# Patient Record
Sex: FEMALE | Race: BLACK OR AFRICAN AMERICAN | NOT HISPANIC OR LATINO | Employment: OTHER | URBAN - METROPOLITAN AREA
[De-identification: names, ages, dates, MRNs, and addresses within clinical notes are randomized per-mention and may not be internally consistent; named-entity substitution may affect disease eponyms.]

---

## 2017-01-26 ENCOUNTER — OFFICE VISIT (OUTPATIENT)
Dept: FAMILY MEDICINE | Facility: CLINIC | Age: 67
End: 2017-01-26
Payer: MEDICARE

## 2017-01-26 VITALS
HEART RATE: 70 BPM | TEMPERATURE: 97 F | WEIGHT: 165.56 LBS | BODY MASS INDEX: 27.58 KG/M2 | HEIGHT: 65 IN | DIASTOLIC BLOOD PRESSURE: 82 MMHG | RESPIRATION RATE: 18 BRPM | SYSTOLIC BLOOD PRESSURE: 136 MMHG

## 2017-01-26 DIAGNOSIS — M54.32 SCIATICA OF LEFT SIDE: Primary | ICD-10-CM

## 2017-01-26 PROCEDURE — 99999 PR PBB SHADOW E&M-EST. PATIENT-LVL III: CPT | Mod: PBBFAC,,, | Performed by: FAMILY MEDICINE

## 2017-01-26 PROCEDURE — 99213 OFFICE O/P EST LOW 20 MIN: CPT | Mod: PBBFAC,PO | Performed by: FAMILY MEDICINE

## 2017-01-26 PROCEDURE — 99213 OFFICE O/P EST LOW 20 MIN: CPT | Mod: S$PBB,,, | Performed by: FAMILY MEDICINE

## 2017-01-26 RX ORDER — HYDROCODONE BITARTRATE AND ACETAMINOPHEN 5; 325 MG/1; MG/1
1-2 TABLET ORAL EVERY 6 HOURS PRN
Qty: 30 TABLET | Refills: 0 | Status: SHIPPED | OUTPATIENT
Start: 2017-01-26 | End: 2017-02-05

## 2017-01-26 RX ORDER — CYCLOBENZAPRINE HCL 5 MG
5 TABLET ORAL 3 TIMES DAILY PRN
Qty: 30 TABLET | Refills: 1 | Status: SHIPPED | OUTPATIENT
Start: 2017-01-26 | End: 2017-02-05

## 2017-01-26 NOTE — PROGRESS NOTES
CHIEF COMPLAINT: This is a 66-year-old female complaining of lower back and leg pain.    SUBJECTIVE: The patient complains of a one-week history of left lower back pain which radiates around to anterior thigh and extends to ankle.  She denies any history of injury or increased activity.  Pain is described as a dull achiness which is intermittent in nature.  She rates the pain at 8 out of 10 on the pain scale.  Patient has been using heating pad with some easing of the pain.  She also tried taking Gas-X without relief.  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  She has autoimmune hepatitis and takes small dose of prednisone daily.  She denies numbness, tingling or weakness in limb.    ROS:  GENERAL: Patient denies fever, chills, night sweats.  Patient denies weight gain or loss. Patient denies anorexia, fatigue, weakness or swollen glands.  SKIN: Patient denies rash.  LUNGS: Patient denies cough, wheeze or hemoptysis.  CARDIOVASCULAR: Patient denies chest pain, shortness of breath, palpitations, syncope or lower extremity edema.  GI: Patient denies abdominal pain, nausea, vomiting, diarrhea, constipation, blood in stool or melena.  GENITOURINARY: Patient denies pelvic pain, vaginal discharge, itch or odor. Patient denies irregular vaginal bleeding.  Patient denies dysuria, frequency, hematuria, nocturia, urgency or incontinence.  MUSCULOSKELETAL: Patient denies joint swelling, redness or warmth.  NEUROLOGIC: Patient denies headache, vertigo, numbness, tingling, weakness in limb, or abnormality of gait.    OBJECTIVE:   GENERAL: Well-developed well-nourished, black female alert and oriented x3 in no acute distress.  Memory, judgment and cognition without deficit.  Accompanied by her daughter.  SKIN: Clear without rash.  Normal color and tone.  HEENT: Eyes: Clear conjunctivae.  No scleral icterus.    NECK: Supple, normal range of motion.   LUNGS: Normal respiratory effort.  BACK: No CVA or  spinal tenderness.  Slight tenderness in left upper buttock area.  EXTREMITIES: Without cyanosis, clubbing or edema.  Distal pulses 2+ and equal.  Normal range of motion in lower extremities.  No joint effusion, erythema or warmth.  NEUROLOGIC:    Motor strength equal bilaterally.  Sensation normal to touch.  Deep tendon reflexes 2+ and equal.  Gait without abnormality.  No tremor.  Negative SLR.  Negative Sowmya.    ASSESSMENT:  Left sided sciatica    PLAN:   1.  Apply moist heat and/or ice 4 times a day for 15-20 minutes.  2.  Cyclobenzaprine 5 mg 3 times a day as needed for muscle spasm.  3.  Norco 5-3 and 25 mg 1-2 tablets every 6 hours as needed for pain.  4.  Consider physical therapy if no improvement.

## 2017-01-26 NOTE — MR AVS SNAPSHOT
[facility]  [address]  [place] [address]  Phone: [phone]                  Julianna Montemayor   2017 10:45 AM   Office Visit    Description:  Female : 1950   Provider:  Juhi Wilburn MD   Department:  [facility]           Reason for Visit     Hip Pain           Diagnoses this Visit        Comments    Sciatica of left side    -  Primary            To Do List           Goals (5 Years of Data)     None       These Medications        Disp Refills Start End    cyclobenzaprine (FLEXERIL) 5 MG tablet 30 tablet 1 2017    Take 1 tablet (5 mg total) by mouth 3 (three) times daily as needed for Muscle spasms. - Oral    Pharmacy: [facility]/pharmacy #[id number]  [place], [place] - [address] AT AT [facility] Ph #: [phone]       hydrocodone-acetaminophen 5-325mg (NORCO) 5-325 mg per tablet 30 tablet 0 2017    Take 1-2 tablets by mouth every 6 (six) hours as needed for Pain. - Oral    Pharmacy: [facility]/pharmacy #[id number] - [place] - [address] AT AT [facility] Ph #: [phone]         [facility] On Call     [facility] On Call Nurse Care Line -  Assistance  Registered nurses in the [facility] provide clinical advisement, health education, appointment booking, and other advisory services.  Call for this free service at [phone].             Medications           Message regarding Medications     Verify the changes and/or additions to your medication regime listed below are the same as discussed with your clinician today.  If any of these changes or additions are incorrect, please notify your healthcare provider.        START taking these NEW medications        Refills    cyclobenzaprine (FLEXERIL) 5 MG tablet 1    Sig: Take 1 tablet (5 mg total) by mouth 3 (three) times daily as needed for Muscle spasms.    Class: Normal    Route: Oral    hydrocodone-acetaminophen 5-325mg  "(NORCO) 5-325 mg per tablet 0    Sig: Take 1-2 tablets by mouth every 6 (six) hours as needed for Pain.    Class: Print    Route: Oral           Verify that the below list of medications is an accurate representation of the medications you are currently taking.  If none reported, the list may be blank. If incorrect, please contact your healthcare provider. Carry this list with you in case of emergency.           Current Medications     azathioprine (IMURAN) 50 mg Tab TAKE 1 TABLET (50 MG TOTAL) BY MOUTH ONCE DAILY.    calcium-vitamin D (OSCAL) 250 (625)-125 mg-unit per tablet Take 1 tablet by mouth once daily.    cholecalciferol, vitamin D3, (VITAMIN D3) 5,000 unit Tab Take 1,000 Units by mouth once daily.     fluticasone (FLONASE) 50 mcg/actuation nasal spray 2 sprays by Each Nare route once daily.    ketoconazole (NIZORAL) 2 % cream Apply to bottom of foot twice daily for two weeks    lisinopril-hydrochlorothiazide (PRINZIDE,ZESTORETIC) 10-12.5 mg per tablet Take 1 tablet by mouth once daily.    predniSONE (DELTASONE) 5 MG tablet TAKE 1/2 TABLET BY MOUTH DAILY    cyclobenzaprine (FLEXERIL) 5 MG tablet Take 1 tablet (5 mg total) by mouth 3 (three) times daily as needed for Muscle spasms.    hydrocodone-acetaminophen 5-325mg (NORCO) 5-325 mg per tablet Take 1-2 tablets by mouth every 6 (six) hours as needed for Pain.    triamcinolone acetonide 0.1% (KENALOG) 0.1 % cream Apply topically 3 (three) times daily.           Clinical Reference Information           Vital Signs - Last Recorded  Most recent update: 1/26/2017 10:47 AM by Veena Dunaway LPN    BP Pulse Temp Resp Ht Wt    136/82 70 97.3 °F (36.3 °C) (Tympanic) 18 5' 5" (1.651 m) 75.1 kg (165 lb 9.1 oz)    BMI                27.55 kg/m2          Blood Pressure          Most Recent Value    BP  136/82      Allergies as of 1/26/2017     No Known Drug Allergies      Immunizations Administered on Date of Encounter - 1/26/2017     None      "

## 2017-02-01 RX ORDER — LISINOPRIL AND HYDROCHLOROTHIAZIDE 10; 12.5 MG/1; MG/1
TABLET ORAL
Qty: 30 TABLET | Refills: 0 | Status: SHIPPED | OUTPATIENT
Start: 2017-02-01 | End: 2017-03-11 | Stop reason: SDUPTHER

## 2017-02-10 ENCOUNTER — HOSPITAL ENCOUNTER (OUTPATIENT)
Dept: RADIOLOGY | Facility: HOSPITAL | Age: 67
Discharge: HOME | End: 2017-02-10
Attending: FAMILY MEDICINE
Payer: MEDICARE

## 2017-02-10 ENCOUNTER — OFFICE VISIT (OUTPATIENT)
Dept: FAMILY MEDICINE | Facility: CLINIC | Age: 67
End: 2017-02-10
Payer: MEDICARE

## 2017-02-10 VITALS
SYSTOLIC BLOOD PRESSURE: 120 MMHG | HEART RATE: 61 BPM | BODY MASS INDEX: 26.75 KG/M2 | DIASTOLIC BLOOD PRESSURE: 90 MMHG | WEIGHT: 166.44 LBS | RESPIRATION RATE: 16 BRPM | TEMPERATURE: 97 F | OXYGEN SATURATION: 98 % | HEIGHT: 66 IN

## 2017-02-10 DIAGNOSIS — M54.32 SCIATICA OF LEFT SIDE: ICD-10-CM

## 2017-02-10 DIAGNOSIS — M54.32 SCIATICA OF LEFT SIDE: Primary | ICD-10-CM

## 2017-02-10 PROCEDURE — 73502 X-RAY EXAM HIP UNI 2-3 VIEWS: CPT | Mod: 26,LT,, | Performed by: RADIOLOGY

## 2017-02-10 PROCEDURE — 72100 X-RAY EXAM L-S SPINE 2/3 VWS: CPT | Mod: TC,PO

## 2017-02-10 PROCEDURE — 73502 X-RAY EXAM HIP UNI 2-3 VIEWS: CPT | Mod: TC,PO,LT

## 2017-02-10 PROCEDURE — 72100 X-RAY EXAM L-S SPINE 2/3 VWS: CPT | Mod: 26,,, | Performed by: RADIOLOGY

## 2017-02-10 PROCEDURE — 99214 OFFICE O/P EST MOD 30 MIN: CPT | Mod: S$PBB,,, | Performed by: FAMILY MEDICINE

## 2017-02-10 PROCEDURE — 99999 PR PBB SHADOW E&M-EST. PATIENT-LVL III: CPT | Mod: PBBFAC,,, | Performed by: FAMILY MEDICINE

## 2017-02-10 RX ORDER — TRAMADOL HYDROCHLORIDE 50 MG/1
50 TABLET ORAL EVERY 12 HOURS PRN
Qty: 30 TABLET | Refills: 0 | Status: SHIPPED | OUTPATIENT
Start: 2017-02-10 | End: 2017-02-20

## 2017-02-10 NOTE — PROGRESS NOTES
Subjective:       Patient ID: Julianna Montemayor is a 66 y.o. female.    Chief Complaint: Hip Pain      HPI   Ms. Montemayor presents to clinic today for complaints of hip pain. The pain has been going on for 3 weeks.   The pain is worst with walking. She describes it as aching pain. The pain radiates down the left leg.   The pain gets better with rest. She was seen at urgent care and was given Norco for this.   She has been taking it and it helps, but it makes her feel drowsy.   She also saw her PCP for this in January and was given muscle relaxer. She has not tried PT yet.   She would like xray to make sure nothing else is wrong.     Review of Systems   Constitutional: Negative for fever.   Respiratory: Negative for cough and shortness of breath.    Cardiovascular: Negative for chest pain.   Skin: Negative for rash.       Medication List with Changes/Refills   Current Medications    AZATHIOPRINE (IMURAN) 50 MG TAB    TAKE 1 TABLET (50 MG TOTAL) BY MOUTH ONCE DAILY.    CALCIUM-VITAMIN D (OSCAL) 250 (625)-125 MG-UNIT PER TABLET    Take 1 tablet by mouth once daily.    CHOLECALCIFEROL, VITAMIN D3, (VITAMIN D3) 5,000 UNIT TAB    Take 1,000 Units by mouth once daily.     FLUTICASONE (FLONASE) 50 MCG/ACTUATION NASAL SPRAY    2 sprays by Each Nare route once daily.    KETOCONAZOLE (NIZORAL) 2 % CREAM    Apply to bottom of foot twice daily for two weeks    LISINOPRIL-HYDROCHLOROTHIAZIDE (PRINZIDE,ZESTORETIC) 10-12.5 MG PER TABLET    TAKE 1 TABLET BY MOUTH EVERY DAY FOR FLUID/BLOOD PRESSURE    PREDNISONE (DELTASONE) 5 MG TABLET    TAKE 1/2 TABLET BY MOUTH DAILY    TRIAMCINOLONE ACETONIDE 0.1% (KENALOG) 0.1 % CREAM    Apply topically 3 (three) times daily.       Patient Active Problem List   Diagnosis    Autoimmune hepatitis    Anemia    Essential hypertension    Hyperlipidemia    Osteopenia    Allergic rhinitis    Vitamin D deficiency disease    Osteoarthritis of right knee    Anxiety associated with depression     Chest pain syndrome    Palpitation    Hx of colonic polyps         Objective:     Physical Exam   Constitutional: She is oriented to person, place, and time. She appears well-developed and well-nourished. No distress.   HENT:   Head: Normocephalic and atraumatic.   Right Ear: External ear normal.   Left Ear: External ear normal.   Eyes: EOM are normal. Right eye exhibits no discharge. Left eye exhibits no discharge.   Cardiovascular: Normal rate and regular rhythm.    Pulmonary/Chest: Effort normal and breath sounds normal. No respiratory distress. She has no wheezes.   Musculoskeletal: She exhibits no edema.   No cva tenderness  No spinal or paraspinal tenderness  Normal gait   Forward flexion is wnl      Neurological: She is alert and oriented to person, place, and time.   Skin: Skin is warm and dry. She is not diaphoretic. No erythema.   Psychiatric: She has a normal mood and affect.   Vitals reviewed.    Vitals:    02/10/17 1034   BP: (!) 120/90   Pulse: 61   Resp: 16   Temp: 96.8 °F (36 °C)       Assessment/  PLAN     Sciatica of left side  -     tramadol (ULTRAM) 50 mg tablet; Take 1 tablet (50 mg total) by mouth every 12 (twelve) hours as needed for Pain.  Dispense: 30 tablet; Refill: 0  -     X-Ray Hip 2 View Left; Future; Expected date: 2/10/17  -     X-Ray Lumbar Spine Ap And Lateral; Future; Expected date: 2/10/17  - continue rubs such as bio freeze or bengay or asper cream, heating pad to area  - offered PT , will think about it         Ivonne Shaw MD  [facility]

## 2017-02-10 NOTE — MR AVS SNAPSHOT
[facility]  [address] [address]  Phone: [phone]                  Julianna Montemayor   2/10/2017 10:20 AM   Office Visit    Description:  Female : 1950   Provider:  Ivonne Shaw MD   Department:  [facility]           Reason for Visit     Hip Pain           Diagnoses this Visit        Comments    Sciatica of left side    -  Primary            To Do List           Future Appointments        Provider Department Dept Phone    2/10/2017 11:30 AM [facility] [phone]      Goals (5 Years of Data)     None      Follow-Up and Disposition     Return if symptoms worsen or fail to improve.       These Medications        Disp Refills Start End    tramadol (ULTRAM) 50 mg tablet 30 tablet 0 2/10/2017 2017    Take 1 tablet (50 mg total) by mouth every 12 (twelve) hours as needed for Pain. - Oral    Pharmacy: [facility]/pharmacy #[id number] - [place], [place] - [address] AT AT [facility] [address] Ph #: [phone]         [facility] On Call     [facility] On [facility] Nurse Care Line -  Assistance  Registered nurses in the [facility] provide clinical advisement, health education, appointment booking, and other advisory services.  Call for this free service at [phone].             Medications           Message regarding Medications     Verify the changes and/or additions to your medication regime listed below are the same as discussed with your clinician today.  If any of these changes or additions are incorrect, please notify your healthcare provider.        START taking these NEW medications        Refills    tramadol (ULTRAM) 50 mg tablet 0    Sig: Take 1 tablet (50 mg total) by mouth every 12 (twelve) hours as needed for Pain.    Class: Print    Route: Oral           Verify that the below list of medications is an accurate representation of the medications you are currently taking.  If none  "reported, the list may be blank. If incorrect, please contact your healthcare provider. Carry this list with you in case of emergency.           Current Medications     azathioprine (IMURAN) 50 mg Tab TAKE 1 TABLET (50 MG TOTAL) BY MOUTH ONCE DAILY.    calcium-vitamin D (OSCAL) 250 (625)-125 mg-unit per tablet Take 1 tablet by mouth once daily.    cholecalciferol, vitamin D3, (VITAMIN D3) 5,000 unit Tab Take 1,000 Units by mouth once daily.     fluticasone (FLONASE) 50 mcg/actuation nasal spray 2 sprays by Each Nare route once daily.    ketoconazole (NIZORAL) 2 % cream Apply to bottom of foot twice daily for two weeks    lisinopril-hydrochlorothiazide (PRINZIDE,ZESTORETIC) 10-12.5 mg per tablet TAKE 1 TABLET BY MOUTH EVERY DAY FOR FLUID/BLOOD PRESSURE    predniSONE (DELTASONE) 5 MG tablet TAKE 1/2 TABLET BY MOUTH DAILY    tramadol (ULTRAM) 50 mg tablet Take 1 tablet (50 mg total) by mouth every 12 (twelve) hours as needed for Pain.    triamcinolone acetonide 0.1% (KENALOG) 0.1 % cream Apply topically 3 (three) times daily.           Clinical Reference Information           Your Vitals Were     BP Pulse Temp Resp Height Weight    120/90 61 96.8 °F (36 °C) 16 5' 6" (1.676 m) 75.5 kg (166 lb 7.2 oz)    SpO2 BMI             98% 26.87 kg/m2         Blood Pressure          Most Recent Value    BP  (!)  120/90      Allergies as of 2/10/2017     No Known Drug Allergies      Immunizations Administered on Date of Encounter - 2/10/2017     None      Orders Placed During Today's Visit     Future Labs/Procedures Expected by Expires    X-Ray Hip 2 View Left  2/10/2017 2/10/2018    X-Ray Lumbar Spine Ap And Lateral  2/10/2017 2/10/2018      Language Assistance Services     ATTENTION: Language assistance services are available, free of charge. Please call [phone].      ATENCIÓN: Si habla niranjan, tiene a fernández disposición servicios gratuitos de asistencia lingüística. Llame al [phone].     CHÚ Ý: N?u b?n nói Ti?ng Vi?t, " có các d?ch v? h? tr? ngôn ng? mi?n phí dành cho b?n. G?i s? [phone].         [facility] complies with applicable Federal civil rights laws and does not discriminate on the basis of race, color, national origin, age, disability, or sex.

## 2017-03-11 RX ORDER — LISINOPRIL AND HYDROCHLOROTHIAZIDE 10; 12.5 MG/1; MG/1
TABLET ORAL
Qty: 30 TABLET | Refills: 0 | Status: SHIPPED | OUTPATIENT
Start: 2017-03-11 | End: 2017-04-17 | Stop reason: SDUPTHER

## 2017-03-15 RX ORDER — PREDNISONE 5 MG/1
TABLET ORAL
Qty: 30 TABLET | Refills: 3 | Status: SHIPPED | OUTPATIENT
Start: 2017-03-15 | End: 2017-12-09 | Stop reason: SDUPTHER

## 2017-04-10 DIAGNOSIS — K75.4 AUTOIMMUNE HEPATITIS TREATED WITH STEROIDS: Primary | ICD-10-CM

## 2017-04-11 ENCOUNTER — TELEPHONE (OUTPATIENT)
Dept: GASTROENTEROLOGY | Facility: CLINIC | Age: 67
End: 2017-04-11

## 2017-04-11 RX ORDER — LISINOPRIL AND HYDROCHLOROTHIAZIDE 10; 12.5 MG/1; MG/1
TABLET ORAL
Qty: 30 TABLET | Refills: 0 | OUTPATIENT
Start: 2017-04-11

## 2017-04-11 NOTE — TELEPHONE ENCOUNTER
----- Message from Bran Reeves III, MD sent at 4/10/2017  1:20 PM CDT -----  Contact: pt  Orders placed.   ----- Message -----     From: Kenan Gooden MA     Sent: 4/10/2017   9:19 AM       To: Bran Reeves III, MD    I s patient suppose to have lab work.Their is no lab work in her chart but patient believe she was suppose to have lab work before her appt.  ----- Message -----     From: Silva Portillo     Sent: 4/10/2017   8:45 AM       To: [facility] Staff    Pt scheduled her f/u appt for 4/20 and thinks she may need to do labs.  Needs orders.  Please call pt at [phone].

## 2017-04-17 ENCOUNTER — LAB VISIT (OUTPATIENT)
Dept: LAB | Facility: HOSPITAL | Age: 67
End: 2017-04-17
Attending: INTERNAL MEDICINE
Payer: MEDICARE

## 2017-04-17 DIAGNOSIS — K75.4 AUTOIMMUNE HEPATITIS TREATED WITH STEROIDS: ICD-10-CM

## 2017-04-17 LAB
ALBUMIN SERPL BCP-MCNC: 3.7 G/DL
ALP SERPL-CCNC: 46 U/L
ALT SERPL W/O P-5'-P-CCNC: 12 U/L
ANION GAP SERPL CALC-SCNC: 9 MMOL/L
AST SERPL-CCNC: 20 U/L
BASOPHILS # BLD AUTO: 0.02 K/UL
BASOPHILS NFR BLD: 0.4 %
BILIRUB DIRECT SERPL-MCNC: 0.2 MG/DL
BILIRUB SERPL-MCNC: 0.5 MG/DL
BUN SERPL-MCNC: 20 MG/DL
CALCIUM SERPL-MCNC: 9.3 MG/DL
CHLORIDE SERPL-SCNC: 107 MMOL/L
CO2 SERPL-SCNC: 24 MMOL/L
CREAT SERPL-MCNC: 1.1 MG/DL
CRP SERPL-MCNC: 0.5 MG/L
DIFFERENTIAL METHOD: ABNORMAL
EOSINOPHIL # BLD AUTO: 0.1 K/UL
EOSINOPHIL NFR BLD: 2.6 %
ERYTHROCYTE [DISTWIDTH] IN BLOOD BY AUTOMATED COUNT: 14.2 %
ERYTHROCYTE [SEDIMENTATION RATE] IN BLOOD BY WESTERGREN METHOD: 38 MM/HR
EST. GFR  (AFRICAN AMERICAN): 59.6 ML/MIN/1.73 M^2
EST. GFR  (NON AFRICAN AMERICAN): 51.7 ML/MIN/1.73 M^2
GLUCOSE SERPL-MCNC: 75 MG/DL
HCT VFR BLD AUTO: 33.5 %
HGB BLD-MCNC: 10.7 G/DL
LYMPHOCYTES # BLD AUTO: 1.7 K/UL
LYMPHOCYTES NFR BLD: 36.1 %
MCH RBC QN AUTO: 29 PG
MCHC RBC AUTO-ENTMCNC: 31.9 %
MCV RBC AUTO: 91 FL
MONOCYTES # BLD AUTO: 0.5 K/UL
MONOCYTES NFR BLD: 10.7 %
NEUTROPHILS # BLD AUTO: 2.3 K/UL
NEUTROPHILS NFR BLD: 50 %
PLATELET # BLD AUTO: 232 K/UL
PMV BLD AUTO: 9.7 FL
POTASSIUM SERPL-SCNC: 4.2 MMOL/L
PROT SERPL-MCNC: 7.9 G/DL
RBC # BLD AUTO: 3.69 M/UL
SODIUM SERPL-SCNC: 140 MMOL/L
WBC # BLD AUTO: 4.6 K/UL

## 2017-04-17 PROCEDURE — 85651 RBC SED RATE NONAUTOMATED: CPT

## 2017-04-17 PROCEDURE — 86140 C-REACTIVE PROTEIN: CPT

## 2017-04-17 PROCEDURE — 80048 BASIC METABOLIC PNL TOTAL CA: CPT

## 2017-04-17 PROCEDURE — 36415 COLL VENOUS BLD VENIPUNCTURE: CPT | Mod: PO

## 2017-04-17 PROCEDURE — 85025 COMPLETE CBC W/AUTO DIFF WBC: CPT

## 2017-04-17 PROCEDURE — 80076 HEPATIC FUNCTION PANEL: CPT

## 2017-04-17 RX ORDER — LISINOPRIL AND HYDROCHLOROTHIAZIDE 10; 12.5 MG/1; MG/1
1 TABLET ORAL DAILY
Qty: 30 TABLET | Refills: 1 | Status: SHIPPED | OUTPATIENT
Start: 2017-04-17 | End: 2017-06-15 | Stop reason: SDUPTHER

## 2017-04-17 NOTE — TELEPHONE ENCOUNTER
----- Message from Radha Celeste sent at 4/17/2017 10:49 AM CDT -----  Contact: Patient  Patient wants to know why her blood pressure medication was denied for a refill, please call her back at [phone]. Thank you

## 2017-04-19 ENCOUNTER — PATIENT MESSAGE (OUTPATIENT)
Dept: GASTROENTEROLOGY | Facility: CLINIC | Age: 67
End: 2017-04-19

## 2017-04-20 ENCOUNTER — OFFICE VISIT (OUTPATIENT)
Dept: GASTROENTEROLOGY | Facility: CLINIC | Age: 67
End: 2017-04-20
Payer: MEDICARE

## 2017-04-20 VITALS
HEART RATE: 62 BPM | SYSTOLIC BLOOD PRESSURE: 122 MMHG | DIASTOLIC BLOOD PRESSURE: 88 MMHG | HEIGHT: 66 IN | BODY MASS INDEX: 26.82 KG/M2 | WEIGHT: 166.88 LBS

## 2017-04-20 DIAGNOSIS — Z86.010 HISTORY OF COLON POLYPS: ICD-10-CM

## 2017-04-20 DIAGNOSIS — M85.80 OSTEOPENIA, UNSPECIFIED LOCATION: ICD-10-CM

## 2017-04-20 DIAGNOSIS — K75.4 AUTOIMMUNE HEPATITIS: Primary | ICD-10-CM

## 2017-04-20 DIAGNOSIS — I10 ESSENTIAL HYPERTENSION: ICD-10-CM

## 2017-04-20 DIAGNOSIS — M16.0 PRIMARY OSTEOARTHRITIS OF BOTH HIPS: ICD-10-CM

## 2017-04-20 PROCEDURE — 99213 OFFICE O/P EST LOW 20 MIN: CPT | Mod: S$PBB,,, | Performed by: INTERNAL MEDICINE

## 2017-04-20 PROCEDURE — 99212 OFFICE O/P EST SF 10 MIN: CPT | Mod: PBBFAC | Performed by: INTERNAL MEDICINE

## 2017-04-20 PROCEDURE — 99999 PR PBB SHADOW E&M-EST. PATIENT-LVL II: CPT | Mod: PBBFAC,,, | Performed by: INTERNAL MEDICINE

## 2017-04-20 RX ORDER — HYDROCODONE BITARTRATE AND ACETAMINOPHEN 10; 325 MG/1; MG/1
TABLET ORAL
Refills: 0 | COMMUNITY
Start: 2017-02-07 | End: 2017-06-01

## 2017-04-20 NOTE — PROGRESS NOTES
Subjective:       Patient ID: Julianna Montemayor is a 66 y.o. female.    Chief Complaint: Follow-up    HPI Comments: The patient is known to our service from multiple previous encounters.  She has been followed by our service for autoimmune hepatitis for several years.  For the last few years she has been well controlled with low-dose steroids and Imuran.  Her labs taken prior to this visit show her liver function studies to remain well within the normal range.  We have attempted on several occasions to wean her totally off of steroids, but this has not been successful; however, she is controlled with only 2.5 mg of prednisone daily, and 50 mg of Imuran daily.  We have again discussed the high importance of continuing therapy with vitamin D and calcium.  She has had some difficulties with calcium, and because of this I have suggested she have a conversation with her PCP regarding consideration of bisphosphonates.  The patient's ESR continues mildly elevated and her CRP remains well within normal range.    The patient also has a history of colon polyps and her last colonoscopy was done in August 2016.  There was removal of a single polyp and diverticulosis was also documented.  She will need a repeat study done in August 2021.     Review of Systems   Constitutional: Negative for activity change, appetite change, chills, diaphoresis, fatigue, fever and unexpected weight change.   HENT: Negative for congestion, ear discharge, ear pain, hearing loss, nosebleeds, postnasal drip and tinnitus.    Eyes: Negative for photophobia and visual disturbance.   Respiratory: Negative for apnea, cough, choking, chest tightness, shortness of breath and wheezing.    Cardiovascular: Negative for chest pain, palpitations and leg swelling.   Gastrointestinal: Negative for abdominal distention, abdominal pain, anal bleeding, blood in stool, constipation, diarrhea, nausea, rectal pain and vomiting.   Genitourinary: Negative for difficulty  urinating, dyspareunia, dysuria, flank pain, frequency, hematuria, menstrual problem, pelvic pain, urgency, vaginal bleeding and vaginal discharge.   Musculoskeletal: Positive for back pain. Negative for arthralgias, gait problem, joint swelling, myalgias and neck stiffness.   Skin: Negative for pallor and rash.   Neurological: Negative for dizziness, tremors, seizures, syncope, speech difficulty, weakness, numbness and headaches.   Hematological: Negative for adenopathy.   Psychiatric/Behavioral: Negative for agitation, confusion, hallucinations, sleep disturbance and suicidal ideas.       Objective:      Physical Exam   Constitutional: She is oriented to person, place, and time. She appears well-developed and well-nourished.   HENT:   Head: Normocephalic and atraumatic.   Bilateral turbinate congestion   Eyes: Conjunctivae and EOM are normal. Pupils are equal, round, and reactive to light. Right eye exhibits no discharge. Left eye exhibits no discharge. No scleral icterus.   Neck: Normal range of motion. Neck supple. No JVD present. No thyromegaly present.   Cardiovascular: Normal rate, regular rhythm, normal heart sounds and intact distal pulses.  Exam reveals no gallop and no friction rub.    No murmur heard.  Pulmonary/Chest: Effort normal and breath sounds normal. No respiratory distress. She has no wheezes. She has no rales. She exhibits no tenderness.   Abdominal: Soft. Bowel sounds are normal. She exhibits no distension and no mass. There is no tenderness. There is no rebound and no guarding.   Musculoskeletal: Normal range of motion. She exhibits no edema.   Lymphadenopathy:     She has no cervical adenopathy.   Neurological: She is alert and oriented to person, place, and time. She has normal reflexes. She exhibits normal muscle tone. Coordination normal.   Skin: Skin is warm and dry. No rash noted. No erythema. No pallor.   Psychiatric: She has a normal mood and affect. Her behavior is normal. Judgment  and thought content normal.   Vitals reviewed.      Assessment:     Autoimmune hepatitis             Stable    Osteopenia    History of colon polyps    Hypertension    DJD  No diagnosis found.    Plan:       As outlined above

## 2017-06-01 ENCOUNTER — OFFICE VISIT (OUTPATIENT)
Dept: FAMILY MEDICINE | Facility: CLINIC | Age: 67
End: 2017-06-01
Payer: MEDICARE

## 2017-06-01 VITALS
WEIGHT: 168.19 LBS | SYSTOLIC BLOOD PRESSURE: 120 MMHG | BODY MASS INDEX: 28.02 KG/M2 | DIASTOLIC BLOOD PRESSURE: 78 MMHG | RESPIRATION RATE: 18 BRPM | HEART RATE: 72 BPM | HEIGHT: 65 IN | OXYGEN SATURATION: 98 % | TEMPERATURE: 98 F

## 2017-06-01 DIAGNOSIS — F41.8 ANXIETY ASSOCIATED WITH DEPRESSION: ICD-10-CM

## 2017-06-01 DIAGNOSIS — K75.4 AUTOIMMUNE HEPATITIS: Chronic | ICD-10-CM

## 2017-06-01 DIAGNOSIS — M85.80 OSTEOPENIA, UNSPECIFIED LOCATION: ICD-10-CM

## 2017-06-01 DIAGNOSIS — I10 ESSENTIAL HYPERTENSION: Primary | ICD-10-CM

## 2017-06-01 DIAGNOSIS — E78.5 HYPERLIPIDEMIA, UNSPECIFIED HYPERLIPIDEMIA TYPE: ICD-10-CM

## 2017-06-01 PROCEDURE — 99999 PR PBB SHADOW E&M-EST. PATIENT-LVL III: CPT | Mod: PBBFAC,,, | Performed by: FAMILY MEDICINE

## 2017-06-01 PROCEDURE — 99214 OFFICE O/P EST MOD 30 MIN: CPT | Mod: S$PBB,,, | Performed by: FAMILY MEDICINE

## 2017-06-01 PROCEDURE — 99213 OFFICE O/P EST LOW 20 MIN: CPT | Mod: PBBFAC,PO | Performed by: FAMILY MEDICINE

## 2017-06-01 NOTE — PROGRESS NOTES
CHIEF COMPLAINT: This is a 66-year-old female here for preventive health exam.    SUBJECTIVE: Patient is doing well without complaints except for postnasal drip and nasal congestion.  She has dry skin, left forefoot.  She complains of recent pain in left low back/buttock which has decided.  She continues to have sadness related to loss of family members.    The patient's blood pressure is controlled on antihypertensive medication.  She is taking oral steroids for autoimmune hepatitis.  Recent bone DEXA scan showed osteopenia with declining bone mineral density.  It was suggested that she start bisphosphonates, but she refuses.       Eye exam July 2015. Mammogram December 2015 (refuses mammogram). Bone DEXA scan, October 2014, due again in October 2017. Colonoscopy August 2016, due again in August 2021. Tdap April 2012. Pneumovax April 2012. Flu vaccine, November 2016.  Prevnar vaccine December 2015.     ROS:  GENERAL: Patient denies fever, chills, night sweats. Patient denies weight loss. Patient denies anorexia, fatigue, weakness or swollen glands.  SKIN: Patient denies rash or hair loss.  HEENT: Patient denies sore throat, ear pain, hearing loss, nasal congestion, or runny nose. Patient denies visual disturbance, eye irritation or discharge.  LUNGS: Patient denies cough, wheeze or hemoptysis.  CARDIOVASCULAR: Patient denies chest pain, shortness of breath, palpitations, syncope or lower extremity edema.  GI: Patient denies abdominal pain, nausea, vomiting, diarrhea, constipation, blood in stool or melena.  GENITOURINARY: Patient denies pelvic pain, vaginal discharge, itch or odor. Patient denies irregular vaginal bleeding. Patient denies dysuria, frequency, hematuria, nocturia, urgency or incontinence.  BREASTS: Patient denies breast pain, mass or nipple discharge.  MUSCULOSKELETAL: Patient denies joint swelling, redness or warmth. Positive for joint pain.  NEUROLOGIC: Patient denies headache, vertigo,  paresthesias, weakness in limb, dysarthria, dysphagia or abnormality of gait.  PSYCHIATRIC: Patient denies anxiety, depression, or memory loss.     OBJECTIVE:   GENERAL: Well-developed well-nourished slightly overweight black female alert and oriented x3, in no acute distress. Memory, judgment and cognition without deficit.  Weight gain of 5 pounds in the last year.  SKIN: Clear without rash. Normal color and tone. Lipoma left upper extremity. Ganglion cyst, left dorsal wrist. Small, movable nodule right dorsal foot.  Dry flaky skin, left foot.  HEENT: Eyes: Clear conjunctivae. Pupils equal reactive to light and accommodation. Ears: Clear TMs. Clear canals. Nose: Moderate nasal congestion, left greater than right. Pharynx: Without injection or exudates.  NECK: Supple, normal range of motion. No masses, nodes or enlarged thyroid. No JVD. Carotids 2+ and equal. No bruits.  LUNGS: Clear to auscultation. Normal respiratory effort.  CARDIOVASCULAR: Regular rhythm, normal S1, S2 without murmur, gallop or rub.  BACK: No CVA or spinal tenderness.  BREASTS: No masses, tenderness or nipple discharge.  ABDOMEN: Normal appearance. Active bowel sounds. Soft, nontender without mass or organomegaly. No rebound or guarding.  EXTREMITIES: Without cyanosis, clubbing or edema. Distal pulses 2+ and equal. Normal range of motion in all extremities. No joint effusion, erythema or warmth.  NEUROLOGIC: Cranial nerves II through XII slightly overweight without deficit. Motor strength equal bilaterally. Sensation normal to touch. Deep tendon reflexes 2+ and equal. Gait without abnormality. No tremor. Negative cerebellar signs.  PELVIC: External: Without lesions or inflammation. Vaginal: Clear. Atrophic changes. Slight anterior wall prolapse. Cuff intact. Bimanual: Non-tender, without masses. Rectovaginal: Confirms, heme-negative stool x2.    ASSESSMENT:  1. Essential hypertension    2. Anxiety associated with depression    3. Autoimmune  hepatitis    4. Hyperlipidemia, unspecified hyperlipidemia type    5. Osteopenia, unspecified location      PLAN:   1.  Weight reduction.  Exercise regularly.  2.  Age-appropriate counseling.  3.  Fasting lab.  4.  Patient refuses mammogram.  5.  Try Vicks VapoRub to dry skin on left foot.

## 2017-06-08 ENCOUNTER — LAB VISIT (OUTPATIENT)
Dept: LAB | Facility: HOSPITAL | Age: 67
End: 2017-06-08
Attending: FAMILY MEDICINE
Payer: MEDICARE

## 2017-06-08 DIAGNOSIS — I10 ESSENTIAL HYPERTENSION: ICD-10-CM

## 2017-06-08 LAB
BASOPHILS # BLD AUTO: 0.02 K/UL
BASOPHILS NFR BLD: 0.4 %
CHOLEST/HDLC SERPL: 3 {RATIO}
DIFFERENTIAL METHOD: ABNORMAL
EOSINOPHIL # BLD AUTO: 0.2 K/UL
EOSINOPHIL NFR BLD: 3.6 %
ERYTHROCYTE [DISTWIDTH] IN BLOOD BY AUTOMATED COUNT: 13.8 %
HCT VFR BLD AUTO: 37.2 %
HDL/CHOLESTEROL RATIO: 33 %
HDLC SERPL-MCNC: 261 MG/DL
HDLC SERPL-MCNC: 86 MG/DL
HGB BLD-MCNC: 11.5 G/DL
LDLC SERPL CALC-MCNC: 158.8 MG/DL
LYMPHOCYTES # BLD AUTO: 1.9 K/UL
LYMPHOCYTES NFR BLD: 40 %
MCH RBC QN AUTO: 28.7 PG
MCHC RBC AUTO-ENTMCNC: 30.9 %
MCV RBC AUTO: 93 FL
MONOCYTES # BLD AUTO: 0.5 K/UL
MONOCYTES NFR BLD: 10.1 %
NEUTROPHILS # BLD AUTO: 2.2 K/UL
NEUTROPHILS NFR BLD: 45.7 %
NONHDLC SERPL-MCNC: 175 MG/DL
PLATELET # BLD AUTO: 239 K/UL
PMV BLD AUTO: 10.1 FL
RBC # BLD AUTO: 4.01 M/UL
TRIGL SERPL-MCNC: 81 MG/DL
TSH SERPL DL<=0.005 MIU/L-ACNC: 0.79 UIU/ML
WBC # BLD AUTO: 4.73 K/UL

## 2017-06-08 PROCEDURE — 84443 ASSAY THYROID STIM HORMONE: CPT

## 2017-06-08 PROCEDURE — 36415 COLL VENOUS BLD VENIPUNCTURE: CPT | Mod: PO

## 2017-06-08 PROCEDURE — 80061 LIPID PANEL: CPT

## 2017-06-08 PROCEDURE — 85025 COMPLETE CBC W/AUTO DIFF WBC: CPT

## 2017-06-15 RX ORDER — LISINOPRIL AND HYDROCHLOROTHIAZIDE 10; 12.5 MG/1; MG/1
1 TABLET ORAL DAILY
Qty: 30 TABLET | Refills: 11 | Status: SHIPPED | OUTPATIENT
Start: 2017-06-15 | End: 2018-07-11 | Stop reason: SDUPTHER

## 2017-07-11 RX ORDER — AZATHIOPRINE 50 MG/1
TABLET ORAL
Qty: 30 TABLET | Refills: 5 | Status: SHIPPED | OUTPATIENT
Start: 2017-07-11 | End: 2017-08-29 | Stop reason: SDUPTHER

## 2017-08-29 RX ORDER — AZATHIOPRINE 50 MG/1
50 TABLET ORAL DAILY
Qty: 90 TABLET | Refills: 5 | Status: SHIPPED | OUTPATIENT
Start: 2017-08-29 | End: 2019-07-22 | Stop reason: SDUPTHER

## 2017-11-20 ENCOUNTER — OFFICE VISIT (OUTPATIENT)
Dept: FAMILY MEDICINE | Facility: CLINIC | Age: 67
End: 2017-11-20
Payer: MEDICARE

## 2017-11-20 VITALS
BODY MASS INDEX: 27.4 KG/M2 | OXYGEN SATURATION: 99 % | SYSTOLIC BLOOD PRESSURE: 120 MMHG | TEMPERATURE: 98 F | WEIGHT: 164.44 LBS | HEART RATE: 82 BPM | RESPIRATION RATE: 18 BRPM | DIASTOLIC BLOOD PRESSURE: 88 MMHG | HEIGHT: 65 IN

## 2017-11-20 DIAGNOSIS — J01.90 ACUTE RHINOSINUSITIS: Primary | ICD-10-CM

## 2017-11-20 PROCEDURE — 99213 OFFICE O/P EST LOW 20 MIN: CPT | Mod: 25,S$PBB,, | Performed by: FAMILY MEDICINE

## 2017-11-20 PROCEDURE — 99999 PR PBB SHADOW E&M-EST. PATIENT-LVL III: CPT | Mod: PBBFAC,,, | Performed by: FAMILY MEDICINE

## 2017-11-20 PROCEDURE — 96372 THER/PROPH/DIAG INJ SC/IM: CPT | Mod: PBBFAC,PO

## 2017-11-20 PROCEDURE — 99213 OFFICE O/P EST LOW 20 MIN: CPT | Mod: PBBFAC,PO | Performed by: FAMILY MEDICINE

## 2017-11-20 RX ORDER — BETAMETHASONE SODIUM PHOSPHATE AND BETAMETHASONE ACETATE 3; 3 MG/ML; MG/ML
12 INJECTION, SUSPENSION INTRA-ARTICULAR; INTRALESIONAL; INTRAMUSCULAR; SOFT TISSUE
Status: COMPLETED | OUTPATIENT
Start: 2017-11-20 | End: 2017-11-20

## 2017-11-20 RX ADMIN — BETAMETHASONE ACETATE AND BETAMETHASONE SODIUM PHOSPHATE 12 MG: 3; 3 INJECTION, SUSPENSION INTRA-ARTICULAR; INTRALESIONAL; INTRAMUSCULAR; SOFT TISSUE at 10:11

## 2017-11-20 NOTE — PROGRESS NOTES
CHIEF COMPLAINT: This is a 67-year-old female complaining of respiratory illness.    SUBJECTIVE: Patient complains of a 5 day history of itchy ears, head congestion, runny nose, postnasal drip, cough and chest congestion.  She produces occasional yellow mucus.  She denies shortness of breath, wheezing, fever, chills, or sore throat.  She's been taking Robitussin-DM and using Flonase inhaler.  No ill contacts.  Patient did not receive influenza vaccine.    ROS:  GENERAL: Patient denies fever, chills, night sweats. Patient denies weight loss. Patient denies anorexia, fatigue, weakness or swollen glands.  SKIN: Patient denies hair loss.  HEENT: Patient denies sore throat, ear pain, hearing loss, visual disturbance, eye irritation or discharge.  LUNGS: Patient denies wheeze or hemoptysis.  CARDIOVASCULAR: Patient denies chest pain, shortness of breath, palpitations, syncope or lower extremity edema.  GI: Patient denies abdominal pain, nausea, vomiting, diarrhea, constipation, blood in stool or melena.     OBJECTIVE:   GENERAL: Well-developed well-nourished slightly overweight black female alert and oriented x3, in no acute distress. Memory, judgment and cognition without deficit.  Anxious affect.  SKIN: Clear without rash.   HEENT: Eyes: Clear conjunctivae.  No scleral icterus. Ears: Clear TMs. Clear canals. Nose: Moderate bilateral nasal congestion, left greater than right. Pharynx: Without injection or exudates.  NECK: Supple, normal range of motion. No lymphadenopathy.  LUNGS: Clear to auscultation. Normal respiratory effort.  CARDIOVASCULAR: Regular rhythm, normal S1, S2 without murmur, gallop or rub.    ASSESSMENT:  Acute rhinosinusitis    PLAN:   1.  Celestone 12 mg IM.  2.  Claritin 10 mg daily.  3.  Flonase inhaler 2 sprays nostril once daily.  4.  Symptomatic treatment.  Continue to use Robitussin-DM as needed.  5.  Follow-up if no improvement or worsening symptoms.

## 2017-12-09 RX ORDER — PREDNISONE 5 MG/1
TABLET ORAL
Qty: 30 TABLET | Refills: 3 | Status: SHIPPED | OUTPATIENT
Start: 2017-12-09 | End: 2018-10-24 | Stop reason: SDUPTHER

## 2017-12-11 ENCOUNTER — IMMUNIZATION (OUTPATIENT)
Dept: FAMILY MEDICINE | Facility: CLINIC | Age: 67
End: 2017-12-11
Payer: MEDICARE

## 2017-12-11 PROCEDURE — G0008 ADMIN INFLUENZA VIRUS VAC: HCPCS | Mod: PBBFAC,PO

## 2018-02-15 ENCOUNTER — OFFICE VISIT (OUTPATIENT)
Dept: FAMILY MEDICINE | Facility: CLINIC | Age: 68
End: 2018-02-15
Payer: MEDICARE

## 2018-02-15 ENCOUNTER — LAB VISIT (OUTPATIENT)
Dept: LAB | Facility: HOSPITAL | Age: 68
End: 2018-02-15
Attending: FAMILY MEDICINE
Payer: MEDICARE

## 2018-02-15 VITALS
BODY MASS INDEX: 26.96 KG/M2 | TEMPERATURE: 98 F | OXYGEN SATURATION: 98 % | HEIGHT: 65 IN | WEIGHT: 161.81 LBS | SYSTOLIC BLOOD PRESSURE: 100 MMHG | RESPIRATION RATE: 18 BRPM | DIASTOLIC BLOOD PRESSURE: 76 MMHG | HEART RATE: 81 BPM

## 2018-02-15 DIAGNOSIS — D64.9 ANEMIA, UNSPECIFIED TYPE: ICD-10-CM

## 2018-02-15 DIAGNOSIS — R20.2 PARESTHESIAS: Primary | ICD-10-CM

## 2018-02-15 DIAGNOSIS — K75.4 AUTOIMMUNE HEPATITIS: Chronic | ICD-10-CM

## 2018-02-15 LAB
BASOPHILS # BLD AUTO: 0.05 K/UL
BASOPHILS NFR BLD: 0.9 %
DIFFERENTIAL METHOD: ABNORMAL
EOSINOPHIL # BLD AUTO: 0.1 K/UL
EOSINOPHIL NFR BLD: 2.5 %
ERYTHROCYTE [DISTWIDTH] IN BLOOD BY AUTOMATED COUNT: 13.7 %
HCT VFR BLD AUTO: 35.9 %
HGB BLD-MCNC: 11 G/DL
IMM GRANULOCYTES # BLD AUTO: 0.03 K/UL
IMM GRANULOCYTES NFR BLD AUTO: 0.5 %
LYMPHOCYTES # BLD AUTO: 2.1 K/UL
LYMPHOCYTES NFR BLD: 36.4 %
MCH RBC QN AUTO: 28.6 PG
MCHC RBC AUTO-ENTMCNC: 30.6 G/DL
MCV RBC AUTO: 93 FL
MONOCYTES # BLD AUTO: 0.6 K/UL
MONOCYTES NFR BLD: 11.2 %
NEUTROPHILS # BLD AUTO: 2.8 K/UL
NEUTROPHILS NFR BLD: 48.5 %
NRBC BLD-RTO: 0 /100 WBC
PLATELET # BLD AUTO: 225 K/UL
PMV BLD AUTO: 10.2 FL
RBC # BLD AUTO: 3.85 M/UL
WBC # BLD AUTO: 5.69 K/UL

## 2018-02-15 PROCEDURE — 36415 COLL VENOUS BLD VENIPUNCTURE: CPT | Mod: PO

## 2018-02-15 PROCEDURE — 99213 OFFICE O/P EST LOW 20 MIN: CPT | Mod: PBBFAC,PO | Performed by: FAMILY MEDICINE

## 2018-02-15 PROCEDURE — 1126F AMNT PAIN NOTED NONE PRSNT: CPT | Mod: ,,, | Performed by: FAMILY MEDICINE

## 2018-02-15 PROCEDURE — 99999 PR PBB SHADOW E&M-EST. PATIENT-LVL III: CPT | Mod: PBBFAC,,, | Performed by: FAMILY MEDICINE

## 2018-02-15 PROCEDURE — 85025 COMPLETE CBC W/AUTO DIFF WBC: CPT

## 2018-02-15 PROCEDURE — 1159F MED LIST DOCD IN RCRD: CPT | Mod: ,,, | Performed by: FAMILY MEDICINE

## 2018-02-15 PROCEDURE — 99213 OFFICE O/P EST LOW 20 MIN: CPT | Mod: S$PBB,,, | Performed by: FAMILY MEDICINE

## 2018-02-15 NOTE — PROGRESS NOTES
CHIEF COMPLAINT: This is a 67-year-old female complaining of numbness and tingling in hands.    SUBJECTIVE: Patient complains of the onset of tingling and numbness in fingers of both hands 2 weeks ago.  She denies any history of injury or increased activity.  Patient plays a lot of games on her phone at night.  She feels like symptoms are resolving.  She denies joint swelling, redness or warmth.  Patient is concerned that her blood count may have dropped.  She denies blood in stool, hematemesis, hematuria, or hemoptysis.  The patient has a history of autoimmune hepatitis which is stable and followed by gastroenterology.    ROS:  GENERAL: Patient denies fever, chills, night sweats. Patient denies weight loss or gain. Patient denies anorexia, fatigue, weakness or swollen glands.  SKIN: Patient denies rash or hair loss.  LUNGS: Patient denies cough, wheeze or hemoptysis.  CARDIOVASCULAR: Patient denies chest pain, shortness of breath, palpitations, syncope or lower extremity edema.  GI: Patient denies abdominal pain, nausea, vomiting, diarrhea, constipation, blood in stool or melena.  MUSCULOSKELETAL: Patient denies joint swelling, redness or warmth. Positive for joint pain.  NEUROLOGIC: Patient denies headache, vertigo,, weakness in limb, dysarthria, dysphagia or abnormality of gait.  PSYCHIATRIC: Patient denies anxiety, depression, or memory loss.     OBJECTIVE:   GENERAL: Well-developed well-nourished slightly overweight black female alert and oriented x3, in no acute distress. Memory, judgment and cognition without deficit.   SKIN: Clear without rash. Normal color and tone.   HEENT: Eyes: Clear conjunctivae.  No scleral icterus.  NECK: Supple, normal range of motion. No lymphadenopathy.  LUNGS: Clear to auscultation. Normal respiratory effort.  CARDIOVASCULAR: Regular rhythm, normal S1, S2 without murmur, gallop or rub.  EXTREMITIES: Without cyanosis, clubbing or edema. Distal pulses 2+ and equal. Normal range of  motion in upper extremities. No joint effusion, erythema or warmth.  Negative Tinel's.  Negative Phalen's.  NEUROLOGIC: Cranial nerves II through XII slightly overweight without deficit. Motor strength equal bilaterally. Sensation normal to touch. Deep tendon reflexes 2+ and equal. Gait without abnormality. No tremor. Negative cerebellar signs.    ASSESSMENT:  1. Paresthesias    2. Anemia, unspecified type    3. Autoimmune hepatitis      PLAN:   1.  Bilateral wrist splints if necessary.  2.  Check CBC.  3.  Follow-up if no improvement or worsening symptoms.

## 2018-03-01 ENCOUNTER — OFFICE VISIT (OUTPATIENT)
Dept: GASTROENTEROLOGY | Facility: CLINIC | Age: 68
End: 2018-03-01
Payer: MEDICARE

## 2018-03-01 ENCOUNTER — LAB VISIT (OUTPATIENT)
Dept: LAB | Facility: HOSPITAL | Age: 68
End: 2018-03-01
Attending: INTERNAL MEDICINE
Payer: MEDICARE

## 2018-03-01 VITALS
SYSTOLIC BLOOD PRESSURE: 122 MMHG | HEIGHT: 65 IN | HEART RATE: 60 BPM | WEIGHT: 160.94 LBS | BODY MASS INDEX: 26.81 KG/M2 | DIASTOLIC BLOOD PRESSURE: 78 MMHG

## 2018-03-01 DIAGNOSIS — Z86.010 HX OF COLONIC POLYPS: ICD-10-CM

## 2018-03-01 DIAGNOSIS — M85.80 OSTEOPENIA, UNSPECIFIED LOCATION: ICD-10-CM

## 2018-03-01 DIAGNOSIS — K57.30 DIVERTICULOSIS OF LARGE INTESTINE WITHOUT HEMORRHAGE: ICD-10-CM

## 2018-03-01 DIAGNOSIS — K75.4 AUTOIMMUNE HEPATITIS: Primary | ICD-10-CM

## 2018-03-01 DIAGNOSIS — K75.4 AUTOIMMUNE HEPATITIS: ICD-10-CM

## 2018-03-01 LAB
ALBUMIN SERPL BCP-MCNC: 3.5 G/DL
ALP SERPL-CCNC: 48 U/L
ALT SERPL W/O P-5'-P-CCNC: 12 U/L
AST SERPL-CCNC: 23 U/L
BILIRUB DIRECT SERPL-MCNC: 0.3 MG/DL
BILIRUB SERPL-MCNC: 0.8 MG/DL
PROT SERPL-MCNC: 7.9 G/DL

## 2018-03-01 PROCEDURE — 99212 OFFICE O/P EST SF 10 MIN: CPT | Mod: PBBFAC | Performed by: INTERNAL MEDICINE

## 2018-03-01 PROCEDURE — 80076 HEPATIC FUNCTION PANEL: CPT

## 2018-03-01 PROCEDURE — 99999 PR PBB SHADOW E&M-EST. PATIENT-LVL II: CPT | Mod: PBBFAC,,, | Performed by: INTERNAL MEDICINE

## 2018-03-01 PROCEDURE — 36415 COLL VENOUS BLD VENIPUNCTURE: CPT

## 2018-03-01 PROCEDURE — 99213 OFFICE O/P EST LOW 20 MIN: CPT | Mod: S$PBB,,, | Performed by: INTERNAL MEDICINE

## 2018-03-01 NOTE — PROGRESS NOTES
Subjective:       Patient ID: Julianna Montemayor is a 67 y.o. female.    Chief Complaint: Follow-up    The patient is followed for Autoimmune hepatitis. She is here for follow-up visit, but has not had blood work done. Her last evaluation was done in April of last year and her LFTs were well WNL. We will have her repeat labs today. She has continued anemia likely of chronic disease. Her Hgb has been stable at ~ 11 g over the past year. Her las colonoscopy in 2016 revealed a single colon polyp that was benign. She was concerned she needed another study, but this will not be needed until 2022.       Review of Systems   Constitutional: Negative for activity change, appetite change, chills, diaphoresis, fatigue, fever and unexpected weight change.   HENT: Negative for congestion, ear discharge, ear pain, hearing loss, nosebleeds, postnasal drip and tinnitus.    Eyes: Positive for visual disturbance. Negative for photophobia.   Respiratory: Negative for apnea, cough, choking, chest tightness, shortness of breath and wheezing.    Cardiovascular: Negative for chest pain, palpitations and leg swelling.   Gastrointestinal: Negative for abdominal distention, abdominal pain, anal bleeding, blood in stool, constipation, diarrhea, nausea, rectal pain and vomiting.   Genitourinary: Negative for difficulty urinating, dyspareunia, dysuria, flank pain, frequency, hematuria, menstrual problem, pelvic pain, urgency, vaginal bleeding and vaginal discharge.   Musculoskeletal: Negative for arthralgias, back pain, gait problem, joint swelling, myalgias and neck stiffness.        Joint stiffness   Skin: Negative for pallor and rash.   Neurological: Negative for dizziness, tremors, seizures, syncope, speech difficulty, weakness, numbness and headaches.   Hematological: Negative for adenopathy.   Psychiatric/Behavioral: Negative for agitation, confusion, hallucinations, sleep disturbance and suicidal ideas.       Objective:      Physical Exam    Constitutional: She is oriented to person, place, and time. She appears well-developed and well-nourished.   HENT:   Head: Normocephalic and atraumatic.   Bilateral turbinate congestion   Eyes: Conjunctivae and EOM are normal. Pupils are equal, round, and reactive to light. Right eye exhibits no discharge. Left eye exhibits no discharge. No scleral icterus.   Neck: Normal range of motion. Neck supple. No JVD present. No thyromegaly present.   Cardiovascular: Normal rate, regular rhythm, normal heart sounds and intact distal pulses.  Exam reveals no gallop and no friction rub.    No murmur heard.  Pulmonary/Chest: Effort normal and breath sounds normal. No respiratory distress. She has no wheezes. She has no rales. She exhibits no tenderness.   Abdominal: Soft. Bowel sounds are normal. She exhibits no distension and no mass. There is no tenderness. There is no rebound and no guarding.   Musculoskeletal: Normal range of motion. She exhibits no edema.   Lymphadenopathy:     She has no cervical adenopathy.   Neurological: She is alert and oriented to person, place, and time. She has normal reflexes. She exhibits normal muscle tone. Coordination normal.   Skin: Skin is warm and dry. No rash noted. No erythema. No pallor.   Psychiatric: She has a normal mood and affect. Her behavior is normal. Judgment and thought content normal.   Vitals reviewed.      Assessment:    Autoimmune hepatitis   Hx of Colon Polyps    Diverticulosis   Osteopenia         On calcium supplement         Has refused bisphos.  No diagnosis found.    Plan:     Check LFTs

## 2018-06-15 DIAGNOSIS — Z12.39 BREAST CANCER SCREENING: ICD-10-CM

## 2018-06-17 RX ORDER — LISINOPRIL AND HYDROCHLOROTHIAZIDE 10; 12.5 MG/1; MG/1
1 TABLET ORAL DAILY
Qty: 30 TABLET | Refills: 11 | OUTPATIENT
Start: 2018-06-17

## 2018-06-20 NOTE — TELEPHONE ENCOUNTER
----- Message from Kenan Gooden MA sent at 6/20/2018  2:20 PM CDT -----      ----- Message -----  From: Bran Reeves III, MD  Sent: 6/17/2018   8:23 PM  To: Kenan Gooden MA    Have a request to refill patient's BP med. Must be a mistake. Should go to [facility]. I am refusing. Let her know. GH

## 2018-07-11 RX ORDER — LISINOPRIL AND HYDROCHLOROTHIAZIDE 10; 12.5 MG/1; MG/1
1 TABLET ORAL DAILY
Qty: 30 TABLET | Refills: 0 | Status: SHIPPED | OUTPATIENT
Start: 2018-07-11 | End: 2018-08-08 | Stop reason: SDUPTHER

## 2018-07-31 ENCOUNTER — OFFICE VISIT (OUTPATIENT)
Dept: FAMILY MEDICINE | Facility: CLINIC | Age: 68
End: 2018-07-31
Payer: MEDICARE

## 2018-07-31 ENCOUNTER — LAB VISIT (OUTPATIENT)
Dept: LAB | Facility: HOSPITAL | Age: 68
End: 2018-07-31
Attending: FAMILY MEDICINE
Payer: MEDICARE

## 2018-07-31 VITALS
SYSTOLIC BLOOD PRESSURE: 100 MMHG | TEMPERATURE: 98 F | DIASTOLIC BLOOD PRESSURE: 68 MMHG | OXYGEN SATURATION: 97 % | WEIGHT: 153 LBS | HEART RATE: 71 BPM | HEIGHT: 65 IN | BODY MASS INDEX: 25.49 KG/M2 | RESPIRATION RATE: 18 BRPM

## 2018-07-31 DIAGNOSIS — F41.8 ANXIETY ASSOCIATED WITH DEPRESSION: ICD-10-CM

## 2018-07-31 DIAGNOSIS — E78.5 HYPERLIPIDEMIA, UNSPECIFIED HYPERLIPIDEMIA TYPE: ICD-10-CM

## 2018-07-31 DIAGNOSIS — Z23 NEED FOR PNEUMOCOCCAL VACCINATION: ICD-10-CM

## 2018-07-31 DIAGNOSIS — Z01.419 WELL FEMALE EXAM WITH ROUTINE GYNECOLOGICAL EXAM: ICD-10-CM

## 2018-07-31 DIAGNOSIS — I10 ESSENTIAL HYPERTENSION: ICD-10-CM

## 2018-07-31 DIAGNOSIS — I10 ESSENTIAL HYPERTENSION: Primary | ICD-10-CM

## 2018-07-31 DIAGNOSIS — K75.4 AUTOIMMUNE HEPATITIS: Chronic | ICD-10-CM

## 2018-07-31 DIAGNOSIS — Z78.0 POSTMENOPAUSAL: ICD-10-CM

## 2018-07-31 LAB
ALBUMIN SERPL BCP-MCNC: 3.6 G/DL
ALP SERPL-CCNC: 42 U/L
ALT SERPL W/O P-5'-P-CCNC: 12 U/L
ANION GAP SERPL CALC-SCNC: 7 MMOL/L
AST SERPL-CCNC: 18 U/L
BASOPHILS # BLD AUTO: 0.03 K/UL
BASOPHILS NFR BLD: 0.7 %
BILIRUB SERPL-MCNC: 0.7 MG/DL
BUN SERPL-MCNC: 23 MG/DL
CALCIUM SERPL-MCNC: 9.5 MG/DL
CHLORIDE SERPL-SCNC: 104 MMOL/L
CHOLEST SERPL-MCNC: 240 MG/DL
CHOLEST/HDLC SERPL: 3.2 {RATIO}
CO2 SERPL-SCNC: 27 MMOL/L
CREAT SERPL-MCNC: 1 MG/DL
DIFFERENTIAL METHOD: ABNORMAL
EOSINOPHIL # BLD AUTO: 0.1 K/UL
EOSINOPHIL NFR BLD: 2.3 %
ERYTHROCYTE [DISTWIDTH] IN BLOOD BY AUTOMATED COUNT: 13.7 %
EST. GFR  (AFRICAN AMERICAN): >60 ML/MIN/1.73 M^2
EST. GFR  (NON AFRICAN AMERICAN): 57.6 ML/MIN/1.73 M^2
GLUCOSE SERPL-MCNC: 76 MG/DL
HCT VFR BLD AUTO: 35.4 %
HDLC SERPL-MCNC: 75 MG/DL
HDLC SERPL: 31.3 %
HGB BLD-MCNC: 10.6 G/DL
IMM GRANULOCYTES # BLD AUTO: 0.01 K/UL
IMM GRANULOCYTES NFR BLD AUTO: 0.2 %
LDLC SERPL CALC-MCNC: 151.8 MG/DL
LYMPHOCYTES # BLD AUTO: 1.5 K/UL
LYMPHOCYTES NFR BLD: 34.4 %
MCH RBC QN AUTO: 28.6 PG
MCHC RBC AUTO-ENTMCNC: 29.9 G/DL
MCV RBC AUTO: 96 FL
MONOCYTES # BLD AUTO: 0.5 K/UL
MONOCYTES NFR BLD: 11.6 %
NEUTROPHILS # BLD AUTO: 2.2 K/UL
NEUTROPHILS NFR BLD: 50.8 %
NONHDLC SERPL-MCNC: 165 MG/DL
NRBC BLD-RTO: 0 /100 WBC
PLATELET # BLD AUTO: 208 K/UL
PMV BLD AUTO: 10.4 FL
POTASSIUM SERPL-SCNC: 3.8 MMOL/L
PROT SERPL-MCNC: 7.4 G/DL
RBC # BLD AUTO: 3.7 M/UL
SODIUM SERPL-SCNC: 138 MMOL/L
TRIGL SERPL-MCNC: 66 MG/DL
TSH SERPL DL<=0.005 MIU/L-ACNC: 0.72 UIU/ML
WBC # BLD AUTO: 4.3 K/UL

## 2018-07-31 PROCEDURE — 80053 COMPREHEN METABOLIC PANEL: CPT

## 2018-07-31 PROCEDURE — 99214 OFFICE O/P EST MOD 30 MIN: CPT | Mod: 25,S$PBB,, | Performed by: FAMILY MEDICINE

## 2018-07-31 PROCEDURE — G0101 CA SCREEN;PELVIC/BREAST EXAM: HCPCS | Mod: PBBFAC,PO | Performed by: FAMILY MEDICINE

## 2018-07-31 PROCEDURE — 84443 ASSAY THYROID STIM HORMONE: CPT

## 2018-07-31 PROCEDURE — G0101 CA SCREEN;PELVIC/BREAST EXAM: HCPCS | Mod: S$PBB,,, | Performed by: FAMILY MEDICINE

## 2018-07-31 PROCEDURE — 99214 OFFICE O/P EST MOD 30 MIN: CPT | Mod: PBBFAC,PO,25 | Performed by: FAMILY MEDICINE

## 2018-07-31 PROCEDURE — 85025 COMPLETE CBC W/AUTO DIFF WBC: CPT

## 2018-07-31 PROCEDURE — 80061 LIPID PANEL: CPT

## 2018-07-31 PROCEDURE — 99999 PR PBB SHADOW E&M-EST. PATIENT-LVL IV: CPT | Mod: PBBFAC,,, | Performed by: FAMILY MEDICINE

## 2018-07-31 PROCEDURE — 36415 COLL VENOUS BLD VENIPUNCTURE: CPT | Mod: PO

## 2018-07-31 PROCEDURE — 90732 PPSV23 VACC 2 YRS+ SUBQ/IM: CPT | Mod: PBBFAC,PO

## 2018-07-31 NOTE — PROGRESS NOTES
CHIEF COMPLAINT:  This is a 67-year-old female here for preventive health exam.    SUBJECTIVE:  Patient is doing well without complaints except for situational stress.  The patient's blood pressure is controlled on lisinopril HCT 10-12.5 mg daily.  She is taking oral steroids for autoimmune hepatitis.  Patient has osteopenia with declining bone mineral density.  It was suggested that she start bisphosphonates, but she refuses.       Eye exam November 2016. Mammogram December 2015 (refuses mammogram). Bone DEXA scan, October 2014, due again in October 2017. Colonoscopy August 2016, due again in August 2021. Tdap April 2012. Pneumovax April 2012. Flu vaccine, November 2016.  Prevnar vaccine December 2015.     ROS:  GENERAL: Patient denies fever, chills, night sweats. Patient denies weight gain . Patient denies anorexia, fatigue, weakness or swollen glands.  SKIN: Patient denies rash or hair loss.  HEENT: Patient denies sore throat, ear pain, hearing loss, nasal congestion, or runny nose. Patient denies visual disturbance, eye irritation or discharge.  LUNGS: Patient denies cough, wheeze or hemoptysis.  CARDIOVASCULAR: Patient denies chest pain, shortness of breath, palpitations, syncope or lower extremity edema.  GI: Patient denies abdominal pain, nausea, vomiting, diarrhea, constipation, blood in stool or melena.  GENITOURINARY: Patient denies pelvic pain, vaginal discharge, itch or odor. Patient denies irregular vaginal bleeding. Patient denies dysuria, frequency, hematuria, nocturia, urgency or incontinence.  BREASTS: Patient denies breast pain, mass or nipple discharge.  MUSCULOSKELETAL: Patient denies joint swelling, redness or warmth. Positive for joint pain.  NEUROLOGIC: Patient denies headache, vertigo, paresthesias, weakness in limb, dysarthria, dysphagia or abnormality of gait.  PSYCHIATRIC: Patient denies anxiety, depression, or memory loss.     OBJECTIVE:   GENERAL: Well-developed well-nourished slightly  overweight black female alert and oriented x3, in no acute distress. Memory, judgment and cognition without deficit.  Weight loss of 13 pounds in the last year.  SKIN: Clear without rash. Normal color and tone. Lipoma left upper extremity. Ganglion cyst, left dorsal wrist. Small, movable nodule right dorsal foot.  Dry flaky skin, left foot.  HEENT: Eyes: Clear conjunctivae. Pupils equal reactive to light and accommodation. Ears: Clear TMs. Clear canals. Nose: Moderate nasal congestion, left greater than right. Pharynx: Without injection or exudates.  NECK: Supple, normal range of motion. No masses, nodes or enlarged thyroid. No JVD. Carotids 2+ and equal. No bruits.  LUNGS: Clear to auscultation. Normal respiratory effort.  CARDIOVASCULAR: Regular rhythm, normal S1, S2 without murmur, gallop or rub.  BACK: No CVA or spinal tenderness.  BREASTS: No masses, tenderness or nipple discharge.  ABDOMEN: Normal appearance. Active bowel sounds. Soft, nontender without mass or organomegaly. No rebound or guarding.  EXTREMITIES: Without cyanosis, clubbing or edema. Distal pulses 2+ and equal. Normal range of motion in all extremities. No joint effusion, erythema or warmth.  NEUROLOGIC: Cranial nerves II through XII without deficit. Motor strength equal bilaterally. Sensation normal to touch. Deep tendon reflexes 2+ and equal. Gait without abnormality. No tremor. Negative cerebellar signs.  PELVIC: External: Without lesions or inflammation. Vaginal: Clear. Atrophic changes. Slight anterior wall prolapse. Cuff intact. Bimanual: Non-tender, without masses. Rectovaginal: Confirms, heme-negative stool x2.     ASSESSMENT:  1. Preventative health care    2. Autoimmune hepatitis    3. Anxiety associated with depression    4. Essential hypertension    5. Hyperlipidemia, unspecified hyperlipidemia type    6. Need for pneumococcal vaccination    7. Postmenopausal      PLAN:   1.  Weight reduction.  Exercise regularly.  2.   Age-appropriate counseling.  3.  Fasting lab.  4.  Patient declines mammogram.  5.  Bone DEXA scan.  6.  Pneumovax.  7.  Follow-up annually.

## 2018-08-03 ENCOUNTER — APPOINTMENT (OUTPATIENT)
Dept: RADIOLOGY | Facility: CLINIC | Age: 68
End: 2018-08-03
Attending: FAMILY MEDICINE
Payer: MEDICARE

## 2018-08-03 DIAGNOSIS — Z78.0 POSTMENOPAUSAL: ICD-10-CM

## 2018-08-03 PROCEDURE — 77080 DXA BONE DENSITY AXIAL: CPT | Mod: TC,PO

## 2018-08-03 PROCEDURE — 77080 DXA BONE DENSITY AXIAL: CPT | Mod: 26,,, | Performed by: RADIOLOGY

## 2018-08-06 ENCOUNTER — OFFICE VISIT (OUTPATIENT)
Dept: OPHTHALMOLOGY | Facility: CLINIC | Age: 68
End: 2018-08-06
Payer: MEDICARE

## 2018-08-06 DIAGNOSIS — H52.4 HYPEROPIA WITH PRESBYOPIA, BILATERAL: ICD-10-CM

## 2018-08-06 DIAGNOSIS — H52.03 HYPEROPIA WITH PRESBYOPIA, BILATERAL: ICD-10-CM

## 2018-08-06 DIAGNOSIS — H25.13 NUCLEAR SCLEROSIS, BILATERAL: Primary | ICD-10-CM

## 2018-08-06 PROCEDURE — 92014 COMPRE OPH EXAM EST PT 1/>: CPT | Mod: S$PBB,,, | Performed by: OPTOMETRIST

## 2018-08-06 PROCEDURE — 99211 OFF/OP EST MAY X REQ PHY/QHP: CPT | Mod: PBBFAC,PO | Performed by: OPTOMETRIST

## 2018-08-06 PROCEDURE — 99999 PR PBB SHADOW E&M-EST. PATIENT-LVL I: CPT | Mod: PBBFAC,,, | Performed by: OPTOMETRIST

## 2018-08-06 PROCEDURE — 92015 DETERMINE REFRACTIVE STATE: CPT | Mod: ,,, | Performed by: OPTOMETRIST

## 2018-08-06 NOTE — PROGRESS NOTES
HPI     PTs last exam was 11/3/16 with DNL. PT c/o overall blurred va and wears   gls full time.   HPI    Any vision changes since last exam: no  Eye pain: no  Other ocular symptoms: occasional dryness    Do you wear currently wear glasses or contacts? gls    Interested in contacts today? no    Do you plan on getting new glasses today? If needed      Last edited by Mildred Jang MA on 8/6/2018  8:05 AM. (History)            Assessment /Plan     For exam results, see Encounter Report.    Nuclear sclerosis, bilateral  Cat stable OU with good va  Surgery is not indicated at this time. Monitor 12 months.    Hyperopia with presbyopia, bilateral  Eyeglass Final Rx     Eyeglass Final Rx       Sphere Cylinder Axis Add    Right +1.75   +2.75    Left +1.75 +0.75 065 +2.75    Expiration Date:  8/7/2019                  Recommended systane ultra PRN for dry eye    RTC 1 yr for dilated eye exam or PRN if any problems.   Discussed above and answered questions.

## 2018-08-08 RX ORDER — LISINOPRIL AND HYDROCHLOROTHIAZIDE 10; 12.5 MG/1; MG/1
1 TABLET ORAL DAILY
Qty: 30 TABLET | Refills: 11 | Status: SHIPPED | OUTPATIENT
Start: 2018-08-08 | End: 2018-12-18 | Stop reason: SDUPTHER

## 2018-08-16 ENCOUNTER — OFFICE VISIT (OUTPATIENT)
Dept: FAMILY MEDICINE | Facility: CLINIC | Age: 68
End: 2018-08-16
Payer: MEDICARE

## 2018-08-16 ENCOUNTER — HOSPITAL ENCOUNTER (OUTPATIENT)
Dept: RADIOLOGY | Facility: HOSPITAL | Age: 68
Discharge: HOME | End: 2018-08-16
Attending: FAMILY MEDICINE
Payer: MEDICARE

## 2018-08-16 VITALS
HEIGHT: 65 IN | BODY MASS INDEX: 25.64 KG/M2 | SYSTOLIC BLOOD PRESSURE: 126 MMHG | WEIGHT: 153.88 LBS | DIASTOLIC BLOOD PRESSURE: 70 MMHG | HEART RATE: 72 BPM | TEMPERATURE: 98 F | OXYGEN SATURATION: 97 % | RESPIRATION RATE: 18 BRPM

## 2018-08-16 DIAGNOSIS — I10 ESSENTIAL HYPERTENSION: Chronic | ICD-10-CM

## 2018-08-16 DIAGNOSIS — M67.471 GANGLION CYST OF RIGHT FOOT: Primary | ICD-10-CM

## 2018-08-16 DIAGNOSIS — D64.9 ANEMIA, UNSPECIFIED TYPE: ICD-10-CM

## 2018-08-16 DIAGNOSIS — E78.5 HYPERLIPIDEMIA, UNSPECIFIED HYPERLIPIDEMIA TYPE: ICD-10-CM

## 2018-08-16 DIAGNOSIS — K75.4 AUTOIMMUNE HEPATITIS: Chronic | ICD-10-CM

## 2018-08-16 PROCEDURE — 71046 X-RAY EXAM CHEST 2 VIEWS: CPT | Mod: TC,FY,PO

## 2018-08-16 PROCEDURE — 99999 PR PBB SHADOW E&M-EST. PATIENT-LVL III: CPT | Mod: PBBFAC,,, | Performed by: FAMILY MEDICINE

## 2018-08-16 PROCEDURE — 93005 ELECTROCARDIOGRAM TRACING: CPT | Mod: PBBFAC,PO | Performed by: FAMILY MEDICINE

## 2018-08-16 PROCEDURE — 93010 ELECTROCARDIOGRAM REPORT: CPT | Mod: ,,, | Performed by: INTERNAL MEDICINE

## 2018-08-16 PROCEDURE — 99214 OFFICE O/P EST MOD 30 MIN: CPT | Mod: S$PBB,,, | Performed by: FAMILY MEDICINE

## 2018-08-16 PROCEDURE — 71046 X-RAY EXAM CHEST 2 VIEWS: CPT | Mod: 26,,, | Performed by: RADIOLOGY

## 2018-08-16 PROCEDURE — 99213 OFFICE O/P EST LOW 20 MIN: CPT | Mod: PBBFAC,25,PO | Performed by: FAMILY MEDICINE

## 2018-08-16 NOTE — PROGRESS NOTES
CHIEF COMPLAINT:  This is a 67-year-old female here for preoperative clearance for right foot surgery per Dr. KAMRYN Caban the due to chronic medical conditions.    SUBJECTIVE:  Patient has painful ganglion cyst on right foot and is to undergo excision on September 5, 2018.  She has had no previous problems with general anesthesia.  She denies family history of perioperative complications.  The patient has hypertension which is controlled on lisinopril HCT 10-12.5 mg daily.  She is taking oral steroids for autoimmune hepatitis.  Patient has osteopenia with declining bone mineral density.  It was suggested that she start bisphosphonates, but she refuses.        ROS:  GENERAL: Patient denies fever, chills, night sweats. Patient denies weight gain . Patient denies anorexia, fatigue, weakness or swollen glands.  SKIN: Patient denies rash or hair loss.  HEENT: Patient denies sore throat, ear pain, hearing loss, nasal congestion, or runny nose. Patient denies visual disturbance, eye irritation or discharge.  LUNGS: Patient denies cough, wheeze or hemoptysis.  CARDIOVASCULAR: Patient denies chest pain, shortness of breath, palpitations, syncope or lower extremity edema.  GI: Patient denies abdominal pain, nausea, vomiting, diarrhea, constipation, blood in stool or melena.  GENITOURINARY: Patient denies pelvic pain, vaginal discharge, itch or odor. Patient denies irregular vaginal bleeding. Patient denies dysuria, frequency, hematuria, nocturia, urgency or incontinence.  BREASTS: Patient denies breast pain, mass or nipple discharge.  MUSCULOSKELETAL: Patient denies joint swelling, redness or warmth. Positive for joint pain.  NEUROLOGIC: Patient denies headache, vertigo, paresthesias, weakness in limb, dysarthria, dysphagia or abnormality of gait.  PSYCHIATRIC: Patient denies anxiety, depression, or memory loss.     OBJECTIVE:   GENERAL: Well-developed well-nourished slightly overweight black female alert and oriented x3,  in no acute distress. Memory, judgment and cognition without deficit.  Accompanied by her daughter .  SKIN: Clear without rash. Normal color and tone. Lipoma left upper extremity. Ganglion cyst, left dorsal wrist. Small, movable nodule right dorsal foot.  Dry flaky skin, left foot.  HEENT: Eyes: Clear conjunctivae. Pupils equal reactive to light and accommodation. Ears: Clear TMs. Clear canals. Nose: Moderate nasal congestion, left greater than right. Pharynx: Without injection or exudates.  NECK: Supple, normal range of motion. No masses, nodes or enlarged thyroid. No JVD. Carotids 2+ and equal. No bruits.  LUNGS: Clear to auscultation. Normal respiratory effort.  CARDIOVASCULAR: Regular rhythm, normal S1, S2 without murmur, gallop or rub.  ABDOMEN: Normal appearance. Active bowel sounds. Soft, nontender without mass or organomegaly. No rebound or guarding.  EXTREMITIES: Without cyanosis, clubbing or edema. Distal pulses 2+ and equal. Normal range of motion in all extremities. No joint effusion, erythema or warmth.  NEUROLOGIC: Cranial nerves II through XII slightly overweight without deficit. Motor strength equal bilaterally. Sensation normal to touch. Deep tendon reflexes 2+ and equal. Gait without abnormality. No tremor. Negative cerebellar signs.    Recent lab reviewed and acceptable except for chronic anemia.  EKG:  Sinus bradycardia.  Otherwise negative.  Chest x-ray:  No acute cardiopulmonary process.    ASSESSMENT:  1. Ganglion cyst of right foot    2. Essential hypertension    3. Autoimmune hepatitis    4. Hyperlipidemia, unspecified hyperlipidemia type    5. Anemia, unspecified type      PLAN:   1.  Avoid NSAIDs and aspirin 1 week prior to surgery.  2.  Patient is cleared for surgery.    Thank you for allowing me to participate in the care of this patient.

## 2018-08-20 ENCOUNTER — TELEPHONE (OUTPATIENT)
Dept: FAMILY MEDICINE | Facility: CLINIC | Age: 68
End: 2018-08-20

## 2018-08-20 NOTE — TELEPHONE ENCOUNTER
----- Message from Ashley Jane sent at 8/20/2018  2:35 PM CDT -----  Contact: pt  Pt stated she's calling because she want to  per op papers on Thursday, she can be reached at [phone] Thanks

## 2018-09-04 ENCOUNTER — TELEPHONE (OUTPATIENT)
Dept: FAMILY MEDICINE | Facility: CLINIC | Age: 68
End: 2018-09-04

## 2018-09-04 NOTE — TELEPHONE ENCOUNTER
----- Message from Kait Enrique sent at 9/4/2018  2:15 PM CDT -----  Contact: [facility] -[phone]   Please Resend EKG and Labs. Only the first page came through.  Please refax to [phone] or email to: [email or web address]      Please call back at [phone].  Thx-AH

## 2018-09-04 NOTE — TELEPHONE ENCOUNTER
----- Message from Sharon Kent sent at 9/4/2018  3:03 PM CDT -----  Contact: [facility]  Please resend clearance to fax [phone].Please call back at [phone].    Thanks,  Sharon Kent

## 2018-09-19 ENCOUNTER — PES CALL (OUTPATIENT)
Dept: ADMINISTRATIVE | Facility: CLINIC | Age: 68
End: 2018-09-19

## 2018-10-24 RX ORDER — PREDNISONE 5 MG/1
TABLET ORAL
Qty: 30 TABLET | Refills: 3 | Status: SHIPPED | OUTPATIENT
Start: 2018-10-24 | End: 2019-07-22 | Stop reason: SDUPTHER

## 2018-12-18 RX ORDER — LISINOPRIL AND HYDROCHLOROTHIAZIDE 10; 12.5 MG/1; MG/1
1 TABLET ORAL DAILY
Qty: 90 TABLET | Refills: 1 | Status: SHIPPED | OUTPATIENT
Start: 2018-12-18 | End: 2020-02-11

## 2018-12-18 NOTE — TELEPHONE ENCOUNTER
Received a refill request for pt's lisinopril/HCTZ- 90 day rx. Please review and advise. LV 08/16/2018. Last rx filled 08/08/2018.

## 2019-02-15 ENCOUNTER — IMMUNIZATION (OUTPATIENT)
Dept: FAMILY MEDICINE | Facility: CLINIC | Age: 69
End: 2019-02-15
Payer: MEDICARE

## 2019-02-15 PROCEDURE — 90662 IIV NO PRSV INCREASED AG IM: CPT | Mod: PBBFAC,PO

## 2019-02-28 ENCOUNTER — TELEPHONE (OUTPATIENT)
Dept: GASTROENTEROLOGY | Facility: CLINIC | Age: 69
End: 2019-02-28

## 2019-02-28 DIAGNOSIS — K75.4 AUTOIMMUNE HEPATITIS: Primary | ICD-10-CM

## 2019-02-28 NOTE — TELEPHONE ENCOUNTER
Called patient and patient has a scheduled appt 3/5/19 and wanted to know if she need lab work done before her scheduled appt.

## 2019-02-28 NOTE — TELEPHONE ENCOUNTER
----- Message from Vipul Littlejohn sent at 2/28/2019 11:16 AM CST -----  Contact: pt   Pt would like to do lab work on [facility] prior to appt.         ..[phone] (home)

## 2019-03-01 ENCOUNTER — LAB VISIT (OUTPATIENT)
Dept: LAB | Facility: HOSPITAL | Age: 69
End: 2019-03-01
Attending: INTERNAL MEDICINE
Payer: MEDICARE

## 2019-03-01 DIAGNOSIS — K75.4 AUTOIMMUNE HEPATITIS: ICD-10-CM

## 2019-03-01 LAB
ALBUMIN SERPL BCP-MCNC: 3.5 G/DL
ALP SERPL-CCNC: 56 U/L
ALT SERPL W/O P-5'-P-CCNC: 10 U/L
ANION GAP SERPL CALC-SCNC: 7 MMOL/L
AST SERPL-CCNC: 18 U/L
BASOPHILS # BLD AUTO: 0.03 K/UL
BASOPHILS NFR BLD: 0.7 %
BILIRUB SERPL-MCNC: 0.5 MG/DL
BUN SERPL-MCNC: 20 MG/DL
CALCIUM SERPL-MCNC: 9.3 MG/DL
CHLORIDE SERPL-SCNC: 107 MMOL/L
CO2 SERPL-SCNC: 28 MMOL/L
CREAT SERPL-MCNC: 1 MG/DL
DIFFERENTIAL METHOD: ABNORMAL
EOSINOPHIL # BLD AUTO: 0.1 K/UL
EOSINOPHIL NFR BLD: 2.4 %
ERYTHROCYTE [DISTWIDTH] IN BLOOD BY AUTOMATED COUNT: 13.4 %
ERYTHROCYTE [SEDIMENTATION RATE] IN BLOOD BY WESTERGREN METHOD: 29 MM/HR
EST. GFR  (AFRICAN AMERICAN): >60 ML/MIN/1.73 M^2
EST. GFR  (NON AFRICAN AMERICAN): 57.2 ML/MIN/1.73 M^2
GLUCOSE SERPL-MCNC: 78 MG/DL
HCT VFR BLD AUTO: 36.6 %
HGB BLD-MCNC: 11.2 G/DL
IMM GRANULOCYTES # BLD AUTO: 0.03 K/UL
IMM GRANULOCYTES NFR BLD AUTO: 0.7 %
LYMPHOCYTES # BLD AUTO: 1.4 K/UL
LYMPHOCYTES NFR BLD: 29.5 %
MCH RBC QN AUTO: 29.5 PG
MCHC RBC AUTO-ENTMCNC: 30.6 G/DL
MCV RBC AUTO: 96 FL
MONOCYTES # BLD AUTO: 0.5 K/UL
MONOCYTES NFR BLD: 9.8 %
NEUTROPHILS # BLD AUTO: 2.6 K/UL
NEUTROPHILS NFR BLD: 56.9 %
NRBC BLD-RTO: 0 /100 WBC
PLATELET # BLD AUTO: 222 K/UL
PMV BLD AUTO: 10.3 FL
POTASSIUM SERPL-SCNC: 4.3 MMOL/L
PROT SERPL-MCNC: 7.5 G/DL
RBC # BLD AUTO: 3.8 M/UL
SODIUM SERPL-SCNC: 142 MMOL/L
WBC # BLD AUTO: 4.58 K/UL

## 2019-03-01 PROCEDURE — 36415 COLL VENOUS BLD VENIPUNCTURE: CPT | Mod: PO

## 2019-03-01 PROCEDURE — 85652 RBC SED RATE AUTOMATED: CPT

## 2019-03-01 PROCEDURE — 85025 COMPLETE CBC W/AUTO DIFF WBC: CPT

## 2019-03-01 PROCEDURE — 80053 COMPREHEN METABOLIC PANEL: CPT

## 2019-03-05 ENCOUNTER — OFFICE VISIT (OUTPATIENT)
Dept: GASTROENTEROLOGY | Facility: CLINIC | Age: 69
End: 2019-03-05
Payer: MEDICARE

## 2019-03-05 VITALS
DIASTOLIC BLOOD PRESSURE: 84 MMHG | SYSTOLIC BLOOD PRESSURE: 116 MMHG | BODY MASS INDEX: 25.75 KG/M2 | WEIGHT: 154.56 LBS | HEIGHT: 65 IN | HEART RATE: 64 BPM

## 2019-03-05 DIAGNOSIS — R21 RASH, SKIN: ICD-10-CM

## 2019-03-05 DIAGNOSIS — Z80.0 FH: COLON CANCER: ICD-10-CM

## 2019-03-05 DIAGNOSIS — K75.4 AUTOIMMUNE HEPATITIS: Primary | ICD-10-CM

## 2019-03-05 PROCEDURE — 99999 PR PBB SHADOW E&M-EST. PATIENT-LVL III: ICD-10-PCS | Mod: PBBFAC,,, | Performed by: INTERNAL MEDICINE

## 2019-03-05 PROCEDURE — 99213 OFFICE O/P EST LOW 20 MIN: CPT | Mod: PBBFAC | Performed by: INTERNAL MEDICINE

## 2019-03-05 PROCEDURE — 99999 PR PBB SHADOW E&M-EST. PATIENT-LVL III: CPT | Mod: PBBFAC,,, | Performed by: INTERNAL MEDICINE

## 2019-03-05 PROCEDURE — 99213 PR OFFICE/OUTPT VISIT, EST, LEVL III, 20-29 MIN: ICD-10-PCS | Mod: S$PBB,,, | Performed by: INTERNAL MEDICINE

## 2019-03-05 PROCEDURE — 99213 OFFICE O/P EST LOW 20 MIN: CPT | Mod: S$PBB,,, | Performed by: INTERNAL MEDICINE

## 2019-03-05 NOTE — PROGRESS NOTES
Subjective:       Patient ID: Jluianna Montemayor is a 68 y.o. female.    Chief Complaint: Follow-up    Patient is here to follow-up on auto-immune hepatitis. She is on Immuran and prednisone, with the steroids down to 2.5 mg daily for the last several years. Attempts to stop the steroids lead to recurrence of her transaminitis. She now has totally normal LFTs and she is without related complaints. She has evidence of possible fungal infection of left foot. Suggest she see podiatrist she has seen before. This may be more of a problem because of need for steroid therapy even though low dose.    She also has a FH of Colon cancer in 1st degree relative. She had last study in 2016 and no polyps found; only diverticulosis. 2021 is tiime of next study.      Review of Systems   Constitutional: Negative for activity change, appetite change, chills, diaphoresis, fatigue, fever and unexpected weight change.   HENT: Negative for congestion, ear discharge, ear pain, hearing loss, nosebleeds, postnasal drip and tinnitus.    Eyes: Negative for photophobia and visual disturbance.   Respiratory: Negative for apnea, cough, choking, chest tightness, shortness of breath and wheezing.    Cardiovascular: Negative for chest pain, palpitations and leg swelling.   Gastrointestinal: Negative for abdominal distention, abdominal pain, anal bleeding, blood in stool, constipation, diarrhea, nausea, rectal pain and vomiting.   Genitourinary: Negative for difficulty urinating, dyspareunia, dysuria, flank pain, frequency, hematuria, menstrual problem, pelvic pain, urgency, vaginal bleeding and vaginal discharge.   Musculoskeletal: Negative for arthralgias, back pain, gait problem, joint swelling, myalgias and neck stiffness.   Skin: Positive for rash. Negative for pallor.        foot   Neurological: Negative for dizziness, tremors, seizures, syncope, speech difficulty, weakness, numbness and headaches.   Hematological: Negative for adenopathy.    Psychiatric/Behavioral: Negative for agitation, confusion, hallucinations, sleep disturbance and suicidal ideas.       Objective:      Physical Exam   Constitutional: She is oriented to person, place, and time. She appears well-developed and well-nourished.   HENT:   Head: Normocephalic and atraumatic.   Bilateral turbinate congestion   Eyes: Conjunctivae and EOM are normal. Pupils are equal, round, and reactive to light. Right eye exhibits no discharge. Left eye exhibits no discharge. No scleral icterus.   Neck: Normal range of motion. Neck supple. No JVD present. No thyromegaly present.   Cardiovascular: Normal rate, regular rhythm, normal heart sounds and intact distal pulses. Exam reveals no gallop and no friction rub.   No murmur heard.  Pulmonary/Chest: Effort normal and breath sounds normal. No respiratory distress. She has no wheezes. She has no rales. She exhibits no tenderness.   Abdominal: Soft. Bowel sounds are normal. She exhibits no distension and no mass. There is no tenderness. There is no rebound and no guarding.   Musculoskeletal: Normal range of motion. She exhibits no edema.   Lymphadenopathy:     She has no cervical adenopathy.   Neurological: She is alert and oriented to person, place, and time. She has normal reflexes. She exhibits normal muscle tone. Coordination normal.   Skin: Skin is warm and dry. Rash noted. There is erythema. No pallor.   Left foot squamous eruption with mild erythema   Psychiatric: She has a normal mood and affect. Her behavior is normal. Judgment and thought content normal.       Assessment:   Auto-immune Hepatitis  Fungal rash  FH colon cancer   No diagnosis found.    Plan:   Stable  See Podiatrist  Colonoscopy in 1021

## 2019-03-19 ENCOUNTER — PES CALL (OUTPATIENT)
Dept: ADMINISTRATIVE | Facility: CLINIC | Age: 69
End: 2019-03-19

## 2019-04-15 ENCOUNTER — TELEPHONE (OUTPATIENT)
Dept: FAMILY MEDICINE | Facility: CLINIC | Age: 69
End: 2019-04-15

## 2019-04-15 DIAGNOSIS — E78.5 HYPERLIPIDEMIA, UNSPECIFIED HYPERLIPIDEMIA TYPE: Primary | ICD-10-CM

## 2019-04-15 NOTE — TELEPHONE ENCOUNTER
Called and advised patient that her orders have been put it so that she can go and have them done at anytime before her appointment in August. Pt verified understanding verbally, and thanked me for calling. [facility]

## 2019-04-15 NOTE — TELEPHONE ENCOUNTER
----- Message from Alesia Guzman sent at 4/15/2019  7:34 AM CDT -----  please input physical blood order & schedule week prior, call patient with date..[phone]

## 2019-05-16 ENCOUNTER — OFFICE VISIT (OUTPATIENT)
Dept: FAMILY MEDICINE | Facility: CLINIC | Age: 69
End: 2019-05-16
Payer: MEDICARE

## 2019-05-16 ENCOUNTER — LAB VISIT (OUTPATIENT)
Dept: LAB | Facility: HOSPITAL | Age: 69
End: 2019-05-16
Payer: MEDICARE

## 2019-05-16 VITALS
OXYGEN SATURATION: 98 % | HEIGHT: 66 IN | BODY MASS INDEX: 23.42 KG/M2 | HEART RATE: 62 BPM | DIASTOLIC BLOOD PRESSURE: 74 MMHG | WEIGHT: 145.75 LBS | TEMPERATURE: 98 F | SYSTOLIC BLOOD PRESSURE: 118 MMHG

## 2019-05-16 DIAGNOSIS — R63.4 WEIGHT LOSS, ABNORMAL: Primary | ICD-10-CM

## 2019-05-16 DIAGNOSIS — R63.4 WEIGHT LOSS, ABNORMAL: ICD-10-CM

## 2019-05-16 DIAGNOSIS — J01.90 ACUTE RHINOSINUSITIS: ICD-10-CM

## 2019-05-16 DIAGNOSIS — K75.4 AUTOIMMUNE HEPATITIS: Chronic | ICD-10-CM

## 2019-05-16 LAB
ANION GAP SERPL CALC-SCNC: 8 MMOL/L (ref 8–16)
BUN SERPL-MCNC: 22 MG/DL (ref 8–23)
CALCIUM SERPL-MCNC: 9.7 MG/DL (ref 8.7–10.5)
CHLORIDE SERPL-SCNC: 104 MMOL/L (ref 95–110)
CO2 SERPL-SCNC: 26 MMOL/L (ref 23–29)
CREAT SERPL-MCNC: 1 MG/DL (ref 0.5–1.4)
ERYTHROCYTE [SEDIMENTATION RATE] IN BLOOD BY WESTERGREN METHOD: 26 MM/HR (ref 0–36)
EST. GFR  (AFRICAN AMERICAN): >60 ML/MIN/1.73 M^2
EST. GFR  (NON AFRICAN AMERICAN): 57.2 ML/MIN/1.73 M^2
GLUCOSE SERPL-MCNC: 63 MG/DL (ref 70–110)
POTASSIUM SERPL-SCNC: 3.8 MMOL/L (ref 3.5–5.1)
SODIUM SERPL-SCNC: 138 MMOL/L (ref 136–145)
TSH SERPL DL<=0.005 MIU/L-ACNC: 0.47 UIU/ML (ref 0.4–4)

## 2019-05-16 PROCEDURE — 85652 RBC SED RATE AUTOMATED: CPT

## 2019-05-16 PROCEDURE — 99214 PR OFFICE/OUTPT VISIT, EST, LEVL IV, 30-39 MIN: ICD-10-PCS | Mod: S$PBB,,, | Performed by: FAMILY MEDICINE

## 2019-05-16 PROCEDURE — 99213 OFFICE O/P EST LOW 20 MIN: CPT | Mod: PBBFAC,PO | Performed by: FAMILY MEDICINE

## 2019-05-16 PROCEDURE — 99999 PR PBB SHADOW E&M-EST. PATIENT-LVL III: ICD-10-PCS | Mod: PBBFAC,,, | Performed by: FAMILY MEDICINE

## 2019-05-16 PROCEDURE — 99214 OFFICE O/P EST MOD 30 MIN: CPT | Mod: S$PBB,,, | Performed by: FAMILY MEDICINE

## 2019-05-16 PROCEDURE — 36415 COLL VENOUS BLD VENIPUNCTURE: CPT | Mod: PO

## 2019-05-16 PROCEDURE — 80048 BASIC METABOLIC PNL TOTAL CA: CPT

## 2019-05-16 PROCEDURE — 84443 ASSAY THYROID STIM HORMONE: CPT

## 2019-05-16 PROCEDURE — 99999 PR PBB SHADOW E&M-EST. PATIENT-LVL III: CPT | Mod: PBBFAC,,, | Performed by: FAMILY MEDICINE

## 2019-05-16 NOTE — PROGRESS NOTES
CHIEF COMPLAINT:  This is a 68-year-old female complaining of weight loss and sinus symptoms.    SUBJECTIVE:  Patient complains of a 2-3 day history of sinus symptoms with facial pressure, stuffy nose and profuse rhinorrhea especially from left nostril.  She denies fever or chills.  Patient has been taking Robitussin DM even though cough is slight.  Patient has used nasal steroids in the past but discontinued due to epistaxis.  Patient complains of steady weight loss over the last 2 years.  She has lost a total 20 lb in 2 years, 8 lb within the last 2 1/2 months.  Patient denies decreased appetite.  She may be eating more healthy.  Patient denies nausea, vomiting, change in bowel habits or blood in stool.  Patient has autoimmune hepatitis and is being treated with Imuran and oral steroids.    ROS:  GENERAL: Patient denies fever, chills, night sweats. Patient denies weight gain. Patient denies anorexia, fatigue, weakness or swollen glands.  Positive for weight loss.  SKIN: Patient denies rash or hair loss.  HEENT: Patient denies sore throat, ear pain, hearing loss, visual disturbance, eye irritation or discharge.  LUNGS: Patient denies cough, wheeze or hemoptysis.  CARDIOVASCULAR: Patient denies chest pain, shortness of breath, palpitations, syncope or lower extremity edema.  GI: Patient denies abdominal pain, nausea, vomiting, diarrhea, constipation, blood in stool or melena.  GENITOURINARY: Patient denies pelvic pain, vaginal discharge, itch or odor. Patient denies irregular vaginal bleeding. Patient denies dysuria, frequency, hematuria, nocturia, urgency or incontinence.  MUSCULOSKELETAL: Patient denies joint swelling, redness or warmth. Positive for joint pain.  NEUROLOGIC: Patient denies headache, vertigo, paresthesias, weakness in limb, dysarthria, dysphagia or abnormality of gait.  PSYCHIATRIC: Patient denies anxiety, depression, or memory loss.     OBJECTIVE:   GENERAL: Well-developed well-nourished slightly  overweight black female alert and oriented x3, in no acute distress. Memory, judgment and cognition without deficit.  Weight loss of 13 pounds in the last year.  SKIN: Clear without rash. Normal color and tone.   HEENT: Eyes: Clear conjunctivae. Pupils equal reactive to light and accommodation. Ears: Clear TMs. Clear canals. Nose: Moderate nasal congestion, left greater than right. Pharynx: Without injection or exudates.  NECK: Supple, normal range of motion. No masses, nodes or enlarged thyroid. No JVD. Carotids 2+ and equal. No bruits.  LUNGS: Clear to auscultation. Normal respiratory effort.  CARDIOVASCULAR: Regular rhythm, normal S1, S2 without murmur, gallop or rub.  BACK: No CVA or spinal tenderness.  ABDOMEN: Normal appearance. Active bowel sounds. Soft, nontender without mass or organomegaly. No rebound or guarding.  EXTREMITIES: Without cyanosis, clubbing or edema. Distal pulses 2+ and equal. Normal range of motion in all extremities. No joint effusion, erythema or warmth.  NEUROLOGIC: Motor strength equal bilaterally. Sensation normal to touch. Deep tendon reflexes 2+ and equal. Gait without abnormality. No tremor.     ASSESSMENT:  1. Weight loss, abnormal    2. Acute rhinosinusitis    3. Autoimmune hepatitis      PLAN:   1.  OTC nonsedating antihistamine.  2.  Check BMP, TSH and ESR.  3.  Follow up if no improvement or worsening symptoms.  4.  Avoid nasal steroids.    This note is generated with speech recognition software and is subject to transcription error and sound alike phrases that may be missed by proofreading.

## 2019-06-21 ENCOUNTER — TELEPHONE (OUTPATIENT)
Dept: FAMILY MEDICINE | Facility: CLINIC | Age: 69
End: 2019-06-21

## 2019-06-27 ENCOUNTER — OFFICE VISIT (OUTPATIENT)
Dept: FAMILY MEDICINE | Facility: CLINIC | Age: 69
End: 2019-06-27
Payer: MEDICARE

## 2019-06-27 VITALS
OXYGEN SATURATION: 98 % | BODY MASS INDEX: 22.99 KG/M2 | WEIGHT: 143.06 LBS | HEART RATE: 62 BPM | SYSTOLIC BLOOD PRESSURE: 110 MMHG | HEIGHT: 66 IN | TEMPERATURE: 99 F | DIASTOLIC BLOOD PRESSURE: 68 MMHG

## 2019-06-27 DIAGNOSIS — I10 ESSENTIAL HYPERTENSION: Chronic | ICD-10-CM

## 2019-06-27 DIAGNOSIS — Z00.00 ENCOUNTER FOR PREVENTIVE HEALTH EXAMINATION: Primary | ICD-10-CM

## 2019-06-27 DIAGNOSIS — M85.80 OSTEOPENIA, UNSPECIFIED LOCATION: ICD-10-CM

## 2019-06-27 DIAGNOSIS — K75.4 AUTOIMMUNE HEPATITIS: Chronic | ICD-10-CM

## 2019-06-27 DIAGNOSIS — R63.4 WEIGHT LOSS: ICD-10-CM

## 2019-06-27 DIAGNOSIS — E78.5 HYPERLIPIDEMIA, UNSPECIFIED HYPERLIPIDEMIA TYPE: ICD-10-CM

## 2019-06-27 DIAGNOSIS — F41.8 ANXIETY ASSOCIATED WITH DEPRESSION: ICD-10-CM

## 2019-06-27 DIAGNOSIS — E55.9 VITAMIN D DEFICIENCY DISEASE: ICD-10-CM

## 2019-06-27 PROCEDURE — 99999 PR PBB SHADOW E&M-EST. PATIENT-LVL III: ICD-10-PCS | Mod: PBBFAC,,, | Performed by: NURSE PRACTITIONER

## 2019-06-27 PROCEDURE — G0439 PR MEDICARE ANNUAL WELLNESS SUBSEQUENT VISIT: ICD-10-PCS | Mod: S$GLB,,, | Performed by: NURSE PRACTITIONER

## 2019-06-27 PROCEDURE — 99213 OFFICE O/P EST LOW 20 MIN: CPT | Mod: PBBFAC,PO | Performed by: NURSE PRACTITIONER

## 2019-06-27 PROCEDURE — G0439 PPPS, SUBSEQ VISIT: HCPCS | Mod: S$GLB,,, | Performed by: NURSE PRACTITIONER

## 2019-06-27 PROCEDURE — 99999 PR PBB SHADOW E&M-EST. PATIENT-LVL III: CPT | Mod: PBBFAC,,, | Performed by: NURSE PRACTITIONER

## 2019-06-27 NOTE — PATIENT INSTRUCTIONS
Counseling and Referral of Other Preventative  (Italic type indicates deductible and co-insurance are waived)    Patient Name: Julianna Montemayor  Today's Date: 6/27/2019    Health Maintenance       Date Due Completion Date    Shingles Vaccine (1 of 2) 09/03/1998 ---    Mammogram 12/21/2019 (Originally 12/22/2017) 12/22/2015    Override on 5/8/2012: Done    Override on 3/8/2011: Done    Override on 5/28/2008: Done    Override on 5/28/2007: Done    Influenza Vaccine 08/01/2019 2/15/2019    Colonoscopy 08/02/2021 8/2/2016    TETANUS VACCINE 04/27/2022 4/27/2012 (Done)    Override on 4/27/2012: Done    Lipid Panel 07/31/2023 7/31/2018    DEXA SCAN 08/03/2023 8/3/2018    Override on 5/8/2012: Done    Override on 11/6/2009: Done        No orders of the defined types were placed in this encounter.    The following information is provided to all patients.  This information is to help you find resources for any of the problems found today that may be affecting your health:                Living healthy guide: www.[facility].[place].gov      Understanding Diabetes: www.diabetes.org      Eating healthy: www.cdc.gov/healthyweight      CDC home safety checklist: www.cdc.gov/steadi/patient.html      Agency on Aging: www.goea.[place].[facility]      Alcoholics anonymous (AA): www.aa.org      Physical Activity: www.romy.nih.gov/za1nvbc      Tobacco use: www.quitwithusla.org

## 2019-06-27 NOTE — Clinical Note
Your patient was seen today for a HRA visit . I have included a copy of my visit note, please review the note and feel free to contact me with any questions. Thank you for allowing me to participate in the care of your patients. Tianna Hilario NP'

## 2019-06-27 NOTE — PROGRESS NOTES
"Julianna Montemayor presented for a  Medicare AWV and comprehensive Health Risk Assessment today. The following components were reviewed and updated:    · Medical history  · Family History  · Social history  · Allergies and Current Medications  · Health Risk Assessment  · Health Maintenance  · Care Team     ** See Completed Assessments for Annual Wellness Visit within the encounter summary.**       The following assessments were completed:  · Living Situation  · CAGE  · Depression Screening  · Timed Get Up and Go  · Whisper Test  · Cognitive Function Screening  · Nutrition Screening  · ADL Screening  · PAQ Screening    Vitals:    06/27/19 0812   BP: 110/68   BP Location: Left arm   Patient Position: Sitting   Pulse: 62   Temp: 98.6 °F (37 °C)   TempSrc: Tympanic   SpO2: 98%   Weight: 64.9 kg (143 lb 1.3 oz)   Height: 5' 5.5" (1.664 m)     Body mass index is 23.45 kg/m².  Physical Exam   Constitutional: She appears well-developed.   HENT:   Head: Normocephalic and atraumatic.   Eyes: Pupils are equal, round, and reactive to light.   Neck: Carotid bruit is not present.   Cardiovascular: Normal rate, regular rhythm, normal heart sounds, intact distal pulses and normal pulses. Exam reveals no gallop.   No murmur heard.  Pulmonary/Chest: Effort normal and breath sounds normal.   Abdominal: Soft. Normal appearance and bowel sounds are normal. She exhibits no distension. There is no tenderness.   Musculoskeletal: Normal range of motion. She exhibits no edema or tenderness.   Neurological: She is alert. She exhibits normal muscle tone. Gait normal.   Skin: Skin is warm, dry and intact.   Psychiatric: She has a normal mood and affect. Her speech is normal and behavior is normal. Judgment and thought content normal. Cognition and memory are normal.   Nursing note and vitals reviewed.        Diagnoses and health risks identified today and associated recommendations/orders:    1. Encounter for preventive health examination  Completed "     2. Essential hypertension  Chronic and Stable on Zesoteric. Continue current treatment plan as previously prescribed with your PCP    3. Hyperlipidemia, unspecified hyperlipidemia type  Component      Latest Ref Rng & Units 7/31/2018   Cholesterol      120 - 199 mg/dL 240 (H)   Triglycerides      30 - 150 mg/dL 66   HDL      40 - 75 mg/dL 75   LDL Cholesterol External      63.0 - 159.0 mg/dL 151.8   Hdl/Cholesterol Ratio      20.0 - 50.0 % 31.3   Total Cholesterol/HDL Ratio      2.0 - 5.0 3.2   Non-HDL Cholesterol      mg/dL 165   Chronic and Stable. Continue current treatment plan as previously prescribed with your PCP    4. Osteopenia, unspecified location  DEXA 8/ 2018 - due repeat in 5 years   Chronic and Stable on vitamin D and calicum  Continue current treatment plan as previously prescribed with your PCP    5. Vitamin D deficiency disease  Chronic and Stable on vitamin D and calicum  Continue current treatment plan as previously prescribed with your PCP    6. Anxiety associated with depression  This problem is currently not controlled- states she feels depressed and anxious all the time - currently not on a treatment plan - request to wait to discuss with PCP  8/ 2/ 2019     7. Autoimmune hepatitis  Chronic and Stable on Prednisone and Imuran . Continue current treatment plan as previously prescribed with your gastroenterologit    8. Weight loss  Chronic and Ongoing.Pt has loss over  10 lbs in the 3 months -states she eats less in AM and is very active in the daytime but feels she still has a good appetitte- PCP is aware- schedule for an annual exam  8/2/ 2019     I offered to discuss end of life issues, including information on how to make advance directives that the patient could use to name someone who would make medical decisions on their behalf if they became too ill to make themselves  _X_Patient is interested, I provided paper work and offered to discuss.    Provided Julianna with a 5-10 year written  screening schedule and personal prevention plan. Recommendations were developed using the USPSTF age appropriate recommendations. Education, counseling, and referrals were provided as needed. After Visit Summary printed and given to patient which includes a list of additional screenings\tests needed.    Follow up in about 1 year (around 6/27/2020).    Tianna Hilario NP

## 2019-07-22 RX ORDER — PREDNISONE 5 MG/1
2.5 TABLET ORAL DAILY
Qty: 30 TABLET | Refills: 3 | Status: SHIPPED | OUTPATIENT
Start: 2019-07-22 | End: 2020-02-20 | Stop reason: SDUPTHER

## 2019-07-22 RX ORDER — AZATHIOPRINE 50 MG/1
50 TABLET ORAL DAILY
Qty: 90 TABLET | Refills: 5 | Status: SHIPPED | OUTPATIENT
Start: 2019-07-22 | End: 2019-12-09 | Stop reason: SDUPTHER

## 2019-07-22 NOTE — TELEPHONE ENCOUNTER
----- Message from Tonia Edmonds sent at 7/22/2019  8:50 AM CDT -----  Contact: pt  Type:  RX Refill Request    Who Called: pt  Refill or New Rx:refill  RX Name and Strength:prediscone 5 mg  How is the patient currently taking it? (ex. 1XDay):half a tablet once a day   Is this a 30 day or 90 day RX:30  Preferred Pharmacy with phone number:.  [facility]/pharmacy #[id number] - [place] - [address]  [address] [address]  Phone: [phone] Fax: [phone]  Local or Mail Order:local  Ordering Provider:Dr Reeves  Would the patient rather a call back or a response via [facility]? Call back  Best Call Back Number:[phone]  Additional Information: She would like to see if she needs to get a refill on azathioprine 50 mg, 1XDay.    Thank you

## 2019-08-07 ENCOUNTER — OFFICE VISIT (OUTPATIENT)
Dept: FAMILY MEDICINE | Facility: CLINIC | Age: 69
End: 2019-08-07
Payer: MEDICARE

## 2019-08-07 ENCOUNTER — TELEPHONE (OUTPATIENT)
Dept: FAMILY MEDICINE | Facility: CLINIC | Age: 69
End: 2019-08-07

## 2019-08-07 VITALS
SYSTOLIC BLOOD PRESSURE: 114 MMHG | TEMPERATURE: 99 F | BODY MASS INDEX: 23.27 KG/M2 | HEART RATE: 69 BPM | DIASTOLIC BLOOD PRESSURE: 78 MMHG | WEIGHT: 144.81 LBS | OXYGEN SATURATION: 99 % | HEIGHT: 66 IN

## 2019-08-07 DIAGNOSIS — K75.4 AUTOIMMUNE HEPATITIS: Chronic | ICD-10-CM

## 2019-08-07 DIAGNOSIS — E78.5 HYPERLIPIDEMIA, UNSPECIFIED HYPERLIPIDEMIA TYPE: ICD-10-CM

## 2019-08-07 DIAGNOSIS — I10 ESSENTIAL HYPERTENSION: Primary | Chronic | ICD-10-CM

## 2019-08-07 PROBLEM — R63.4 WEIGHT LOSS: Status: RESOLVED | Noted: 2019-06-27 | Resolved: 2019-08-07

## 2019-08-07 PROCEDURE — 99999 PR PBB SHADOW E&M-EST. PATIENT-LVL III: ICD-10-PCS | Mod: PBBFAC,,, | Performed by: FAMILY MEDICINE

## 2019-08-07 PROCEDURE — 99999 PR PBB SHADOW E&M-EST. PATIENT-LVL III: CPT | Mod: PBBFAC,,, | Performed by: FAMILY MEDICINE

## 2019-08-07 PROCEDURE — 99214 PR OFFICE/OUTPT VISIT, EST, LEVL IV, 30-39 MIN: ICD-10-PCS | Mod: S$PBB,,, | Performed by: FAMILY MEDICINE

## 2019-08-07 PROCEDURE — 99214 OFFICE O/P EST MOD 30 MIN: CPT | Mod: S$PBB,,, | Performed by: FAMILY MEDICINE

## 2019-08-07 PROCEDURE — 99213 OFFICE O/P EST LOW 20 MIN: CPT | Mod: PBBFAC,PO | Performed by: FAMILY MEDICINE

## 2019-08-07 RX ORDER — BENZONATATE 200 MG/1
200 CAPSULE ORAL 3 TIMES DAILY PRN
Qty: 20 CAPSULE | Refills: 0 | Status: SHIPPED | OUTPATIENT
Start: 2019-08-07 | End: 2019-08-17

## 2019-08-07 NOTE — TELEPHONE ENCOUNTER
----- Message from Mariia Hilario LPN sent at 8/7/2019  1:47 PM CDT -----  Patient said she forgot to ask if a medication can be prescribed for the hacking cough that she's had. No chest pain noted. Sometimes flemm (thick and white) comes up. /VLW

## 2019-08-07 NOTE — PROGRESS NOTES
CHIEF COMPLAINT:  This is a 68-year-old female here for preventive health exam.    SUBJECTIVE: Patient is doing well without complaints.  The patient's blood pressure is controlled on lisinopril HCT 10-12.5 mg daily.   She is taking Imuran and oral steroids for autoimmune hepatitis.  The patient has osteopenia but refuses bisphosphonates.  The patient complains of a hacking cough productive of thick white mucus and requests medication.     Eye exam August 2018 . Mammogram December 2015 (refuses mammograms). Bone DEXA scan, August 2018, due again in August 2021.  Colonoscopy August 2016, due again in August 2021. Tdap April 2012. Pneumovax April 2012. Flu vaccine, February 2019.  Prevnar vaccine December 2015.     ROS:  GENERAL: Patient denies fever, chills, night sweats. Patient denies weight loss. Patient denies anorexia, fatigue, weakness or swollen glands.  SKIN: Patient denies rash or hair loss.  HEENT: Patient denies sore throat, ear pain, hearing loss, nasal congestion, or runny nose. Patient denies visual disturbance, eye irritation or discharge.  LUNGS: Patient denies cough, wheeze or hemoptysis.  CARDIOVASCULAR: Patient denies chest pain, shortness of breath, palpitations, syncope or lower extremity edema.  GI: Patient denies abdominal pain, nausea, vomiting, diarrhea, constipation, blood in stool or melena.  GENITOURINARY: Patient denies pelvic pain, vaginal discharge, itch or odor. Patient denies irregular vaginal bleeding. Patient denies dysuria, frequency, hematuria, nocturia, urgency or incontinence.  BREASTS: Patient denies breast pain, mass or nipple discharge.  MUSCULOSKELETAL: Patient denies joint swelling, redness or warmth. Positive for joint pain.  NEUROLOGIC: Patient denies headache, vertigo, paresthesias, weakness in limb, dysarthria, dysphagia or abnormality of gait.  PSYCHIATRIC: Patient denies anxiety, depression, or memory loss.     OBJECTIVE:   GENERAL: Well-developed well-nourished black  female alert and oriented x3, in no acute distress. Memory, judgment and cognition without deficit.  SKIN: Clear without rash. Normal color and tone. Lipoma left upper extremity. Ganglion cyst, left dorsal wrist. Small, movable nodule right dorsal foot.  Dry flaky skin, left foot.  HEENT: Eyes: Clear conjunctivae. Pupils equal reactive to light and accommodation. Ears: Clear TMs. Clear canals. Nose: Moderate nasal congestion, left greater than right. Pharynx: Without injection or exudates.  NECK: Supple, normal range of motion. No masses, nodes or enlarged thyroid. No JVD. Carotids 2+ and equal. No bruits.  LUNGS: Clear to auscultation. Normal respiratory effort.  CARDIOVASCULAR: Regular rhythm, normal S1, S2 without murmur, gallop or rub.  BACK: No CVA or spinal tenderness.  BREASTS: No masses, tenderness or nipple discharge.  ABDOMEN: Normal appearance. Active bowel sounds. Soft, nontender without mass or organomegaly. No rebound or guarding.  EXTREMITIES: Without cyanosis, clubbing or edema. Distal pulses 2+ and equal. Normal range of motion in all extremities. No joint effusion, erythema or warmth.  NEUROLOGIC: Cranial nerves II through XII slightly overweight without deficit. Motor strength equal bilaterally. Sensation normal to touch. Deep tendon reflexes 2+ and equal. Gait without abnormality. No tremor. Negative cerebellar signs.  PELVIC: External: Without lesions or inflammation. Vaginal: Clear. Atrophic changes. Slight anterior wall prolapse. Cuff intact. Bimanual: Non-tender, without masses. Rectovaginal: Confirms, heme-negative stool x2.     ASSESSMENT:  1. Essential hypertension    2. Hyperlipidemia, unspecified hyperlipidemia type    3. Autoimmune hepatitis      PLAN:   1.  Exercise regularly.  2.  Age-appropriate counseling.  3.  Recent lab reviewed and acceptable.  4.  Patient refuses mammogram.  5.  Return to clinic for fasting lipid panel.  6.  Handicapped license form filled out.  7.  Follow-up in  6-12 months.  8.  Tessalon Perles 200 mg 3 times daily as needed for cough.    This note is generated with speech recognition software and is subject to transcription error and sound alike phrases that may be missed by proofreading.

## 2019-08-08 ENCOUNTER — LAB VISIT (OUTPATIENT)
Dept: LAB | Facility: HOSPITAL | Age: 69
End: 2019-08-08
Attending: FAMILY MEDICINE
Payer: MEDICARE

## 2019-08-08 DIAGNOSIS — E78.5 HYPERLIPIDEMIA, UNSPECIFIED HYPERLIPIDEMIA TYPE: ICD-10-CM

## 2019-08-08 LAB
CHOLEST SERPL-MCNC: 226 MG/DL (ref 120–199)
CHOLEST/HDLC SERPL: 3.1 {RATIO} (ref 2–5)
HDLC SERPL-MCNC: 73 MG/DL (ref 40–75)
HDLC SERPL: 32.3 % (ref 20–50)
LDLC SERPL CALC-MCNC: 140.4 MG/DL (ref 63–159)
NONHDLC SERPL-MCNC: 153 MG/DL
TRIGL SERPL-MCNC: 63 MG/DL (ref 30–150)

## 2019-08-08 PROCEDURE — 36415 COLL VENOUS BLD VENIPUNCTURE: CPT | Mod: PO

## 2019-08-08 PROCEDURE — 80061 LIPID PANEL: CPT

## 2019-08-22 ENCOUNTER — OFFICE VISIT (OUTPATIENT)
Dept: FAMILY MEDICINE | Facility: CLINIC | Age: 69
End: 2019-08-22
Payer: MEDICARE

## 2019-08-22 VITALS
SYSTOLIC BLOOD PRESSURE: 100 MMHG | HEIGHT: 66 IN | TEMPERATURE: 98 F | DIASTOLIC BLOOD PRESSURE: 66 MMHG | BODY MASS INDEX: 22.85 KG/M2 | RESPIRATION RATE: 18 BRPM | OXYGEN SATURATION: 97 % | HEART RATE: 89 BPM | WEIGHT: 142.19 LBS

## 2019-08-22 DIAGNOSIS — T14.8XXA BRUISING: Primary | ICD-10-CM

## 2019-08-22 DIAGNOSIS — D18.01 HEMANGIOMA OF SKIN: ICD-10-CM

## 2019-08-22 PROCEDURE — 99999 PR PBB SHADOW E&M-EST. PATIENT-LVL III: CPT | Mod: PBBFAC,,, | Performed by: FAMILY MEDICINE

## 2019-08-22 PROCEDURE — 99213 OFFICE O/P EST LOW 20 MIN: CPT | Mod: PBBFAC,PO | Performed by: FAMILY MEDICINE

## 2019-08-22 PROCEDURE — 99213 OFFICE O/P EST LOW 20 MIN: CPT | Mod: S$PBB,,, | Performed by: FAMILY MEDICINE

## 2019-08-22 PROCEDURE — 99999 PR PBB SHADOW E&M-EST. PATIENT-LVL III: ICD-10-PCS | Mod: PBBFAC,,, | Performed by: FAMILY MEDICINE

## 2019-08-22 PROCEDURE — 99213 PR OFFICE/OUTPT VISIT, EST, LEVL III, 20-29 MIN: ICD-10-PCS | Mod: S$PBB,,, | Performed by: FAMILY MEDICINE

## 2019-08-22 NOTE — PROGRESS NOTES
CHIEF COMPLAINT:  This is a 68-year-old female complained about abnormalities of skin.    SUBJECTIVE:  Patient has small red bump on right dorsal forearm that she thinks might have been an insect bite.  She has tiny little areas of redness just proximal to right dorsal wrist.  She also has discolored splotches on left dorsal forearm.  She wonders if these skin lesions could be related to Imuran or prednisone.  Patient does not take aspirin or NSAIDs.      ROS:  GENERAL: Patient denies fever, chills, night sweats. Patient denies weight loss. Patient denies anorexia, fatigue, weakness or swollen glands.  SKIN:  Positive for skin problem.  Patient denies hair loss.  HEENT: Patient denies sore throat, ear pain, hearing loss, nasal congestion, or runny nose. Patient denies visual disturbance, eye irritation or discharge.  LUNGS: Patient denies cough, wheeze or hemoptysis.  CARDIOVASCULAR: Patient denies chest pain, shortness of breath, palpitations, syncope or lower extremity edema.  GI: Patient denies abdominal pain, nausea, vomiting, diarrhea, constipation, blood in stool or melena.     OBJECTIVE:   GENERAL: Well-developed well-nourished black female alert and oriented x3, in no acute distress. Memory, judgment and cognition without deficit.  SKIN:  Tiny cherry hemangiomas right proximal forearm.  Insect bite right dorsal forearm.  Ecchymoses left dorsal forearm.  HEENT: Eyes: Clear conjunctivae.  No scleral icterus.    NECK: Supple, normal range of motion. No masses, nodes or enlarged thyroid. No JVD. Carotids 2+ and equal. No bruits.  LUNGS: Clear to auscultation. Normal respiratory effort.  CARDIOVASCULAR: Regular rhythm, normal S1, S2 without murmur, gallop or rub.    ASSESSMENT:  1. Bruising    2. Hemangioma of skin      PLAN:   1.  Reassurance.  2.  Follow up if no improvement or worsening symptoms.    This note is generated with speech recognition software and is subject to transcription error and sound alike  phrases that may be missed by proofreading.

## 2019-10-21 ENCOUNTER — OFFICE VISIT (OUTPATIENT)
Dept: FAMILY MEDICINE | Facility: CLINIC | Age: 69
End: 2019-10-21
Payer: MEDICARE

## 2019-10-21 VITALS
BODY MASS INDEX: 22.25 KG/M2 | WEIGHT: 138.44 LBS | HEIGHT: 66 IN | DIASTOLIC BLOOD PRESSURE: 70 MMHG | OXYGEN SATURATION: 98 % | SYSTOLIC BLOOD PRESSURE: 122 MMHG | TEMPERATURE: 98 F | HEART RATE: 82 BPM

## 2019-10-21 DIAGNOSIS — R19.09 BULGE IN GROIN AREA: Primary | ICD-10-CM

## 2019-10-21 DIAGNOSIS — Z23 NEED FOR VACCINATION: ICD-10-CM

## 2019-10-21 DIAGNOSIS — J06.9 ACUTE URI: ICD-10-CM

## 2019-10-21 PROCEDURE — 99214 PR OFFICE/OUTPT VISIT, EST, LEVL IV, 30-39 MIN: ICD-10-PCS | Mod: 25,S$PBB,, | Performed by: FAMILY MEDICINE

## 2019-10-21 PROCEDURE — 99999 PR PBB SHADOW E&M-EST. PATIENT-LVL III: CPT | Mod: PBBFAC,,, | Performed by: FAMILY MEDICINE

## 2019-10-21 PROCEDURE — 90662 IIV NO PRSV INCREASED AG IM: CPT | Mod: PBBFAC,PO

## 2019-10-21 PROCEDURE — 99999 PR PBB SHADOW E&M-EST. PATIENT-LVL III: ICD-10-PCS | Mod: PBBFAC,,, | Performed by: FAMILY MEDICINE

## 2019-10-21 PROCEDURE — 99213 OFFICE O/P EST LOW 20 MIN: CPT | Mod: PBBFAC,PO | Performed by: FAMILY MEDICINE

## 2019-10-21 PROCEDURE — 99214 OFFICE O/P EST MOD 30 MIN: CPT | Mod: 25,S$PBB,, | Performed by: FAMILY MEDICINE

## 2019-10-21 NOTE — PROGRESS NOTES
VIS given. Patient tolerated injection well in right deltoid. I asked patient to wait in the clinic for 15 minutes before leaving to verify no adverse reactions. Patient verbally verified understanding./vlw

## 2019-10-21 NOTE — PROGRESS NOTES
CHIEF COMPLAINT:  This is a 69-year-old female complaining of bulging and pain in left lower quadrant.    SUBJECTIVE:  The patient complains of a 3 week history nodule and left lower quadrant/groin which is occasionally painful.  She reports that sometimes nodule is larger or smaller in size.  She denies any history of injury.  Patient continues to have left lower back pain.      Patient complains of 3-5 day history of upper respiratory symptoms.  She complains of postnasal drip and cough productive of thick mucus.  Patient complains of hoarseness.  She has been taking Robitussin with some relief.  She denies sore throat, ear pain, runny nose, chest congestion or shortness of breath.  Patient denies fever or chills.  Patient is concerned about continued weight loss.  She denies anorexia but consumes very little calories throughout the day.  Previous workup was negative.    ROS:  GENERAL: Patient denies fever, chills, night sweats. Patient denies weight gain . Patient denies anorexia, fatigue, weakness or swollen glands.  SKIN: Patient denies rash.  HEENT: Patient denies sore throat, ear pain, hearing loss, or runny nose. Patient denies visual disturbance, eye irritation or discharge. Positive for nasal congestion, hoarseness and postnasal drip.  LUNGS: Patient denies wheeze or hemoptysis.  Positive for cough.  CARDIOVASCULAR: Patient denies chest pain, shortness of breath, palpitations, syncope or lower extremity edema.  GI: Patient denies abdominal pain, nausea, vomiting, diarrhea, constipation, blood in stool or melena.  GENITOURINARY:  Patient denies dysuria, frequency, hematuria, nocturia, urgency or incontinence.  MUSCULOSKELETAL: Patient denies joint swelling, redness or warmth. Positive for joint pain.  NEUROLOGIC: Patient denies headache, vertigo, paresthesias, weakness in limb, or abnormality of gait.     OBJECTIVE:   GENERAL: Well-developed well-nourished black female alert and oriented x3, in no acute  distress. Memory, judgment and cognition without deficit.  Weight loss of 26 lb in the last 2 years.  BMI= 22.35.  SKIN: Clear without rash. Normal color and tone.   HEENT: Eyes: Clear conjunctivae. Pupils equal reactive to light and accommodation. Ears: Clear TMs. Clear canals. Nose: Moderate nasal congestion, left greater than right. Pharynx: Without injection or exudates.  NECK: Supple, normal range of motion. No masses, nodes or enlarged thyroid. No JVD. Carotids 2+ and equal. No bruits.  LUNGS: Clear to auscultation. Normal respiratory effort.  CARDIOVASCULAR: Regular rhythm, normal S1, S2 without murmur, gallop or rub.  BACK: No CVA or spinal tenderness.  ABDOMEN: Normal appearance. Active bowel sounds. Soft, nontender without mass or organomegaly. No rebound or guarding.  Possible left inguinal bulging.    EXTREMITIES: Without cyanosis, clubbing or edema. Distal pulses 2+ and equal. Normal range of motion in all extremities. No joint effusion, erythema or warmth.  NEUROLOGIC: Gait without abnormality. No tremor.     ASSESSMENT:  1. Bulge in groin area    2. Acute URI    3. Need for vaccination      PLAN:   1.  Ultrasound of left groin.  2.  Symptomatic treatment for URI.  3.  Robitussin DM as needed for cough.  4.  Influenza vaccine.    This note is generated with speech recognition software and is subject to transcription error and sound alike phrases that may be missed by proofreading.   HTN (hypertension)

## 2019-10-25 ENCOUNTER — TELEPHONE (OUTPATIENT)
Dept: RADIOLOGY | Facility: HOSPITAL | Age: 69
End: 2019-10-25

## 2019-10-28 ENCOUNTER — HOSPITAL ENCOUNTER (OUTPATIENT)
Dept: RADIOLOGY | Facility: HOSPITAL | Age: 69
Discharge: HOME | End: 2019-10-28
Attending: FAMILY MEDICINE
Payer: MEDICARE

## 2019-10-28 DIAGNOSIS — R19.09 BULGE IN GROIN AREA: ICD-10-CM

## 2019-10-28 PROCEDURE — 76705 ECHO EXAM OF ABDOMEN: CPT | Mod: 26,,, | Performed by: RADIOLOGY

## 2019-10-28 PROCEDURE — 76705 US ABDOMEN LIMITED: ICD-10-PCS | Mod: 26,,, | Performed by: RADIOLOGY

## 2019-10-28 PROCEDURE — 76705 ECHO EXAM OF ABDOMEN: CPT | Mod: TC

## 2019-10-29 ENCOUNTER — TELEPHONE (OUTPATIENT)
Dept: FAMILY MEDICINE | Facility: CLINIC | Age: 69
End: 2019-10-29

## 2019-10-29 DIAGNOSIS — K40.90 LEFT INGUINAL HERNIA: Primary | ICD-10-CM

## 2019-10-29 NOTE — TELEPHONE ENCOUNTER
----- Message from Cristiana Hart MA sent at 10/29/2019  5:31 PM CDT -----  Patient advised she would like to see a surgeon in reference to her hernia.

## 2019-10-31 ENCOUNTER — TELEPHONE (OUTPATIENT)
Dept: FAMILY MEDICINE | Facility: CLINIC | Age: 69
End: 2019-10-31

## 2019-10-31 DIAGNOSIS — K40.90 INGUINAL HERNIA OF LEFT SIDE WITHOUT OBSTRUCTION OR GANGRENE: Primary | ICD-10-CM

## 2019-10-31 NOTE — TELEPHONE ENCOUNTER
Schedule patient with surgeon for evaluation of hernia.  Make sure appointment is with surgeon and not PA.

## 2019-11-06 ENCOUNTER — OFFICE VISIT (OUTPATIENT)
Dept: SURGERY | Facility: CLINIC | Age: 69
End: 2019-11-06
Payer: MEDICARE

## 2019-11-06 VITALS
HEART RATE: 59 BPM | BODY MASS INDEX: 22.89 KG/M2 | DIASTOLIC BLOOD PRESSURE: 73 MMHG | HEIGHT: 66 IN | TEMPERATURE: 98 F | WEIGHT: 142.44 LBS | SYSTOLIC BLOOD PRESSURE: 127 MMHG

## 2019-11-06 DIAGNOSIS — K40.90 LEFT INGUINAL HERNIA: Primary | ICD-10-CM

## 2019-11-06 PROCEDURE — 99203 PR OFFICE/OUTPT VISIT, NEW, LEVL III, 30-44 MIN: ICD-10-PCS | Mod: S$PBB,,, | Performed by: SURGERY

## 2019-11-06 PROCEDURE — 99999 PR PBB SHADOW E&M-EST. PATIENT-LVL IV: ICD-10-PCS | Mod: PBBFAC,,, | Performed by: SURGERY

## 2019-11-06 PROCEDURE — 99203 OFFICE O/P NEW LOW 30 MIN: CPT | Mod: S$PBB,,, | Performed by: SURGERY

## 2019-11-06 PROCEDURE — 99999 PR PBB SHADOW E&M-EST. PATIENT-LVL IV: CPT | Mod: PBBFAC,,, | Performed by: SURGERY

## 2019-11-06 PROCEDURE — 99214 OFFICE O/P EST MOD 30 MIN: CPT | Mod: PBBFAC | Performed by: SURGERY

## 2019-11-06 RX ORDER — LIDOCAINE HYDROCHLORIDE 10 MG/ML
1 INJECTION, SOLUTION EPIDURAL; INFILTRATION; INTRACAUDAL; PERINEURAL ONCE
Status: CANCELLED | OUTPATIENT
Start: 2019-11-06 | End: 2019-11-06

## 2019-11-06 RX ORDER — FERROUS SULFATE 325(65) MG
325 TABLET, DELAYED RELEASE (ENTERIC COATED) ORAL DAILY
COMMUNITY
End: 2023-01-25

## 2019-11-06 RX ORDER — SODIUM CHLORIDE 9 MG/ML
INJECTION, SOLUTION INTRAVENOUS CONTINUOUS
Status: CANCELLED | OUTPATIENT
Start: 2019-11-06

## 2019-11-06 NOTE — PROGRESS NOTES
History & Physical    SUBJECTIVE:     History of Present Illness:  Patient is a 69 y.o. female referred for left inguinal hernia. Patient reports bulge in her left groin that causes her pain. She denies any change in her bowels.  She recently underwent an ultrasound showing small left inguinal hernia defect containing fat and bowel.    Chief Complaint   Patient presents with    Consult       Review of patient's allergies indicates:   Allergen Reactions    No known drug allergies        Current Outpatient Medications   Medication Sig Dispense Refill    azaTHIOprine (IMURAN) 50 mg Tab Take 1 tablet (50 mg total) by mouth once daily. 90 tablet 5    calcium-vitamin D (OSCAL) 250 (625)-125 mg-unit per tablet Take 1 tablet by mouth once daily.      cholecalciferol, vitamin D3, (VITAMIN D3) 5,000 unit Tab Take 1,000 Units by mouth once daily.       ferrous sulfate 325 (65 FE) MG EC tablet Take 325 mg by mouth 3 (three) times daily with meals.      lisinopril-hydrochlorothiazide (PRINZIDE,ZESTORETIC) 10-12.5 mg per tablet Take 1 tablet by mouth once daily. 90 tablet 1    predniSONE (DELTASONE) 5 MG tablet Take 0.5 tablets (2.5 mg total) by mouth once daily. 30 tablet 3     No current facility-administered medications for this visit.        Past Medical History:   Diagnosis Date    Allergic rhinitis     Anemia     Anxiety associated with depression     Autoimmune hepatitis     Chest pain syndrome 7/24/2015    DJD (degenerative joint disease), lumbar     Essential hypertension     Hyperlipidemia     Hypertension     Osteoarthritis of both hips     Osteoarthritis of right knee     Osteopenia     Palpitations 7/24/2015    Sacroiliac joint disease     Tubular adenoma of colon     Vitamin D deficiency disease      Past Surgical History:   Procedure Laterality Date    APPENDECTOMY      COLONOSCOPY N/A 8/2/2016    Procedure: COLONOSCOPY;  Surgeon: Bran Reeves III, MD;  Location: [facility];  Service:  "Endoscopy;  Laterality: N/A;    TOTAL ABDOMINAL HYSTERECTOMY      TOTAL KNEE ARTHROPLASTY Right October 2013     Family History   Problem Relation Age of Onset    Colon cancer Mother     Hypertension Mother     Dementia Mother     Stroke Mother     Cataracts Mother     Hypertension Sister     Cancer Sister     Kidney failure Sister         ESRD    Diabetes Sister     Hypertension Sister     No Known Problems Father     Heart failure Brother     Diabetes Daughter     Lupus Other      Social History     Tobacco Use    Smoking status: Never Smoker    Smokeless tobacco: Never Used   Substance Use Topics    Alcohol use: No    Drug use: No        Review of Systems:  Review of Systems   Constitutional: Negative for chills, fatigue, fever and unexpected weight change.   Respiratory: Negative for cough, shortness of breath, wheezing and stridor.    Cardiovascular: Negative for chest pain, palpitations and leg swelling.   Gastrointestinal: Negative for abdominal distention, abdominal pain, constipation, diarrhea, nausea and vomiting.   Genitourinary: Negative for difficulty urinating, dysuria, frequency, hematuria and urgency.   Skin: Negative for color change, pallor, rash and wound.   Hematological: Does not bruise/bleed easily.       OBJECTIVE:     Vital Signs (Most Recent)  Temp: 98.2 °F (36.8 °C) (11/06/19 0933)  Pulse: (!) 59 (11/06/19 0933)  BP: 127/73 (11/06/19 0933)  5' 6" (1.676 m)  64.6 kg (142 lb 6.7 oz)     Physical Exam:  Physical Exam   Constitutional: She is oriented to person, place, and time. She appears well-developed and well-nourished.   HENT:   Head: Normocephalic and atraumatic.   Eyes: EOM are normal.   Neck: Neck supple.   Cardiovascular: Normal rate and regular rhythm.   Pulmonary/Chest: Effort normal and breath sounds normal.   Abdominal: Soft. Bowel sounds are normal. She exhibits no distension. There is no tenderness. A hernia (Left inguinal) is present.   Neurological: She is " alert and oriented to person, place, and time.   Skin: Skin is warm and dry.   Vitals reviewed.      Diagnostic Results:  Ultrasound:  FINDINGS:  There is a small fascial defect with protruding fat and peristalsing bowel in the left groin consistent with small inguinal hernia.  Hernia neck measures 7 mm.  No mass or lymphadenopathy identified.      Impression       Left inguinal hernia as detailed         ASSESSMENT/PLAN:     69-year-old female with left inguinal hernia    PLAN:Plan     Open left inguinal hernia repair with mesh 01/10/2019 (patient wants to wait till after holidays will call if symptoms worsen for repair sooner)  Preop:  CBC, CMP, EKG  Risk of hernia repair includes infection, bleeding, mesh infection, recurrence, need for further procedure and pain or numbness in the groin that may be long-lasting.

## 2019-11-06 NOTE — LETTER
November 6, 2019      Juhi Wilburn MD  [address] [place] [address]           [facility]  [address] [facility]  [place] [address]  Phone: [phone]  Fax: [phone]          Patient: Julianna Montemayor   MR Number: [id number]   YOB: 1950   Date of Visit: 11/6/2019       Dear Dr. Juhi Wilburn:    Thank you for referring Julianna Montemayor to me for evaluation. Attached you will find relevant portions of my assessment and plan of care.    If you have questions, please do not hesitate to call me. I look forward to following Julianna Montemayor along with you.    Sincerely,    Tevin Mays MD    Enclosure  CC:  No Recipients    If you would like to receive this communication electronically, please contact [email or web address] or [phone] to request more information on [facility] access.    For providers and/or their staff who would like to refer a patient to [facility], please contact us through our one-stop-shop provider referral line, [facility], at [phone].    If you feel you have received this communication in error or would no longer like to receive these types of communications, please e-mail [email or web address]

## 2019-11-11 ENCOUNTER — TELEPHONE (OUTPATIENT)
Dept: SURGERY | Facility: CLINIC | Age: 69
End: 2019-11-11

## 2019-11-11 NOTE — TELEPHONE ENCOUNTER
----- Message from Tevin Mays MD sent at 11/11/2019 10:41 AM CST -----  Contact: Pt   I can do any of the Tuesdays or Fridays and December except for the 24th.  ----- Message -----  From: Jodee Henry MA  Sent: 11/11/2019   9:22 AM CST  To: Tevin Mays MD    Please advise   ----- Message -----  From: Shanon Acosta  Sent: 11/11/2019   8:14 AM CST  To: [facility] Staff    Pt is calling the staff regarding an earlier Appt for Surgery in Dec instead ofd Jan if possible and the dates that are available in Dec.     Pt call back [phone]    Thanks

## 2019-11-14 ENCOUNTER — TELEPHONE (OUTPATIENT)
Dept: SURGERY | Facility: CLINIC | Age: 69
End: 2019-11-14

## 2019-11-14 NOTE — TELEPHONE ENCOUNTER
----- Message from Beth Ely sent at 11/14/2019  7:47 AM CST -----  Contact: pt  Would like to consult with nurse regarding getting her surgery date moved up to a earlier time, please give a call back at [phone].            Thanks,  Beth WILCOX

## 2019-11-21 ENCOUNTER — HOSPITAL ENCOUNTER (OUTPATIENT)
Dept: PREADMISSION TESTING | Facility: HOSPITAL | Age: 69
Discharge: HOME | End: 2019-11-21
Attending: SURGERY
Payer: MEDICARE

## 2019-11-21 VITALS
HEART RATE: 54 BPM | SYSTOLIC BLOOD PRESSURE: 106 MMHG | TEMPERATURE: 98 F | OXYGEN SATURATION: 99 % | DIASTOLIC BLOOD PRESSURE: 61 MMHG | RESPIRATION RATE: 16 BRPM

## 2019-11-21 DIAGNOSIS — Z01.818 PREOP TESTING: ICD-10-CM

## 2019-11-21 DIAGNOSIS — I10 ESSENTIAL HYPERTENSION: Chronic | ICD-10-CM

## 2019-11-21 DIAGNOSIS — K75.4 AUTOIMMUNE HEPATITIS: Primary | Chronic | ICD-10-CM

## 2019-11-21 PROBLEM — K40.90 UNILATERAL INGUINAL HERNIA WITHOUT OBSTRUCTION OR GANGRENE: Status: ACTIVE | Noted: 2019-11-21

## 2019-11-21 LAB
ALBUMIN SERPL BCP-MCNC: 3.9 G/DL (ref 3.5–5.2)
ALP SERPL-CCNC: 41 U/L (ref 55–135)
ALT SERPL W/O P-5'-P-CCNC: 6 U/L (ref 10–44)
ANION GAP SERPL CALC-SCNC: 7 MMOL/L (ref 8–16)
AST SERPL-CCNC: 17 U/L (ref 10–40)
BASOPHILS # BLD AUTO: 0.03 K/UL (ref 0–0.2)
BASOPHILS NFR BLD: 0.7 % (ref 0–1.9)
BILIRUB SERPL-MCNC: 0.7 MG/DL (ref 0.1–1)
BUN SERPL-MCNC: 15 MG/DL (ref 8–23)
CALCIUM SERPL-MCNC: 9.6 MG/DL (ref 8.7–10.5)
CHLORIDE SERPL-SCNC: 105 MMOL/L (ref 95–110)
CO2 SERPL-SCNC: 29 MMOL/L (ref 23–29)
CREAT SERPL-MCNC: 0.9 MG/DL (ref 0.5–1.4)
DIFFERENTIAL METHOD: ABNORMAL
EOSINOPHIL # BLD AUTO: 0.1 K/UL (ref 0–0.5)
EOSINOPHIL NFR BLD: 1.6 % (ref 0–8)
ERYTHROCYTE [DISTWIDTH] IN BLOOD BY AUTOMATED COUNT: 13.2 % (ref 11.5–14.5)
EST. GFR  (AFRICAN AMERICAN): >60 ML/MIN/1.73 M^2
EST. GFR  (NON AFRICAN AMERICAN): >60 ML/MIN/1.73 M^2
GLUCOSE SERPL-MCNC: 76 MG/DL (ref 70–110)
HCT VFR BLD AUTO: 35.6 % (ref 37–48.5)
HGB BLD-MCNC: 11.1 G/DL (ref 12–16)
IMM GRANULOCYTES # BLD AUTO: 0.01 K/UL (ref 0–0.04)
IMM GRANULOCYTES NFR BLD AUTO: 0.2 % (ref 0–0.5)
INR PPP: 1 (ref 0.8–1.2)
LYMPHOCYTES # BLD AUTO: 1.5 K/UL (ref 1–4.8)
LYMPHOCYTES NFR BLD: 34.7 % (ref 18–48)
MCH RBC QN AUTO: 29.8 PG (ref 27–31)
MCHC RBC AUTO-ENTMCNC: 31.2 G/DL (ref 32–36)
MCV RBC AUTO: 96 FL (ref 82–98)
MONOCYTES # BLD AUTO: 0.5 K/UL (ref 0.3–1)
MONOCYTES NFR BLD: 12 % (ref 4–15)
NEUTROPHILS # BLD AUTO: 2.3 K/UL (ref 1.8–7.7)
NEUTROPHILS NFR BLD: 51 % (ref 38–73)
NRBC BLD-RTO: 0 /100 WBC
PLATELET # BLD AUTO: 184 K/UL (ref 150–350)
PMV BLD AUTO: 9.9 FL (ref 9.2–12.9)
POTASSIUM SERPL-SCNC: 3.8 MMOL/L (ref 3.5–5.1)
PROT SERPL-MCNC: 7.7 G/DL (ref 6–8.4)
PROTHROMBIN TIME: 10.9 SEC (ref 9–12.5)
RBC # BLD AUTO: 3.72 M/UL (ref 4–5.4)
SODIUM SERPL-SCNC: 141 MMOL/L (ref 136–145)
WBC # BLD AUTO: 4.41 K/UL (ref 3.9–12.7)

## 2019-11-21 PROCEDURE — 93005 ELECTROCARDIOGRAM TRACING: CPT

## 2019-11-21 PROCEDURE — 80053 COMPREHEN METABOLIC PANEL: CPT

## 2019-11-21 PROCEDURE — 93010 ELECTROCARDIOGRAM REPORT: CPT | Mod: ,,, | Performed by: INTERNAL MEDICINE

## 2019-11-21 PROCEDURE — 85025 COMPLETE CBC W/AUTO DIFF WBC: CPT

## 2019-11-21 PROCEDURE — 93010 EKG 12-LEAD: ICD-10-PCS | Mod: ,,, | Performed by: INTERNAL MEDICINE

## 2019-11-21 PROCEDURE — 85610 PROTHROMBIN TIME: CPT

## 2019-11-21 NOTE — ASSESSMENT & PLAN NOTE
The patient will have a left inguinal hernia repair by Dr. Mays on 12/10/19    Known risk factors for perioperative complications:     Immunosuppression     Difficulty with intubation is not anticipated.    Cardiac Risk Estimation: low intraoperative cardiac risk    1.) Preoperative workup as follows: ECG, hemoglobin, hematocrit, electrolytes, creatinine, glucose, coagulation studies.  2.) Change in medication regimen before surgery:Hold Lisinopril/HCTZ the day before and day of surgery   3.) Prophylaxis for cardiac events with perioperative beta-blockers: not indicated.  4.) Invasive hemodynamic monitoring perioperatively: not indicated.  5.) Deep vein thrombosis prophylaxis postoperatively: regimen to be chosen by surgical team.  6.) Surveillance for postoperative MI with ECG immediately postoperatively and on postoperati ve days 1 and 2 AND troponin levels 24 hours postoperatively and on day 4 or hospital discharge (whichever comes first): not indicated.  7.) Current medications which may produce withdrawal symptoms if withheld perioperatively: prednisone  8.) Other measures: Will discuss Imuran and preoperative management with Dr. Reeves

## 2019-11-21 NOTE — H&P
Preoperative History and Physical                                                             Hospital Medicine      Chief Complaint: Preoperative evaluation     History of Present Illness:      Julianna Montemayor is a 69 y.o. female with Autoimmune hepatitis, chronic immunosuppression, Anemia, HTN, inguinal hernia who presents to the office today for a preoperative consultation at the request of Dr. Mays who plans on performing Left inguinal hernia repair on December 10.     Functional Status:      The patient is able to climb a flight of stairs. The patient is able to ambulate 3 blocks without difficulty. The patient's functional status is not affected by the surgical problem. The patient's functional status is not affected by shortness of breath, chest pain, dyspnea on exertion and fatigue.    MET score greater than 4    Past Medical History:      Past Medical History:   Diagnosis Date    Allergic rhinitis     Anemia     Anxiety associated with depression     Autoimmune hepatitis     DJD (degenerative joint disease), lumbar     Essential hypertension     Hyperlipidemia     Osteoarthritis of both hips     Osteoarthritis of right knee     Osteopenia     Sacroiliac joint disease     Tubular adenoma of colon     Vitamin D deficiency disease         Past Surgical History:      Past Surgical History:   Procedure Laterality Date    APPENDECTOMY      COLONOSCOPY N/A 8/2/2016    Procedure: COLONOSCOPY;  Surgeon: Bran Reeves III, MD;  Location: [facility];  Service: Endoscopy;  Laterality: N/A;    ORIF FINGER FRACTURE Right     right middle finger    TOTAL ABDOMINAL HYSTERECTOMY      TOTAL KNEE ARTHROPLASTY Right October 2013        Social History:      Social History     Socioeconomic History    Marital status:      Spouse name: Not on file    Number of children: Not on file    Years of education: Not on file    Highest education level: Not on  file   Occupational History    Not on file   Social Needs    Financial resource strain: Not on file    Food insecurity:     Worry: Not on file     Inability: Not on file    Transportation needs:     Medical: Not on file     Non-medical: Not on file   Tobacco Use    Smoking status: Never Smoker    Smokeless tobacco: Never Used   Substance and Sexual Activity    Alcohol use: No    Drug use: No    Sexual activity: Yes     Partners: Male   Lifestyle    Physical activity:     Days per week: Not on file     Minutes per session: Not on file    Stress: Not on file   Relationships    Social connections:     Talks on phone: Not on file     Gets together: Not on file     Attends [employer] service: Not on file     Active member of club or organization: Not on file     Attends meetings of clubs or organizations: Not on file     Relationship status: Not on file   Other Topics Concern    Not on file   Social History Narrative    The patient is  to a smoker who smokes outside the home. 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 She has 2 adult children.         Family History:      Family History   Problem Relation Age of Onset    Colon cancer Mother 89    Hypertension Mother     Dementia Mother     Stroke Mother 70    Cataracts Mother     Hypertension Sister     Cancer Sister     Kidney failure Sister         ESRD    Diabetes Sister     Hypertension Sister     No Known Problems Father     Heart attack Brother 48    Diabetes Daughter     Lupus Other        Allergies:      Review of patient's allergies indicates:   Allergen Reactions    No known drug allergies        Medications:      Current Outpatient Medications   Medication Sig    azaTHIOprine (IMURAN) 50 mg Tab Take 1 tablet (50 mg total) by mouth once daily.    calcium-vitamin D (OSCAL) 250 (625)-125 mg-unit per tablet Take 1 tablet by mouth once daily.    cholecalciferol, vitamin D3, (VITAMIN D3)  5,000 unit Tab Take 5,000 Units by mouth once daily.     ferrous sulfate 325 (65 FE) MG EC tablet Take 325 mg by mouth once daily.     lisinopril-hydrochlorothiazide (PRINZIDE,ZESTORETIC) 10-12.5 mg per tablet Take 1 tablet by mouth once daily.    predniSONE (DELTASONE) 5 MG tablet Take 0.5 tablets (2.5 mg total) by mouth once daily. (Patient taking differently: Take 2.5 mg by mouth once daily. Take am of surgery)     No current facility-administered medications for this encounter.        Vitals:      Vitals:    11/21/19 0859   BP: 106/61   Pulse: (!) 54   Resp: 16   Temp: 98.2 °F (36.8 °C)       Review of Systems:        Constitutional: Negative for fever, chills, weight loss, malaise/fatigue and diaphoresis.   HENT: Negative for hearing loss, ear pain, nosebleeds, congestion, sore throat, neck pain, tinnitus and ear discharge.    Eyes: Negative for blurred vision, double vision, photophobia, pain, discharge and redness.   Respiratory: Negative for cough, hemoptysis, sputum production, shortness of breath, wheezing and stridor.    Cardiovascular: Negative for chest pain, palpitations, orthopnea, claudication, leg swelling and PND.   Gastrointestinal:l: +intermittnet pain to left inguinal area +constipation Negative for heartburn, nausea, vomiting, abdominal pain, diarrhea,blood in stool and melena.   Genitourinary: Negative for dysuria, urgency, frequency, hematuria and flank pain.   Musculoskeletal Negative for myalgias, back pain, joint pain and falls.   Skin: Negative for itching and rash.   Neurological: Negative for dizziness, tingling, tremors, sensory change, speech change, focal weakness, seizures, loss of consciousness, weakness and headaches.   Endo/Heme/Allergies: Negative for environmental allergies and polydipsia. Does not bruise/bleed easily.   Psychiatric/Behavioral: Negative for depression, suicidal ideas, hallucinations, memory loss and substance abuse. The patient is not nervous/anxious and  does not have insomnia.    All 14 systems reviewed and negative except as noted above.    Physical Exam:      Constitutional: Appears well-developed, well-nourished and in no acute distress.  Patient is oriented to person, place, and time.   Head: Normocephalic and atraumatic. Mucous membranes moist.  Neck: Neck supple no mass.   Cardiovascular: Normal rate and regular rhythm.  S1 S2 appreciated by ascultation.  Pulmonary/Chest: Effort normal and clear to auscultation bilaterally. No respiratory distress.   Abdomen: Soft. Non-tender and non-distended. Bowel sounds are normal.   Neurological: Patient is alert and oriented to person, place and time. Moves all extremities.  Skin: Warm and dry. No lesions.  Extremities: No clubbing, cyanosis or edema.    Laboratory data:      Reviewed and noted in plan where applicable. Please see chart for full laboratory data.    No results for input(s): CPK, CPKMB, TROPONINI, MB in the last 24 hours. No results for input(s): POCTGLUCOSE in the last 24 hours.     Lab Results   Component Value Date    INR 1.0 2019    INR 1.1 10/13/2008       Lab Results   Component Value Date    WBC 4.41 2019    HGB 11.1 (L) 2019    HCT 35.6 (L) 2019    MCV 96 2019     2019       Recent Labs   Lab 19  0912   GLU 76      K 3.8      CO2 29   BUN 15   CREATININE 0.9   CALCIUM 9.6       Predictors of intubation difficulty:       Morbid obesity? no   Anatomically abnormal facies? no   Prominent incisors? no   Receding mandible? no   Short, thick neck? no   Neck range of motion: normal   Dentition: Fractured right upper tooth, multiple missing teeth back upper/lower     Cardiographics:      EC19  Sinus bradycardia  Otherwise normal ECG  When compared with ECG of 16-AUG-2018 10:13,  No significant change was found    Echocardiogram:7/30/15  CONCLUSIONS     1 - Normal left ventricular systolic function (EF 60-65%).     2 - Normal left  ventricular diastolic function.     3 - Normal right ventricular systolic function . 4 - Mild tricuspid regurgitation.     Stress test 7/30/15  Negative for ischemia     Holter monitor 7/30/15  Moderate amount of monomorphic PVCs totalling 1486 and averaging 30 per hour.  There were 47 bigeminal cycles.   Dr. Santiago recommended decreasing caffeine     Imaging:      Chest x-ray:8/16/18  Cardiac silhouette and mediastinal contours are normal.  Lungs no acute opacity.  Stable right apical scarring present.  Osseous structures are intact.  Assessment and Plan:      Unilateral inguinal hernia without obstruction or gangrene  The patient will have a left inguinal hernia repair by Dr. Mays on 12/10/19    Known risk factors for perioperative complications:     Immunosuppression     Difficulty with intubation is not anticipated.    Cardiac Risk Estimation: low intraoperative cardiac risk    1.) Preoperative workup as follows: ECG, hemoglobin, hematocrit, electrolytes, creatinine, glucose, coagulation studies.  2.) Change in medication regimen before surgery:Hold Lisinopril/HCTZ the day before and day of surgery   3.) Prophylaxis for cardiac events with perioperative beta-blockers: not indicated.  4.) Invasive hemodynamic monitoring perioperatively: not indicated.  5.) Deep vein thrombosis prophylaxis postoperatively: regimen to be chosen by surgical team.  6.) Surveillance for postoperative MI with ECG immediately postoperatively and on postoperati ve days 1 and 2 AND troponin levels 24 hours postoperatively and on day 4 or hospital discharge (whichever comes first): not indicated.  7.) Current medications which may produce withdrawal symptoms if withheld perioperatively: prednisone  8.) Other measures: Will discuss Imuran and preoperative management with Dr. Reeves     Autoimmune hepatitis  Continue prednisone 2.5mg daily and Imuran   Will discuss Imuran and preoperative management with Dr. Reeves     Essential  hypertension  Cont Lisinopril/HCTZ  /61  Instructed pt to hold the day before and day of surgery     Chronic immunosuppression  Surgical site infection prevention measures discussed with patient     Normocytic anemia   Recheck CBC  Appears stable     Addendum:  Discussed Imuran with Dr. Reeves- tricia one week prior to surgery

## 2019-11-21 NOTE — DISCHARGE INSTRUCTIONS
To confirm, Your doctor has instructed you that surgery is scheduled for 12/10/2019.       Please report to [facility].  Pre admit office will call afternoon prior to surgery with final arrival time    INSTRUCTIONS IMPORTANT!!!   Do not eat, drink, or smoke after 12 midnight-including water. OK to brush teeth, no gum, candy or mints!    ¨ Take only these medicines with a small swallow of water-morning of surgery.  Prednisone    Pre operative instructions:  Please review the Pre-Operative Instruction booklet that you were given.        Bathing Instructions--See page 6 in the Pre-operative booklet.      Prevention of surgical site infections:     -Keep incisions clean and dry.   -Do not soak/submerge incisions in water until completely healed.   -Do not apply lotions, powders, creams, or deodorants to site.   -Always make sure hands are cleaned with antibacterial soap/ alcohol-based  prior to touching the surgical site.  (This includes doctors, nurses, staff, and yourself.)    Signs and symptoms:   -Redness and pain around the area where you had surgery   -Drainage of cloudy fluid from your surgical wound   -Fever over 100.4       I have read or had read and explained to me, and understand the above information.  Additional comments or instructions:  Received a copy of Pre-operative instructions booklet, FAQ surgical site infection sheet, and packets of hibiclens (if indicated).

## 2019-11-21 NOTE — ASSESSMENT & PLAN NOTE
Continue prednisone 2.5mg daily and Imuran   Will discuss Imuran and preoperative management with Dr. Reeves

## 2019-11-22 RX ORDER — FAMOTIDINE 20 MG/1
20 TABLET, FILM COATED ORAL
Status: CANCELLED | OUTPATIENT
Start: 2019-11-22 | End: 2019-11-22

## 2019-12-06 NOTE — PRE ADMISSION SCREENING
Pre op instructions reviewed with patient per phone:    To confirm, Your surgeon has instructed you:  Surgery is scheduled 12/10/19 at 1100.      Please report to [facility] floor main lobby by 0930.   Pre admit office to call afternoon prior to surgery with final arrival time      INSTRUCTIONS IMPORTANT!!!  ¨ Do not eat, drink, or smoke after 12 midnight-including water. OK to brush teeth, no gum, candy or mints!    ¨ Take only these medicines with a small swallow of water-morning of surgery.  Prednisone    ____  Do not wear makeup, including mascara.  ____  No powder, lotions or creams to surgical area.  ____  Please remove all jewelry, including piercings and leave at home.  ____  No money or valuables needed. Please leave at home.  ____  Please bring identification and insurance information to hospital.  ____  If going home the same day, arrange for a ride home. You will not be able to   drive if Anesthesia was used.  ____  Children, under 12 years old, must remain in the waiting room with an adult.  They are not allowed in patient areas.  ____  Wear loose fitting clothing. Allow for dressings, bandages.  ____  Stop Aspirin, Ibuprofen, Motrin and Aleve at least 5-7 days before surgery, unless otherwise instructed by your doctor, or the nurse.   You MAY use Tylenol/acetaminophen until day of surgery.  ____  If you take diabetic medication, do not take am of surgery unless instructed by   Doctor.  ____ Stop taking any Fish Oil supplement or any Vitamins that contain Vitamin E at least 5 days prior to surgery.          Bathing Instructions-- The night before surgery and the morning prior to coming to the hospital:   -Do not shave the surgical area.   -Shower and wash your hair and body as usual with anti-bacterial  soap and shampoo.   -Rinse your hair and body completely.   -Use one packet of hibiclens to wash the surgical site (using your hand) gently for 5 minutes.  Do not scrub you  skin too hard.   -Do not use hibiclens on your head, face, or genitals.   -Do not wash with anti-bacterial soap after you use the hibiclens.   -Rinse your body thoroughly.   -Dry with clean, soft towel.  Do not use lotion, cream, deodorant, or powders on   the surgical site.    Use antibacterial soap in place of hibiclens if your surgery is on the head, face or genitals.         Surgical Site Infection    Prevention of surgical site infections:     -Keep incisions clean and dry.   -Do not soak/submerge incisions in water until completely healed.   -Do not apply lotions, powders, creams, or deodorants to site.   -Always make sure hands are cleaned with antibacterial soap/ alcohol-based   prior to touching the surgical site.  (This includes doctors, nurses, staff, and yourself.)    Signs and symptoms:   -Redness and pain around the area where you had surgery   -Drainage of cloudy fluid from your surgical wound   -Fever over 100.4  I have read or had read and explained to me, and understand the above information.

## 2019-12-09 RX ORDER — AZATHIOPRINE 50 MG/1
50 TABLET ORAL DAILY
Qty: 90 TABLET | Refills: 5 | Status: SHIPPED | OUTPATIENT
Start: 2019-12-09 | End: 2021-01-22 | Stop reason: SDUPTHER

## 2019-12-10 ENCOUNTER — ANESTHESIA (OUTPATIENT)
Dept: SURGERY | Facility: HOSPITAL | Age: 69
End: 2019-12-10
Payer: MEDICARE

## 2019-12-10 ENCOUNTER — HOSPITAL ENCOUNTER (OUTPATIENT)
Facility: HOSPITAL | Age: 69
Discharge: HOME | End: 2019-12-10
Attending: SURGERY | Admitting: SURGERY
Payer: MEDICARE

## 2019-12-10 ENCOUNTER — ANESTHESIA EVENT (OUTPATIENT)
Dept: SURGERY | Facility: HOSPITAL | Age: 69
End: 2019-12-10
Payer: MEDICARE

## 2019-12-10 DIAGNOSIS — K40.90 LEFT INGUINAL HERNIA: ICD-10-CM

## 2019-12-10 PROCEDURE — C1729 CATH, DRAINAGE: HCPCS | Performed by: SURGERY

## 2019-12-10 PROCEDURE — 71000015 HC POSTOP RECOV 1ST HR: Performed by: SURGERY

## 2019-12-10 PROCEDURE — 36000706: Performed by: SURGERY

## 2019-12-10 PROCEDURE — 37000008 HC ANESTHESIA 1ST 15 MINUTES: Performed by: SURGERY

## 2019-12-10 PROCEDURE — 25000003 PHARM REV CODE 250: Performed by: NURSE ANESTHETIST, CERTIFIED REGISTERED

## 2019-12-10 PROCEDURE — 49505 PRP I/HERN INIT REDUC >5 YR: CPT | Mod: AS,LT,, | Performed by: PHYSICIAN ASSISTANT

## 2019-12-10 PROCEDURE — 63600175 PHARM REV CODE 636 W HCPCS: Performed by: SURGERY

## 2019-12-10 PROCEDURE — 49505 PR REPAIR ING HERNIA,5+Y/O,REDUCIBL: ICD-10-PCS | Mod: LT,,, | Performed by: SURGERY

## 2019-12-10 PROCEDURE — 63600175 PHARM REV CODE 636 W HCPCS: Performed by: NURSE ANESTHETIST, CERTIFIED REGISTERED

## 2019-12-10 PROCEDURE — 37000009 HC ANESTHESIA EA ADD 15 MINS: Performed by: SURGERY

## 2019-12-10 PROCEDURE — 36000707: Performed by: SURGERY

## 2019-12-10 PROCEDURE — 25000003 PHARM REV CODE 250: Performed by: SURGERY

## 2019-12-10 PROCEDURE — 71000016 HC POSTOP RECOV ADDL HR: Performed by: SURGERY

## 2019-12-10 PROCEDURE — 63600175 PHARM REV CODE 636 W HCPCS: Performed by: ANESTHESIOLOGY

## 2019-12-10 PROCEDURE — 49505 PR REPAIR ING HERNIA,5+Y/O,REDUCIBL: ICD-10-PCS | Mod: AS,LT,, | Performed by: PHYSICIAN ASSISTANT

## 2019-12-10 PROCEDURE — 49505 PRP I/HERN INIT REDUC >5 YR: CPT | Mod: LT,,, | Performed by: SURGERY

## 2019-12-10 PROCEDURE — 71000033 HC RECOVERY, INTIAL HOUR: Performed by: SURGERY

## 2019-12-10 PROCEDURE — C1781 MESH (IMPLANTABLE): HCPCS | Performed by: SURGERY

## 2019-12-10 DEVICE — MESH PARIETEX PROGRIP LEFT: Type: IMPLANTABLE DEVICE | Site: INGUINAL | Status: FUNCTIONAL

## 2019-12-10 RX ORDER — HYDROMORPHONE HYDROCHLORIDE 2 MG/ML
0.2 INJECTION, SOLUTION INTRAMUSCULAR; INTRAVENOUS; SUBCUTANEOUS EVERY 5 MIN PRN
Status: DISCONTINUED | OUTPATIENT
Start: 2019-12-10 | End: 2019-12-10 | Stop reason: HOSPADM

## 2019-12-10 RX ORDER — NEOSTIGMINE METHYLSULFATE 1 MG/ML
INJECTION, SOLUTION INTRAVENOUS
Status: DISCONTINUED | OUTPATIENT
Start: 2019-12-10 | End: 2019-12-10

## 2019-12-10 RX ORDER — SODIUM CHLORIDE 9 MG/ML
INJECTION, SOLUTION INTRAVENOUS CONTINUOUS
Status: DISCONTINUED | OUTPATIENT
Start: 2019-12-10 | End: 2019-12-10 | Stop reason: HOSPADM

## 2019-12-10 RX ORDER — PROPOFOL 10 MG/ML
VIAL (ML) INTRAVENOUS
Status: DISCONTINUED | OUTPATIENT
Start: 2019-12-10 | End: 2019-12-10

## 2019-12-10 RX ORDER — FENTANYL CITRATE 50 UG/ML
INJECTION, SOLUTION INTRAMUSCULAR; INTRAVENOUS
Status: DISCONTINUED | OUTPATIENT
Start: 2019-12-10 | End: 2019-12-10

## 2019-12-10 RX ORDER — GLYCOPYRROLATE 0.2 MG/ML
INJECTION INTRAMUSCULAR; INTRAVENOUS
Status: DISCONTINUED | OUTPATIENT
Start: 2019-12-10 | End: 2019-12-10

## 2019-12-10 RX ORDER — ROCURONIUM BROMIDE 10 MG/ML
INJECTION, SOLUTION INTRAVENOUS
Status: DISCONTINUED | OUTPATIENT
Start: 2019-12-10 | End: 2019-12-10

## 2019-12-10 RX ORDER — SODIUM CHLORIDE, SODIUM LACTATE, POTASSIUM CHLORIDE, CALCIUM CHLORIDE 600; 310; 30; 20 MG/100ML; MG/100ML; MG/100ML; MG/100ML
INJECTION, SOLUTION INTRAVENOUS CONTINUOUS PRN
Status: DISCONTINUED | OUTPATIENT
Start: 2019-12-10 | End: 2019-12-10

## 2019-12-10 RX ORDER — LIDOCAINE HYDROCHLORIDE 10 MG/ML
1 INJECTION, SOLUTION EPIDURAL; INFILTRATION; INTRACAUDAL; PERINEURAL ONCE
Status: DISCONTINUED | OUTPATIENT
Start: 2019-12-10 | End: 2019-12-10 | Stop reason: HOSPADM

## 2019-12-10 RX ORDER — SODIUM CHLORIDE 0.9 % (FLUSH) 0.9 %
3 SYRINGE (ML) INJECTION EVERY 8 HOURS
Status: DISCONTINUED | OUTPATIENT
Start: 2019-12-10 | End: 2019-12-10 | Stop reason: HOSPADM

## 2019-12-10 RX ORDER — HYDROCODONE BITARTRATE AND ACETAMINOPHEN 5; 325 MG/1; MG/1
1 TABLET ORAL EVERY 4 HOURS PRN
Status: DISCONTINUED | OUTPATIENT
Start: 2019-12-10 | End: 2019-12-10 | Stop reason: HOSPADM

## 2019-12-10 RX ORDER — EPHEDRINE SULFATE 50 MG/ML
INJECTION, SOLUTION INTRAVENOUS
Status: DISCONTINUED | OUTPATIENT
Start: 2019-12-10 | End: 2019-12-10

## 2019-12-10 RX ORDER — BUPIVACAINE HYDROCHLORIDE 2.5 MG/ML
INJECTION, SOLUTION EPIDURAL; INFILTRATION; INTRACAUDAL
Status: DISCONTINUED | OUTPATIENT
Start: 2019-12-10 | End: 2019-12-10 | Stop reason: HOSPADM

## 2019-12-10 RX ORDER — DOCUSATE SODIUM 100 MG/1
100 CAPSULE, LIQUID FILLED ORAL 2 TIMES DAILY PRN
Qty: 30 CAPSULE | Refills: 0 | Status: SHIPPED | OUTPATIENT
Start: 2019-12-10 | End: 2019-12-25

## 2019-12-10 RX ORDER — HYDROCODONE BITARTRATE AND ACETAMINOPHEN 10; 325 MG/1; MG/1
1 TABLET ORAL EVERY 4 HOURS PRN
Status: DISCONTINUED | OUTPATIENT
Start: 2019-12-10 | End: 2019-12-10 | Stop reason: HOSPADM

## 2019-12-10 RX ORDER — LIDOCAINE HYDROCHLORIDE 10 MG/ML
INJECTION, SOLUTION EPIDURAL; INFILTRATION; INTRACAUDAL; PERINEURAL
Status: DISCONTINUED | OUTPATIENT
Start: 2019-12-10 | End: 2019-12-10 | Stop reason: HOSPADM

## 2019-12-10 RX ORDER — HYDROCODONE BITARTRATE AND ACETAMINOPHEN 5; 325 MG/1; MG/1
1 TABLET ORAL EVERY 4 HOURS PRN
Qty: 20 TABLET | Refills: 0 | Status: SHIPPED | OUTPATIENT
Start: 2019-12-10 | End: 2020-03-09 | Stop reason: ALTCHOICE

## 2019-12-10 RX ORDER — MEPERIDINE HYDROCHLORIDE 50 MG/ML
12.5 INJECTION INTRAMUSCULAR; INTRAVENOUS; SUBCUTANEOUS ONCE AS NEEDED
Status: DISCONTINUED | OUTPATIENT
Start: 2019-12-10 | End: 2019-12-10 | Stop reason: HOSPADM

## 2019-12-10 RX ORDER — FAMOTIDINE 20 MG/1
20 TABLET, FILM COATED ORAL
Status: DISCONTINUED | OUTPATIENT
Start: 2019-12-10 | End: 2019-12-10 | Stop reason: HOSPADM

## 2019-12-10 RX ORDER — DIPHENHYDRAMINE HYDROCHLORIDE 50 MG/ML
25 INJECTION INTRAMUSCULAR; INTRAVENOUS EVERY 6 HOURS PRN
Status: DISCONTINUED | OUTPATIENT
Start: 2019-12-10 | End: 2019-12-10 | Stop reason: HOSPADM

## 2019-12-10 RX ORDER — METOCLOPRAMIDE HYDROCHLORIDE 5 MG/ML
10 INJECTION INTRAMUSCULAR; INTRAVENOUS EVERY 10 MIN PRN
Status: COMPLETED | OUTPATIENT
Start: 2019-12-10 | End: 2019-12-10

## 2019-12-10 RX ORDER — CEFAZOLIN SODIUM 2 G/50ML
2 SOLUTION INTRAVENOUS
Status: COMPLETED | OUTPATIENT
Start: 2019-12-10 | End: 2019-12-10

## 2019-12-10 RX ORDER — ONDANSETRON 2 MG/ML
INJECTION INTRAMUSCULAR; INTRAVENOUS
Status: DISCONTINUED | OUTPATIENT
Start: 2019-12-10 | End: 2019-12-10

## 2019-12-10 RX ORDER — LIDOCAINE HCL/PF 100 MG/5ML
SYRINGE (ML) INTRAVENOUS
Status: DISCONTINUED | OUTPATIENT
Start: 2019-12-10 | End: 2019-12-10

## 2019-12-10 RX ADMIN — FENTANYL CITRATE 100 MCG: 50 INJECTION, SOLUTION INTRAMUSCULAR; INTRAVENOUS at 10:12

## 2019-12-10 RX ADMIN — SODIUM CHLORIDE, SODIUM LACTATE, POTASSIUM CHLORIDE, AND CALCIUM CHLORIDE: 600; 310; 30; 20 INJECTION, SOLUTION INTRAVENOUS at 11:12

## 2019-12-10 RX ADMIN — HYDROCODONE BITARTRATE AND ACETAMINOPHEN 1 TABLET: 5; 325 TABLET ORAL at 12:12

## 2019-12-10 RX ADMIN — ROBINUL 0.8 MG: 0.2 INJECTION INTRAMUSCULAR; INTRAVENOUS at 11:12

## 2019-12-10 RX ADMIN — SODIUM CHLORIDE, SODIUM LACTATE, POTASSIUM CHLORIDE, AND CALCIUM CHLORIDE: 600; 310; 30; 20 INJECTION, SOLUTION INTRAVENOUS at 10:12

## 2019-12-10 RX ADMIN — EPHEDRINE SULFATE 25 MG: 50 INJECTION INTRAVENOUS at 10:12

## 2019-12-10 RX ADMIN — NEOSTIGMINE METHYLSULFATE 5 MG: 1 INJECTION INTRAVENOUS at 11:12

## 2019-12-10 RX ADMIN — PROPOFOL 100 MG: 10 INJECTION, EMULSION INTRAVENOUS at 10:12

## 2019-12-10 RX ADMIN — CEFAZOLIN SODIUM 2 G: 2 SOLUTION INTRAVENOUS at 10:12

## 2019-12-10 RX ADMIN — ONDANSETRON 4 MG: 2 INJECTION, SOLUTION INTRAMUSCULAR; INTRAVENOUS at 11:12

## 2019-12-10 RX ADMIN — ROCURONIUM BROMIDE 40 MG: 10 INJECTION, SOLUTION INTRAVENOUS at 10:12

## 2019-12-10 RX ADMIN — SODIUM CHLORIDE 100 ML: 9 INJECTION, SOLUTION INTRAVENOUS at 11:12

## 2019-12-10 RX ADMIN — METOCLOPRAMIDE 10 MG: 5 INJECTION, SOLUTION INTRAMUSCULAR; INTRAVENOUS at 01:12

## 2019-12-10 RX ADMIN — HYDROMORPHONE HYDROCHLORIDE 0.2 MG: 2 INJECTION INTRAMUSCULAR; INTRAVENOUS; SUBCUTANEOUS at 12:12

## 2019-12-10 RX ADMIN — LIDOCAINE HYDROCHLORIDE 100 MG: 20 INJECTION, SOLUTION INTRAVENOUS at 10:12

## 2019-12-10 NOTE — ANESTHESIA PREPROCEDURE EVALUATION
12/10/2019  Julianna Montemayor is a 69 y.o., female.    Pre-op Assessment    I have reviewed the Patient Summary Reports.     I have reviewed the Medications.     Review of Systems  Anesthesia Hx:  No problems with previous Anesthesia   Denies Personal Hx of Anesthesia complications.   Hematology/Oncology:     Oncology Normal    -- Anemia:   EENT/Dental:EENT/Dental Normal   Cardiovascular:   Exercise tolerance: good Hypertension ECG has been reviewed.    Pulmonary:  Pulmonary Normal    Renal/:  Renal/ Normal     Hepatic/GI:   Hepatitis (Autoimmune)    Musculoskeletal:  Musculoskeletal Normal    Neurological:  Neurology Normal    Endocrine:  Endocrine Normal    Dermatological:  Skin Normal    Psych:   Psychiatric History anxiety depression          Physical Exam  General:  Well nourished    Airway/Jaw/Neck:  Airway Findings: Mouth Opening: Normal Tongue: Normal  Mallampati: II      Dental:  Dental Findings: In tact   Chest/Lungs:  Chest/Lungs Clear    Heart/Vascular:  Heart Findings: Normal Heart murmur: negative       Mental Status:  Mental Status Findings:  Cooperative, Alert and Oriented         Anesthesia Plan  Type of Anesthesia, risks & benefits discussed:  Anesthesia Type:  general  Patient's Preference:   Intra-op Monitoring Plan: standard ASA monitors  Intra-op Monitoring Plan Comments:   Post Op Pain Control Plan: multimodal analgesia  Post Op Pain Control Plan Comments:   Induction:   IV  Beta Blocker:  Patient is not currently on a Beta-Blocker (No further documentation required).       Informed Consent: Patient understands risks and agrees with Anesthesia plan.  Questions answered. Anesthesia consent signed with patient.  ASA Score: 3     Day of Surgery Review of History & Physical: I have interviewed and examined the patient. I have reviewed the patient's H&P dated:    H&P update referred to the  surgeon.     Anesthesia Plan Notes: Stress Dose Steroids

## 2019-12-10 NOTE — H&P
History & Physical     SUBJECTIVE:      History of Present Illness:  Presents for left inguinal hernia repair.  No changes since clinic.  Patient is a 69 y.o. female referred for left inguinal hernia. Patient reports bulge in her left groin that causes her pain. She denies any change in her bowels.  She recently underwent an ultrasound showing small left inguinal hernia defect containing fat and bowel.         Chief Complaint   Patient presents with    Consult              Review of patient's allergies indicates:   Allergen Reactions    No known drug allergies           Current Medications          Current Outpatient Medications   Medication Sig Dispense Refill    azaTHIOprine (IMURAN) 50 mg Tab Take 1 tablet (50 mg total) by mouth once daily. 90 tablet 5    calcium-vitamin D (OSCAL) 250 (625)-125 mg-unit per tablet Take 1 tablet by mouth once daily.        cholecalciferol, vitamin D3, (VITAMIN D3) 5,000 unit Tab Take 1,000 Units by mouth once daily.         ferrous sulfate 325 (65 FE) MG EC tablet Take 325 mg by mouth 3 (three) times daily with meals.        lisinopril-hydrochlorothiazide (PRINZIDE,ZESTORETIC) 10-12.5 mg per tablet Take 1 tablet by mouth once daily. 90 tablet 1    predniSONE (DELTASONE) 5 MG tablet Take 0.5 tablets (2.5 mg total) by mouth once daily. 30 tablet 3      No current facility-administered medications for this visit.                  Past Medical History:   Diagnosis Date    Allergic rhinitis      Anemia      Anxiety associated with depression      Autoimmune hepatitis      Chest pain syndrome 7/24/2015    DJD (degenerative joint disease), lumbar      Essential hypertension      Hyperlipidemia      Hypertension      Osteoarthritis of both hips      Osteoarthritis of right knee      Osteopenia      Palpitations 7/24/2015    Sacroiliac joint disease      Tubular adenoma of colon      Vitamin D deficiency disease              Past Surgical History:   Procedure  "Laterality Date    APPENDECTOMY        COLONOSCOPY N/A 8/2/2016     Procedure: COLONOSCOPY;  Surgeon: Bran Reeves III, MD;  Location: [facility];  Service: Endoscopy;  Laterality: N/A;    TOTAL ABDOMINAL HYSTERECTOMY        TOTAL KNEE ARTHROPLASTY Right October 2013            Family History   Problem Relation Age of Onset    Colon cancer Mother      Hypertension Mother      Dementia Mother      Stroke Mother      Cataracts Mother      Hypertension Sister      Cancer Sister      Kidney failure Sister           ESRD    Diabetes Sister      Hypertension Sister      No Known Problems Father      Heart failure Brother      Diabetes Daughter      Lupus Other        Social History           Tobacco Use    Smoking status: Never Smoker    Smokeless tobacco: Never Used   Substance Use Topics    Alcohol use: No    Drug use: No         Review of Systems:  Review of Systems   Constitutional: Negative for chills, fatigue, fever and unexpected weight change.   Respiratory: Negative for cough, shortness of breath, wheezing and stridor.    Cardiovascular: Negative for chest pain, palpitations and leg swelling.   Gastrointestinal: Negative for abdominal distention, abdominal pain, constipation, diarrhea, nausea and vomiting.   Genitourinary: Negative for difficulty urinating, dysuria, frequency, hematuria and urgency.   Skin: Negative for color change, pallor, rash and wound.   Hematological: Does not bruise/bleed easily.         OBJECTIVE:      Vital Signs (Most Recent)  Temp: 98.2 °F (36.8 °C) (11/06/19 0933)  Pulse: (!) 59 (11/06/19 0933)  BP: 127/73 (11/06/19 0933)  5' 6" (1.676 m)  64.6 kg (142 lb 6.7 oz)      Physical Exam:  Physical Exam   Constitutional: She is oriented to person, place, and time. She appears well-developed and well-nourished.   HENT:   Head: Normocephalic and atraumatic.   Eyes: EOM are normal.   Neck: Neck supple.   Cardiovascular: Normal rate and regular rhythm. "   Pulmonary/Chest: Effort normal and breath sounds normal.   Abdominal: Soft. Bowel sounds are normal. She exhibits no distension. There is no tenderness. A hernia (Left inguinal) is present.   Neurological: She is alert and oriented to person, place, and time.   Skin: Skin is warm and dry.   Vitals reviewed.        Diagnostic Results:  Ultrasound:       FINDINGS:  There is a small fascial defect with protruding fat and peristalsing bowel in the left groin consistent with small inguinal hernia.  Hernia neck measures 7 mm.  No mass or lymphadenopathy identified.       Impression         Left inguinal hernia as detailed            ASSESSMENT/PLAN:      69-year-old female with left inguinal hernia     PLAN:Plan      Open left inguinal hernia repair with mesh 01/10/2019 (patient wants to wait till after holidays will call if symptoms worsen for repair sooner)  Preop:  CBC, CMP, EKG  Risk of hernia repair includes infection, bleeding, mesh infection, recurrence, need for further procedure and pain or numbness in the groin that may be long-lasting.    All questions answered okay to proceed with surgery

## 2019-12-10 NOTE — TRANSFER OF CARE
"Anesthesia Transfer of Care Note    Patient: Julianna Montemayor    Procedure(s) Performed: Procedure(s) (LRB):  REPAIR, HERNIA, INGUINAL, WITHOUT HISTORY OF PRIOR REPAIR, AGE 5 YEARS OR OLDER (Left)    Patient location: PACU    Anesthesia Type: general    Transport from OR: Transported from OR on room air with adequate spontaneous ventilation    Post pain: adequate analgesia    Post assessment: no apparent anesthetic complications and tolerated procedure well    Post vital signs: stable    Level of consciousness: awake and alert    Nausea/Vomiting: no nausea/vomiting    Complications: none    Transfer of care protocol was followed      Last vitals:   Visit Vitals  BP (!) 163/92   Pulse 71   Temp 36.3 °C (97.3 °F) (Temporal)   Resp 14   Ht 5' 6" (1.676 m)   Wt 63.7 kg (140 lb 6.9 oz)   SpO2 99%   Breastfeeding? No   BMI 22.67 kg/m²     "

## 2019-12-10 NOTE — ANESTHESIA POSTPROCEDURE EVALUATION
Anesthesia Post Evaluation    Patient: Julianna Montemayor    Procedure(s) Performed: Procedure(s) (LRB):  REPAIR, HERNIA, INGUINAL, WITHOUT HISTORY OF PRIOR REPAIR, AGE 5 YEARS OR OLDER (Left)    Final Anesthesia Type: general    Patient location during evaluation: PACU  Patient participation: Yes- Able to Participate  Level of consciousness: awake and alert and awake  Post-procedure vital signs: reviewed and stable  Pain management: adequate  Airway patency: patent  JANE mitigation strategies: Preoperative initiation of continuous positive airway pressure (CPAP) or non-invasive positive pressure ventilation (NIPPV)  PONV status at discharge: No PONV  Anesthetic complications: no                Vitals Value Taken Time   /92 12/10/2019 11:50 AM   Temp 36.3 °C (97.3 °F) 12/10/2019 11:48 AM   Pulse 69 12/10/2019 11:52 AM   Resp 19 12/10/2019 11:52 AM   SpO2 100 % 12/10/2019 11:52 AM   Vitals shown include unvalidated device data.      No case tracking events are documented in the log.      Pain/Lalito Score: No data recorded

## 2019-12-10 NOTE — ANESTHESIA POSTPROCEDURE EVALUATION
Anesthesia Post Evaluation    Patient: Julianna Montemayor    Procedure(s) Performed: Procedure(s) (LRB):  REPAIR, HERNIA, INGUINAL, WITHOUT HISTORY OF PRIOR REPAIR, AGE 5 YEARS OR OLDER (Left)    Final Anesthesia Type: general    Patient location during evaluation: PACU  Patient participation: Yes- Able to Participate  Level of consciousness: awake and alert  Post-procedure vital signs: reviewed and stable  Pain management: adequate  Airway patency: patent    PONV status at discharge: No PONV                Vitals Value Taken Time   /92 12/10/2019 11:49 AM   Temp 36.3 °C (97.3 °F) 12/10/2019 11:48 AM   Pulse 69 12/10/2019 11:52 AM   Resp 19 12/10/2019 11:52 AM   SpO2 100 % 12/10/2019 11:52 AM   Vitals shown include unvalidated device data.      No case tracking events are documented in the log.      Pain/Lalito Score: No data recorded

## 2019-12-11 NOTE — OP NOTE
[facility]  Surgery Department  Operative Note    SUMMARY     Date of Procedure: 12/10/2019     Procedure: Procedure(s) (LRB):  Open left inguinal hernia repair with mesh    Surgeon(s) and Role:     * Tevin Mays MD - Primary    Assisting Surgeon:  TANIA Parrish    Pre-Operative Diagnosis: Left inguinal hernia [K40.90]    Post-Operative Diagnosis: Post-Op Diagnosis Codes:     * Left inguinal hernia [K40.90]    Anesthesia: General    Technical Procedures Used:  Left inguinal hernia repair    Description of the Findings of the Procedure:  Indirect inguinal hernia    Significant Surgical Tasks Conducted by the Assistant(s), if Applicable:  Assistance with surgery/closure    Complications: No    Estimated Blood Loss (EBL):  5 cc           Implants:   Implant Name Type Inv. Item Serial No.  Lot No. LRB No. Used   MESH PARIETEX PROGRIP LEFT - [id number]  MESH PARIETEX PROGRIP LEFT  COVIDIEN [id number] Left 1       Specimens:   Specimen (12h ago, onward)    None                  Condition: Good    Disposition: PACU - hemodynamically stable.    Procedure in detail:  The patient was identified in preoperative holding and brought back to the Operating Room. The patient was placed supine on the operating table and padded appropriately. Monitors were applied and monitored anesthesia care was initiated. Her left groin was shaved with electric clippers   and prepped and draped in the standard sterile surgical fashion. A timeout was   performed and all team members present agreed this was the correct procedure on   the correct side of the correct patient. We also confirmed administration of   appropriate preoperative antibiotics.     We marked out an appropriate left inguinal incision and administered a mix of   lidocaine and Marcaine. After assuring adequate local anesthesia, a 5 cm   oblique skin incision was made. Subcutaneous tissue was divided with Bovie   electrocautery. This dissection was  carried down through Chidi fascia down to   the aponeurosis of the external oblique. The aponeurosis of the external   oblique was incised in line with its fibers, first with a scalpel and then   Metzenbaum scissors, and this incision was extended to the external spermatic   ring. The inguinal canal contents were bluntly encircled, first digitally and   then with a Penrose drain. Weitlaner retractors were placed to facilitate the   dissection. We proceeded to identify a indirect inguinal hernia sac,   which was carefully dissected away from the inguinal canal contents until it   could be reduced into the defect. The round ligament was identified and divided. We then had appropriate borders of the inguinal canal identified and decided to repair the defect with a piece of ProGrip mesh.  Mesh was fixed into place according to manufacture's specifics.  The mesh was secured in a few places using interrupted 0 Vicryl sutures. Medially, the mesh was anchored to the fascia overlying the pubic tubercle. Inferiorly, the mesh was anchored to the shelving edge of the inguinal ligament. Superiorly, the mesh was anchored to the conjoined tendon.The mesh was inspected and noted to lie in appropriate position without   any redundancy or tension. The aponeurosis of   the external oblique was closed with a running 3-0 Vicryl suture. Chidi   fascia was closed with several buried interrupted 3-0 Vicryl sutures and the   skin was closed with a running subcuticular Monocryl suture. A sterile dressing   was applied. The patient was then transported to the Recovery Room in stable   condition. All sponge, instrument and needle counts were correct at the end of   the case. I was present and scrubbed for the entire procedure.

## 2019-12-11 NOTE — BRIEF OP NOTE
[facility] -   Brief Operative Note     SUMMARY     Surgery Date: 12/10/2019     Surgeon(s) and Role:     * Tevin Mays MD - Primary    Assisting Surgeon: None    Pre-op Diagnosis:  Left inguinal hernia [K40.90]    Post-op Diagnosis:  Post-Op Diagnosis Codes:     * Left inguinal hernia [K40.90]    Procedure(s) (LRB):  REPAIR, HERNIA, INGUINAL, WITHOUT HISTORY OF PRIOR REPAIR, AGE 5 YEARS OR OLDER (Left)    Anesthesia: General    Description of the findings of the procedure:  Left inguinal hernia repair    Findings/Key Components:  See op note    Estimated Blood Loss: * No values recorded between 12/10/2019 11:00 AM and 12/10/2019 11:46 AM *         Specimens:   Specimen (12h ago, onward)    None          Discharge Note    SUMMARY     Admit Date: 12/10/2019    Discharge Date and Time: 12/10/2019  5:53 PM    Hospital Course patient underwent left inguinal hernia repair and was discharged postoperatively    Final Diagnosis: Post-Op Diagnosis Codes:     * Left inguinal hernia [K40.90]    Disposition: Home or Self Care    Follow Up/Patient Instructions:     Medications:  Reconciled Home Medications:      Medication List      START taking these medications    docusate sodium 100 MG capsule  Commonly known as:  COLACE  Take 1 capsule (100 mg total) by mouth 2 (two) times daily as needed for Constipation.     HYDROcodone-acetaminophen 5-325 mg per tablet  Commonly known as:  NORCO  Take 1 tablet by mouth every 4 (four) hours as needed for Pain.        CHANGE how you take these medications    predniSONE 5 MG tablet  Commonly known as:  DELTASONE  Take 0.5 tablets (2.5 mg total) by mouth once daily.  What changed:  additional instructions        CONTINUE taking these medications    azaTHIOprine 50 mg Tab  Commonly known as:  IMURAN  Take 1 tablet (50 mg total) by mouth once daily.     calcium-vitamin D 250 (625)-125 mg-unit per tablet  Commonly known as:  OSCAL  Take 1 tablet by mouth once daily.     ferrous  sulfate 325 (65 FE) MG EC tablet  Take 325 mg by mouth once daily.     lisinopril-hydrochlorothiazide 10-12.5 mg per tablet  Commonly known as:  PRINZIDE,ZESTORETIC  Take 1 tablet by mouth once daily.     Vitamin D3 125 mcg (5,000 unit) Tab  Generic drug:  cholecalciferol (vitamin D3)  Take 5,000 Units by mouth once daily.          Discharge Procedure Orders   Diet general     Ice to affected area     Lifting restrictions   Order Comments: No heavy lifting greater than 10 lb or strenuous activity for 6 weeks     Remove dressing in 48 hours     Call MD for:  temperature >100.4     Call MD for:  persistent nausea and vomiting     Call MD for:  severe uncontrolled pain     Call MD for:  difficulty breathing, headache or visual disturbances     Call MD for:  redness, tenderness, or signs of infection (pain, swelling, redness, odor or green/yellow discharge around incision site)     Call MD for:  hives     Call MD for:  persistent dizziness or light-headedness     Call MD for:  extreme fatigue     Activity as tolerated     Shower on day dressing removed (No bath)

## 2019-12-13 VITALS
BODY MASS INDEX: 22.57 KG/M2 | DIASTOLIC BLOOD PRESSURE: 60 MMHG | WEIGHT: 140.44 LBS | TEMPERATURE: 98 F | OXYGEN SATURATION: 99 % | HEART RATE: 54 BPM | SYSTOLIC BLOOD PRESSURE: 125 MMHG | RESPIRATION RATE: 15 BRPM | HEIGHT: 66 IN

## 2019-12-23 ENCOUNTER — OFFICE VISIT (OUTPATIENT)
Dept: SURGERY | Facility: CLINIC | Age: 69
End: 2019-12-23
Payer: MEDICARE

## 2019-12-23 VITALS
WEIGHT: 140.19 LBS | BODY MASS INDEX: 22.53 KG/M2 | TEMPERATURE: 98 F | HEART RATE: 67 BPM | HEIGHT: 66 IN | DIASTOLIC BLOOD PRESSURE: 70 MMHG | SYSTOLIC BLOOD PRESSURE: 95 MMHG

## 2019-12-23 DIAGNOSIS — K40.90 LEFT INGUINAL HERNIA: Primary | ICD-10-CM

## 2019-12-23 PROCEDURE — 99024 PR POST-OP FOLLOW-UP VISIT: ICD-10-PCS | Mod: POP,,, | Performed by: SURGERY

## 2019-12-23 PROCEDURE — 99213 OFFICE O/P EST LOW 20 MIN: CPT | Mod: PBBFAC | Performed by: SURGERY

## 2019-12-23 PROCEDURE — 99999 PR PBB SHADOW E&M-EST. PATIENT-LVL III: CPT | Mod: PBBFAC,,, | Performed by: SURGERY

## 2019-12-23 PROCEDURE — 99999 PR PBB SHADOW E&M-EST. PATIENT-LVL III: ICD-10-PCS | Mod: PBBFAC,,, | Performed by: SURGERY

## 2019-12-23 PROCEDURE — 99024 POSTOP FOLLOW-UP VISIT: CPT | Mod: POP,,, | Performed by: SURGERY

## 2019-12-23 NOTE — PROGRESS NOTES
History & Physical    SUBJECTIVE:     History of Present Illness:  Patient is a 69 y.o. female status post left inguinal hernia repair 12/10/2019 presents for postop.  She is doing well     Initially referred for left inguinal hernia. Patient reports bulge in her left groin that causes her pain. She denies any change in her bowels.  She recently underwent an ultrasound showing small left inguinal hernia defect containing fat and bowel.    Chief Complaint   Patient presents with    Follow-up       Review of patient's allergies indicates:   Allergen Reactions    No known drug allergies        Current Outpatient Medications   Medication Sig Dispense Refill    azaTHIOprine (IMURAN) 50 mg Tab Take 1 tablet (50 mg total) by mouth once daily. 90 tablet 5    calcium-vitamin D (OSCAL) 250 (625)-125 mg-unit per tablet Take 1 tablet by mouth once daily.      cholecalciferol, vitamin D3, (VITAMIN D3) 5,000 unit Tab Take 5,000 Units by mouth once daily.       docusate sodium (COLACE) 100 MG capsule Take 1 capsule (100 mg total) by mouth 2 (two) times daily as needed for Constipation. 30 capsule 0    ferrous sulfate 325 (65 FE) MG EC tablet Take 325 mg by mouth once daily.       HYDROcodone-acetaminophen (NORCO) 5-325 mg per tablet Take 1 tablet by mouth every 4 (four) hours as needed for Pain. 20 tablet 0    lisinopril-hydrochlorothiazide (PRINZIDE,ZESTORETIC) 10-12.5 mg per tablet Take 1 tablet by mouth once daily. 90 tablet 1    predniSONE (DELTASONE) 5 MG tablet Take 0.5 tablets (2.5 mg total) by mouth once daily. (Patient taking differently: Take 2.5 mg by mouth once daily. Take am of surgery) 30 tablet 3     No current facility-administered medications for this visit.        Past Medical History:   Diagnosis Date    Allergic rhinitis     Anemia     Anxiety associated with depression     Autoimmune hepatitis     DJD (degenerative joint disease), lumbar     Encounter for blood transfusion     Essential  "hypertension     Hyperlipidemia     Osteoarthritis of both hips     Osteoarthritis of right knee     Osteopenia     Sacroiliac joint disease     Tubular adenoma of colon     Vitamin D deficiency disease      Past Surgical History:   Procedure Laterality Date    APPENDECTOMY      COLONOSCOPY N/A 8/2/2016    Procedure: COLONOSCOPY;  Surgeon: Bran Reeves III, MD;  Location: [facility];  Service: Endoscopy;  Laterality: N/A;    JOINT REPLACEMENT Right 10/2013    knee    ORIF FINGER FRACTURE Right     right middle finger    TOTAL ABDOMINAL HYSTERECTOMY       Family History   Problem Relation Age of Onset    Colon cancer Mother 89    Hypertension Mother     Dementia Mother     Stroke Mother 70    Cataracts Mother     Hypertension Sister     Cancer Sister     Anesthesia problems Sister         unknown prob. with anesthesia    Kidney failure Sister         ESRD    Diabetes Sister     Hypertension Sister     No Known Problems Father     Heart attack Brother 48    Diabetes Daughter     Lupus Other      Social History     Tobacco Use    Smoking status: Never Smoker    Smokeless tobacco: Never Used   Substance Use Topics    Alcohol use: No    Drug use: No        Review of Systems:  Review of Systems   Constitutional: Negative for chills, fatigue, fever and unexpected weight change.   Respiratory: Negative for cough, shortness of breath, wheezing and stridor.    Cardiovascular: Negative for chest pain, palpitations and leg swelling.   Gastrointestinal: Negative for abdominal distention, abdominal pain, constipation, diarrhea, nausea and vomiting.   Genitourinary: Negative for difficulty urinating, dysuria, frequency, hematuria and urgency.   Skin: Negative for color change, pallor, rash and wound.   Hematological: Does not bruise/bleed easily.       OBJECTIVE:     Vital Signs (Most Recent)  Temp: 97.9 °F (36.6 °C) (12/23/19 0908)  Pulse: 67 (12/23/19 0908)  BP: 95/70 (12/23/19 0908)  5' 6" " (1.676 m)  63.6 kg (140 lb 3.4 oz)     Physical Exam:  Physical Exam   Constitutional: She is oriented to person, place, and time. She appears well-developed and well-nourished.   HENT:   Head: Normocephalic and atraumatic.   Eyes: EOM are normal.   Neck: Neck supple.   Cardiovascular: Normal rate and regular rhythm.   Pulmonary/Chest: Effort normal and breath sounds normal.   Abdominal: Soft. Bowel sounds are normal. She exhibits no distension. There is no tenderness. No hernia.   Left inguinal hernia repair incision healing well no signs of infection   Neurological: She is alert and oriented to person, place, and time.   Skin: Skin is warm and dry.   Vitals reviewed.      Diagnostic Results:  Ultrasound:  FINDINGS:  There is a small fascial defect with protruding fat and peristalsing bowel in the left groin consistent with small inguinal hernia.  Hernia neck measures 7 mm.  No mass or lymphadenopathy identified.      Impression       Left inguinal hernia as detailed         ASSESSMENT/PLAN:     69-year-old female status post left inguinal hernia repair    PLAN:Plan     Healing well  Continue light duty x1 month  Follow-up in 1 month

## 2020-01-03 DIAGNOSIS — Z12.39 BREAST CANCER SCREENING: ICD-10-CM

## 2020-01-20 ENCOUNTER — PATIENT OUTREACH (OUTPATIENT)
Dept: ADMINISTRATIVE | Facility: HOSPITAL | Age: 70
End: 2020-01-20

## 2020-01-20 ENCOUNTER — TELEPHONE (OUTPATIENT)
Dept: FAMILY MEDICINE | Facility: CLINIC | Age: 70
End: 2020-01-20

## 2020-01-20 NOTE — TELEPHONE ENCOUNTER
Last annual: 6/27/19  Next annual: 6/29/20  Patient said sometimes it's uncomfortable when she urinates. No pain noted. She says it it feels 'low', and wants to be seen for an evaluation.  She already has an appt in the system for 2/7. She said she can wait to be seen then.   Annual exams scheduled for her and her .     ----- Message from Malina Gerardo sent at 1/20/2020 11:48 AM CST -----  Contact: self [phone]  .Type:  Sooner Apoointment Request    Caller is requesting a sooner appointment.  Caller declined first available appointment listed below.  Caller will not accept being placed on the waitlist and is requesting a message be sent to doctor.  Name of Caller:Julianna Montemayor  When is the first available appointment?01/27/2020  Symptoms:bladder problems  Would the patient rather a call back or a response via [facility]? Call back  Best Call Back Number:[phone]  Additional Information:

## 2020-01-22 ENCOUNTER — OFFICE VISIT (OUTPATIENT)
Dept: SURGERY | Facility: CLINIC | Age: 70
End: 2020-01-22
Payer: MEDICARE

## 2020-01-22 VITALS
TEMPERATURE: 98 F | DIASTOLIC BLOOD PRESSURE: 84 MMHG | SYSTOLIC BLOOD PRESSURE: 136 MMHG | BODY MASS INDEX: 23.13 KG/M2 | WEIGHT: 143.94 LBS | HEIGHT: 66 IN | HEART RATE: 60 BPM

## 2020-01-22 DIAGNOSIS — Z98.890 STATUS POST INGUINAL HERNIA REPAIR: Primary | ICD-10-CM

## 2020-01-22 DIAGNOSIS — Z87.19 STATUS POST INGUINAL HERNIA REPAIR: Primary | ICD-10-CM

## 2020-01-22 PROCEDURE — 99024 POSTOP FOLLOW-UP VISIT: CPT | Mod: POP,,, | Performed by: SURGERY

## 2020-01-22 PROCEDURE — 99213 OFFICE O/P EST LOW 20 MIN: CPT | Mod: PBBFAC | Performed by: SURGERY

## 2020-01-22 PROCEDURE — 99999 PR PBB SHADOW E&M-EST. PATIENT-LVL III: CPT | Mod: PBBFAC,,, | Performed by: SURGERY

## 2020-01-22 PROCEDURE — 99999 PR PBB SHADOW E&M-EST. PATIENT-LVL III: ICD-10-PCS | Mod: PBBFAC,,, | Performed by: SURGERY

## 2020-01-22 PROCEDURE — 99024 PR POST-OP FOLLOW-UP VISIT: ICD-10-PCS | Mod: POP,,, | Performed by: SURGERY

## 2020-01-24 NOTE — PROGRESS NOTES
History & Physical    SUBJECTIVE:     History of Present Illness:  Patient is a 69 y.o. female status post left inguinal hernia repair 12/10/2019 presents for follow-up.  Denies any pain reports incision has healed well    Initially referred for left inguinal hernia. Patient reports bulge in her left groin that causes her pain. She denies any change in her bowels.  She recently underwent an ultrasound showing small left inguinal hernia defect containing fat and bowel.    Chief Complaint   Patient presents with    Post-op Evaluation       Review of patient's allergies indicates:   Allergen Reactions    No known drug allergies        Current Outpatient Medications   Medication Sig Dispense Refill    azaTHIOprine (IMURAN) 50 mg Tab Take 1 tablet (50 mg total) by mouth once daily. 90 tablet 5    calcium-vitamin D (OSCAL) 250 (625)-125 mg-unit per tablet Take 1 tablet by mouth once daily.      cholecalciferol, vitamin D3, (VITAMIN D3) 5,000 unit Tab Take 5,000 Units by mouth once daily.       ferrous sulfate 325 (65 FE) MG EC tablet Take 325 mg by mouth once daily.       lisinopril-hydrochlorothiazide (PRINZIDE,ZESTORETIC) 10-12.5 mg per tablet Take 1 tablet by mouth once daily. 90 tablet 1    predniSONE (DELTASONE) 5 MG tablet Take 0.5 tablets (2.5 mg total) by mouth once daily. (Patient taking differently: Take 2.5 mg by mouth once daily. Take am of surgery) 30 tablet 3    HYDROcodone-acetaminophen (NORCO) 5-325 mg per tablet Take 1 tablet by mouth every 4 (four) hours as needed for Pain. (Patient not taking: Reported on 1/22/2020) 20 tablet 0     No current facility-administered medications for this visit.        Past Medical History:   Diagnosis Date    Allergic rhinitis     Anemia     Anxiety associated with depression     Autoimmune hepatitis     DJD (degenerative joint disease), lumbar     Encounter for blood transfusion     Essential hypertension     Hyperlipidemia     Osteoarthritis of both  "hips     Osteoarthritis of right knee     Osteopenia     Sacroiliac joint disease     Tubular adenoma of colon     Vitamin D deficiency disease      Past Surgical History:   Procedure Laterality Date    APPENDECTOMY      COLONOSCOPY N/A 8/2/2016    Procedure: COLONOSCOPY;  Surgeon: Bran Reeves III, MD;  Location: [facility];  Service: Endoscopy;  Laterality: N/A;    JOINT REPLACEMENT Right 10/2013    knee    ORIF FINGER FRACTURE Right     right middle finger    TOTAL ABDOMINAL HYSTERECTOMY       Family History   Problem Relation Age of Onset    Colon cancer Mother 89    Hypertension Mother     Dementia Mother     Stroke Mother 70    Cataracts Mother     Hypertension Sister     Cancer Sister     Anesthesia problems Sister         unknown prob. with anesthesia    Kidney failure Sister         ESRD    Diabetes Sister     Hypertension Sister     No Known Problems Father     Heart attack Brother 48    Diabetes Daughter     Lupus Other      Social History     Tobacco Use    Smoking status: Never Smoker    Smokeless tobacco: Never Used   Substance Use Topics    Alcohol use: No    Drug use: No        Review of Systems:  Review of Systems   Constitutional: Negative for chills, fatigue, fever and unexpected weight change.   Respiratory: Negative for cough, shortness of breath, wheezing and stridor.    Cardiovascular: Negative for chest pain, palpitations and leg swelling.   Gastrointestinal: Negative for abdominal distention, abdominal pain, constipation, diarrhea, nausea and vomiting.   Genitourinary: Negative for difficulty urinating, dysuria, frequency, hematuria and urgency.   Skin: Negative for color change, pallor, rash and wound.   Hematological: Does not bruise/bleed easily.       OBJECTIVE:     Vital Signs (Most Recent)  Temp: 97.8 °F (36.6 °C) (01/22/20 0942)  Pulse: 60 (01/22/20 0942)  BP: 136/84 (01/22/20 0942)  5' 6" (1.676 m)  65.3 kg (143 lb 15.4 oz)     Physical Exam:  Physical " Exam   Constitutional: She is oriented to person, place, and time. She appears well-developed and well-nourished.   HENT:   Head: Normocephalic and atraumatic.   Eyes: EOM are normal.   Neck: Neck supple.   Cardiovascular: Normal rate and regular rhythm.   Pulmonary/Chest: Effort normal and breath sounds normal.   Abdominal: Soft. Bowel sounds are normal. She exhibits no distension. There is no tenderness. No hernia.   Left inguinal hernia repair incision well-healed   Neurological: She is alert and oriented to person, place, and time.   Skin: Skin is warm and dry.   Vitals reviewed.      Diagnostic Results:  Ultrasound:  FINDINGS:  There is a small fascial defect with protruding fat and peristalsing bowel in the left groin consistent with small inguinal hernia.  Hernia neck measures 7 mm.  No mass or lymphadenopathy identified.      Impression       Left inguinal hernia as detailed         ASSESSMENT/PLAN:     69-year-old female status post left inguinal hernia repair    PLAN:Plan     Well-healed  Okay to resume regular activity  Follow-up p.r.n.

## 2020-02-10 ENCOUNTER — OFFICE VISIT (OUTPATIENT)
Dept: GASTROENTEROLOGY | Facility: CLINIC | Age: 70
End: 2020-02-10
Payer: MEDICARE

## 2020-02-10 VITALS
BODY MASS INDEX: 22.82 KG/M2 | DIASTOLIC BLOOD PRESSURE: 78 MMHG | HEIGHT: 66 IN | HEART RATE: 74 BPM | SYSTOLIC BLOOD PRESSURE: 126 MMHG | WEIGHT: 142 LBS

## 2020-02-10 DIAGNOSIS — Z86.010 HX OF ADENOMATOUS COLONIC POLYPS: ICD-10-CM

## 2020-02-10 DIAGNOSIS — K75.4 AUTOIMMUNE HEPATITIS TREATED WITH STEROIDS: Primary | ICD-10-CM

## 2020-02-10 PROCEDURE — 99999 PR PBB SHADOW E&M-EST. PATIENT-LVL III: ICD-10-PCS | Mod: PBBFAC,,, | Performed by: INTERNAL MEDICINE

## 2020-02-10 PROCEDURE — 99213 PR OFFICE/OUTPT VISIT, EST, LEVL III, 20-29 MIN: ICD-10-PCS | Mod: S$PBB,,, | Performed by: INTERNAL MEDICINE

## 2020-02-10 PROCEDURE — 99213 OFFICE O/P EST LOW 20 MIN: CPT | Mod: PBBFAC | Performed by: INTERNAL MEDICINE

## 2020-02-10 PROCEDURE — 99213 OFFICE O/P EST LOW 20 MIN: CPT | Mod: S$PBB,,, | Performed by: INTERNAL MEDICINE

## 2020-02-10 PROCEDURE — 99999 PR PBB SHADOW E&M-EST. PATIENT-LVL III: CPT | Mod: PBBFAC,,, | Performed by: INTERNAL MEDICINE

## 2020-02-10 NOTE — PROGRESS NOTES
Subjective:       Patient ID: Julianna Montemayor is a 69 y.o. female.    Chief Complaint: Follow-up    Patient with history of auto-immune Hepatitis, is here for a follow-up. She has recently had a hernia repair, and was off her prednisone for a little over a week. She is now back on therapy. Her last set of labs was done in November before she went into the hospital for Surgery which took place in December. She is recovering nicely. She is on 2.5 mg prednisone daily and 50 mg Imuran daily. Attempts at D/C of steroids has always resulted in flare of her disease. Her numbers were fine at last assessment.      Review of Systems   Constitutional: Negative for activity change, appetite change, chills, diaphoresis, fatigue, fever and unexpected weight change.   HENT: Positive for hearing loss. Negative for congestion, ear discharge, ear pain, nosebleeds, postnasal drip and tinnitus.         Congestion in both ears   Eyes: Negative for photophobia and visual disturbance.   Respiratory: Negative for apnea, cough, choking, chest tightness, shortness of breath and wheezing.    Cardiovascular: Negative for chest pain, palpitations and leg swelling.   Gastrointestinal: Negative for abdominal distention, abdominal pain, anal bleeding, blood in stool, constipation, diarrhea, nausea, rectal pain and vomiting.   Genitourinary: Negative for difficulty urinating, dyspareunia, dysuria, flank pain, frequency, hematuria, menstrual problem, pelvic pain, urgency, vaginal bleeding and vaginal discharge.   Musculoskeletal: Negative for arthralgias, back pain, gait problem, joint swelling, myalgias and neck stiffness.   Skin: Negative for pallor and rash.   Neurological: Negative for dizziness, tremors, seizures, syncope, speech difficulty, weakness, numbness and headaches.   Hematological: Negative for adenopathy.   Psychiatric/Behavioral: Negative for agitation, confusion, hallucinations, sleep disturbance and suicidal ideas.        Objective:      Physical Exam   Constitutional: She is oriented to person, place, and time. She appears well-developed and well-nourished.   HENT:   Head: Normocephalic and atraumatic.   Bilateral turbinate congestion   Eyes: Pupils are equal, round, and reactive to light. Conjunctivae and EOM are normal. Right eye exhibits no discharge. Left eye exhibits no discharge. No scleral icterus.   Neck: Normal range of motion. Neck supple. No JVD present. No thyromegaly present.   Cardiovascular: Normal rate, regular rhythm, normal heart sounds and intact distal pulses. Exam reveals no gallop and no friction rub.   No murmur heard.  Pulmonary/Chest: Effort normal and breath sounds normal. No respiratory distress. She has no wheezes. She has no rales. She exhibits no tenderness.   Abdominal: Soft. Bowel sounds are normal. She exhibits no distension and no mass. There is no tenderness. There is no rebound and no guarding.   Musculoskeletal: Normal range of motion. She exhibits no edema.   Lymphadenopathy:     She has no cervical adenopathy.   Neurological: She is alert and oriented to person, place, and time. She has normal reflexes. She exhibits normal muscle tone. Coordination normal.   Skin: Skin is warm and dry. No rash noted. No erythema. No pallor.   Psychiatric: She has a normal mood and affect. Her behavior is normal. Judgment and thought content normal.   Vitals reviewed.      Assessment:   No diagnosis found.   Auto-immune Hepatitis  Hx of Colon Polyp          Repeat due in August 2021  Plan:   Repeat labs

## 2020-02-11 ENCOUNTER — LAB VISIT (OUTPATIENT)
Dept: LAB | Facility: HOSPITAL | Age: 70
End: 2020-02-11
Attending: INTERNAL MEDICINE
Payer: MEDICARE

## 2020-02-11 DIAGNOSIS — K75.4 AUTOIMMUNE HEPATITIS TREATED WITH STEROIDS: ICD-10-CM

## 2020-02-11 LAB
ALBUMIN SERPL BCP-MCNC: 3.7 G/DL (ref 3.5–5.2)
ALP SERPL-CCNC: 43 U/L (ref 55–135)
ALT SERPL W/O P-5'-P-CCNC: 8 U/L (ref 10–44)
ANION GAP SERPL CALC-SCNC: 9 MMOL/L (ref 8–16)
AST SERPL-CCNC: 17 U/L (ref 10–40)
BASOPHILS # BLD AUTO: 0.03 K/UL (ref 0–0.2)
BASOPHILS NFR BLD: 0.7 % (ref 0–1.9)
BILIRUB DIRECT SERPL-MCNC: 0.3 MG/DL (ref 0.1–0.3)
BILIRUB SERPL-MCNC: 0.7 MG/DL (ref 0.1–1)
BUN SERPL-MCNC: 15 MG/DL (ref 8–23)
CALCIUM SERPL-MCNC: 9.6 MG/DL (ref 8.7–10.5)
CHLORIDE SERPL-SCNC: 105 MMOL/L (ref 95–110)
CO2 SERPL-SCNC: 27 MMOL/L (ref 23–29)
CREAT SERPL-MCNC: 1 MG/DL (ref 0.5–1.4)
DIFFERENTIAL METHOD: ABNORMAL
EOSINOPHIL # BLD AUTO: 0.1 K/UL (ref 0–0.5)
EOSINOPHIL NFR BLD: 1.7 % (ref 0–8)
ERYTHROCYTE [DISTWIDTH] IN BLOOD BY AUTOMATED COUNT: 13.4 % (ref 11.5–14.5)
EST. GFR  (AFRICAN AMERICAN): >60 ML/MIN/1.73 M^2
EST. GFR  (NON AFRICAN AMERICAN): 56.8 ML/MIN/1.73 M^2
GLUCOSE SERPL-MCNC: 62 MG/DL (ref 70–110)
HCT VFR BLD AUTO: 39.2 % (ref 37–48.5)
HGB BLD-MCNC: 11.2 G/DL (ref 12–16)
IMM GRANULOCYTES # BLD AUTO: 0.01 K/UL (ref 0–0.04)
IMM GRANULOCYTES NFR BLD AUTO: 0.2 % (ref 0–0.5)
LYMPHOCYTES # BLD AUTO: 1.7 K/UL (ref 1–4.8)
LYMPHOCYTES NFR BLD: 36.9 % (ref 18–48)
MCH RBC QN AUTO: 28.8 PG (ref 27–31)
MCHC RBC AUTO-ENTMCNC: 28.6 G/DL (ref 32–36)
MCV RBC AUTO: 101 FL (ref 82–98)
MONOCYTES # BLD AUTO: 0.5 K/UL (ref 0.3–1)
MONOCYTES NFR BLD: 11.1 % (ref 4–15)
NEUTROPHILS # BLD AUTO: 2.3 K/UL (ref 1.8–7.7)
NEUTROPHILS NFR BLD: 49.4 % (ref 38–73)
NRBC BLD-RTO: 0 /100 WBC
PLATELET # BLD AUTO: 207 K/UL (ref 150–350)
PMV BLD AUTO: 10.3 FL (ref 9.2–12.9)
POTASSIUM SERPL-SCNC: 4.3 MMOL/L (ref 3.5–5.1)
PROT SERPL-MCNC: 7.6 G/DL (ref 6–8.4)
RBC # BLD AUTO: 3.89 M/UL (ref 4–5.4)
SODIUM SERPL-SCNC: 141 MMOL/L (ref 136–145)
WBC # BLD AUTO: 4.58 K/UL (ref 3.9–12.7)

## 2020-02-11 PROCEDURE — 85025 COMPLETE CBC W/AUTO DIFF WBC: CPT

## 2020-02-11 PROCEDURE — 80048 BASIC METABOLIC PNL TOTAL CA: CPT

## 2020-02-11 PROCEDURE — 36415 COLL VENOUS BLD VENIPUNCTURE: CPT | Mod: PO

## 2020-02-11 PROCEDURE — 80076 HEPATIC FUNCTION PANEL: CPT

## 2020-02-11 RX ORDER — LISINOPRIL AND HYDROCHLOROTHIAZIDE 10; 12.5 MG/1; MG/1
TABLET ORAL
Qty: 90 TABLET | Refills: 1 | Status: SHIPPED | OUTPATIENT
Start: 2020-02-11 | End: 2020-08-10

## 2020-02-20 RX ORDER — PREDNISONE 5 MG/1
2.5 TABLET ORAL DAILY
Qty: 30 TABLET | Refills: 3 | Status: SHIPPED | OUTPATIENT
Start: 2020-02-20 | End: 2020-10-03

## 2020-03-09 ENCOUNTER — OFFICE VISIT (OUTPATIENT)
Dept: FAMILY MEDICINE | Facility: CLINIC | Age: 70
End: 2020-03-09
Payer: MEDICARE

## 2020-03-09 VITALS
DIASTOLIC BLOOD PRESSURE: 70 MMHG | WEIGHT: 140.88 LBS | HEIGHT: 66 IN | HEART RATE: 93 BPM | TEMPERATURE: 99 F | BODY MASS INDEX: 22.64 KG/M2 | OXYGEN SATURATION: 96 % | SYSTOLIC BLOOD PRESSURE: 110 MMHG

## 2020-03-09 DIAGNOSIS — M54.9 UPPER BACK PAIN: ICD-10-CM

## 2020-03-09 DIAGNOSIS — M54.2 NECK PAIN: ICD-10-CM

## 2020-03-09 DIAGNOSIS — J01.90 ACUTE RHINOSINUSITIS: Primary | ICD-10-CM

## 2020-03-09 PROCEDURE — 99214 PR OFFICE/OUTPT VISIT, EST, LEVL IV, 30-39 MIN: ICD-10-PCS | Mod: 25,S$PBB,, | Performed by: FAMILY MEDICINE

## 2020-03-09 PROCEDURE — 99214 OFFICE O/P EST MOD 30 MIN: CPT | Mod: 25,S$PBB,, | Performed by: FAMILY MEDICINE

## 2020-03-09 PROCEDURE — 99999 PR PBB SHADOW E&M-EST. PATIENT-LVL III: ICD-10-PCS | Mod: PBBFAC,,, | Performed by: FAMILY MEDICINE

## 2020-03-09 PROCEDURE — 99999 PR PBB SHADOW E&M-EST. PATIENT-LVL III: CPT | Mod: PBBFAC,,, | Performed by: FAMILY MEDICINE

## 2020-03-09 PROCEDURE — 99213 OFFICE O/P EST LOW 20 MIN: CPT | Mod: PBBFAC,PO,25 | Performed by: FAMILY MEDICINE

## 2020-03-09 PROCEDURE — 96372 THER/PROPH/DIAG INJ SC/IM: CPT | Mod: PBBFAC,PO

## 2020-03-09 RX ORDER — BETAMETHASONE SODIUM PHOSPHATE AND BETAMETHASONE ACETATE 3; 3 MG/ML; MG/ML
9 INJECTION, SUSPENSION INTRA-ARTICULAR; INTRALESIONAL; INTRAMUSCULAR; SOFT TISSUE
Status: COMPLETED | OUTPATIENT
Start: 2020-03-09 | End: 2020-03-09

## 2020-03-09 RX ADMIN — BETAMETHASONE ACETATE AND BETAMETHASONE SODIUM PHOSPHATE 9 MG: 3; 3 INJECTION, SUSPENSION INTRA-ARTICULAR; INTRALESIONAL; INTRAMUSCULAR; SOFT TISSUE at 03:03

## 2020-03-09 NOTE — PATIENT INSTRUCTIONS
IBUPROFEN 200 MG TAKE 2-3 TABLETS 3 TIMES DAILY WITH FOOD FOR 5-7 DAYS FOR NECK AND SHOULDER PAIN.

## 2020-03-09 NOTE — PROGRESS NOTES
CHIEF COMPLAINT:  This is a 69-year-old female complaining of respiratory illness and neck pain.    SUBJECTIVE:  Patient complains of one-week history of upper respiratory symptoms including runny nose, stuffy nose and cough productive of thick yellow-green mucus.  Patient complains of neck pain which is worse when she rotates her head.  Pain extends from neck into shoulders and upper back.  Patient has been taking Robitussin DM for cough.  She denies sore throat, ear pain, chest congestion, shortness of breath or wheezing.  She has increase fluid intake.  She denies ill contacts.  Patient received influenza vaccine.  She denies numbness, tingling or weakness in upper extremities.  She denies joint swelling, redness or warmth.  Patient takes prednisone 2.5 mg daily for autoimmune hepatitis.      ROS:  GENERAL: Patient denies fever, chills, night sweats. Patient denies weight loss. Patient denies anorexia, fatigue, weakness or swollen glands.  SKIN: Patient denies rash.  HEENT: Patient denies sore throat, ear pain, hearing loss, visual disturbance, eye irritation or discharge. Positive for nasal congestion runny nose.  LUNGS: Patient denies wheeze or hemoptysis.  Positive for cough.  CARDIOVASCULAR: Patient denies chest pain, shortness of breath, palpitations, syncope or lower extremity edema.  GI: Patient denies abdominal pain, nausea, vomiting, diarrhea, constipation, blood in stool or melena.  GENITOURINARY: Patient denies dysuria, frequency, hematuria, nocturia, urgency or incontinence.  MUSCULOSKELETAL: Patient denies joint swelling, redness or warmth. Positive for joint pain. Positive for neck and upper back pain.  NEUROLOGIC: Patient denies headache, vertigo, paresthesias, weakness in limb, or abnormality of gait.     OBJECTIVE:   GENERAL: Well-developed well-nourished black female alert and oriented x3, in no acute distress. Memory, judgment and cognition without deficit.  No audible wheezing.  SKIN: Clear  without rash. Normal color and tone.   HEENT: Eyes: Clear conjunctivae.  No scleral icterus.  Ears: Clear TMs. Clear canals. Nose: Moderate nasal congestion. Pharynx: Without injection or exudates.  NECK: Supple, normal range of motion.  No spinal or paraspinous muscle tenderness. No palpable nodes or masses. No JVD.  LUNGS: Clear to auscultation. Normal respiratory effort.  CARDIOVASCULAR: Regular rhythm, normal S1, S2 without murmur, gallop or rub.  BACK: No CVA or spinal tenderness. Normal range of motion in flexion and extension.  EXTREMITIES: Without cyanosis, clubbing or edema. Distal pulses 2+ and equal. Normal range of motion in lower extremities. No joint effusion, erythema or warmth.  NEUROLOGIC:  Motor strength equal bilaterally. Sensation normal to touch. Deep tendon reflexes 2+ and equal. Gait without abnormality. No tremor.     ASSESSMENT:  1. Acute rhinosinusitis    2. Neck pain    3. Upper back pain      PLAN:   1.  Celestone 9 mg IM.  2.  Keep well hydrated.  3.  Continue Robitussin DM as needed.  4.  Apply moist heat and/or ice q.i.d. for 15-20 minutes.  5.  Ibuprofen 400-600 mg 3 times daily with food as needed for neck and back pain.  6.  Follow up if no improvement or worsening symptoms.    This note is generated with speech recognition software and is subject to transcription error and sound alike phrases that may be missed by proofreading.

## 2020-06-11 ENCOUNTER — OFFICE VISIT (OUTPATIENT)
Dept: FAMILY MEDICINE | Facility: CLINIC | Age: 70
End: 2020-06-11
Payer: MEDICARE

## 2020-06-11 VITALS
TEMPERATURE: 98 F | SYSTOLIC BLOOD PRESSURE: 136 MMHG | WEIGHT: 144.94 LBS | RESPIRATION RATE: 20 BRPM | BODY MASS INDEX: 23.29 KG/M2 | HEART RATE: 60 BPM | HEIGHT: 66 IN | DIASTOLIC BLOOD PRESSURE: 70 MMHG | OXYGEN SATURATION: 99 %

## 2020-06-11 DIAGNOSIS — E55.9 VITAMIN D DEFICIENCY DISEASE: ICD-10-CM

## 2020-06-11 DIAGNOSIS — F41.8 ANXIETY ASSOCIATED WITH DEPRESSION: ICD-10-CM

## 2020-06-11 DIAGNOSIS — M85.80 OSTEOPENIA, UNSPECIFIED LOCATION: ICD-10-CM

## 2020-06-11 DIAGNOSIS — D64.9 ANEMIA, UNSPECIFIED TYPE: ICD-10-CM

## 2020-06-11 DIAGNOSIS — K75.4 AUTOIMMUNE HEPATITIS: Chronic | ICD-10-CM

## 2020-06-11 DIAGNOSIS — E78.5 HYPERLIPIDEMIA, UNSPECIFIED HYPERLIPIDEMIA TYPE: Chronic | ICD-10-CM

## 2020-06-11 DIAGNOSIS — I10 ESSENTIAL HYPERTENSION: Chronic | ICD-10-CM

## 2020-06-11 DIAGNOSIS — Z00.00 ENCOUNTER FOR PREVENTIVE HEALTH EXAMINATION: Primary | ICD-10-CM

## 2020-06-11 PROCEDURE — G0439 PR MEDICARE ANNUAL WELLNESS SUBSEQUENT VISIT: ICD-10-PCS | Mod: S$GLB,,, | Performed by: NURSE PRACTITIONER

## 2020-06-11 PROCEDURE — 99214 OFFICE O/P EST MOD 30 MIN: CPT | Mod: PBBFAC,PO | Performed by: NURSE PRACTITIONER

## 2020-06-11 PROCEDURE — 99999 PR PBB SHADOW E&M-EST. PATIENT-LVL IV: CPT | Mod: PBBFAC,,, | Performed by: NURSE PRACTITIONER

## 2020-06-11 PROCEDURE — G0439 PPPS, SUBSEQ VISIT: HCPCS | Mod: S$GLB,,, | Performed by: NURSE PRACTITIONER

## 2020-06-11 PROCEDURE — 99999 PR PBB SHADOW E&M-EST. PATIENT-LVL IV: ICD-10-PCS | Mod: PBBFAC,,, | Performed by: NURSE PRACTITIONER

## 2020-06-11 NOTE — PATIENT INSTRUCTIONS
Counseling and Referral of Other Preventative  (Italic type indicates deductible and co-insurance are waived)    Patient Name: Julianna Montemayor  Today's Date: 6/11/2020    Health Maintenance       Date Due Completion Date    Mammogram 12/22/2017 12/22/2015    Shingles Vaccine (1 of 2) 08/07/2020 (Originally 9/3/1998) ---    Colonoscopy 08/02/2021 8/2/2016    TETANUS VACCINE 04/27/2022 4/27/2012    Override on 4/27/2012: Done    DEXA SCAN 08/03/2023 8/3/2018    Override on 5/8/2012: Done    Override on 11/6/2009: Done    Lipid Panel 08/08/2024 8/8/2019        No orders of the defined types were placed in this encounter.    The following information is provided to all patients.  This information is to help you find resources for any of the problems found today that may be affecting your health:                Living healthy guide: www.[facility].[place]      Understanding Diabetes: www.diabetes.org      Eating healthy: www.cdc.gov/healthyweight      CDC home safety checklist: www.cdc.gov/steadi/patient.html      Agency on Aging: www.goea.[place]      Alcoholics anonymous (AA): www.aa.org      Physical Activity: www.romy.nih.gov/zk7frwt      Tobacco use: www.quitwithusla.org     Counseling and Referral of Other Preventative  (Italic type indicates deductible and co-insurance are waived)    Patient Name: Julianna Montemayor  Today's Date: 6/11/2020    Health Maintenance       Date Due Completion Date    Mammogram 12/22/2017 12/22/2015    Shingles Vaccine (1 of 2) 08/07/2020 (Originally 9/3/1998) ---    Colonoscopy 08/02/2021 8/2/2016    TETANUS VACCINE 04/27/2022 4/27/2012    Override on 4/27/2012: Done    DEXA SCAN 08/03/2023 8/3/2018    Override on 5/8/2012: Done    Override on 11/6/2009: Done    Lipid Panel 08/08/2024 8/8/2019        No orders of the defined types were placed in this encounter.    The following information is provided to all patients.  This information is to help you find resources for any of the  problems found today that may be affecting your health:                Living healthy guide: www.[facility].[place].[facility]      Understanding Diabetes: www.diabetes.org      Eating healthy: www.cdc.gov/healthyweight      CDC home safety checklist: www.cdc.gov/steadi/patient.html      Agency on Aging: www.goea.[place]      Alcoholics anonymous (AA): www.aa.org      Physical Activity: www.romy.nih.gov/ij7gjoh      Tobacco use: www.quitwithusla.org

## 2020-06-11 NOTE — Clinical Note
Your patient was seen today for a HRA visit. I have included a copy of my visit note, please review the note and feel free to contact me with any questions. Thank you for allowing me to participate in the care of your patients. Tianna Hilario NP

## 2020-06-11 NOTE — PROGRESS NOTES
"Julianna Montemayor presented for a  Medicare AWV and comprehensive Health Risk Assessment today. The following components were reviewed and updated:  Patient accompanied by   , who was present throughout the visit and aided in information gathering.        · Medical history  · Family History  · Social history  · Allergies and Current Medications  · Health Risk Assessment  · Health Maintenance  · Care Team     ** See Completed Assessments for Annual Wellness Visit within the encounter summary.**       The following assessments were completed:  · Living Situation  · CAGE  · Depression Screening  · Timed Get Up and Go  · Whisper Test  · Cognitive Function Screening  · Nutrition Screening  · ADL Screening  · PAQ Screening    Vitals:    06/11/20 1140   BP: 136/70   Pulse: 60   Resp: 20   Temp: 98.3 °F (36.8 °C)   SpO2: 99%   Weight: 65.8 kg (144 lb 15.2 oz)   Height: 5' 6" (1.676 m)     Body mass index is 23.4 kg/m².  Physical Exam   Constitutional: She appears well-developed.   HENT:   Head: Normocephalic and atraumatic.   Eyes: Pupils are equal, round, and reactive to light.   Neck: Carotid bruit is not present.   Cardiovascular: Normal rate, regular rhythm, normal heart sounds, intact distal pulses and normal pulses. Exam reveals no gallop.   No murmur heard.  Pulmonary/Chest: Effort normal and breath sounds normal.   Abdominal: Soft. Normal appearance and bowel sounds are normal. She exhibits no distension. There is no tenderness.   Musculoskeletal: Normal range of motion. She exhibits no edema or tenderness.   Neurological: She is alert. She exhibits normal muscle tone. Gait normal.   Skin: Skin is warm, dry and intact.   Psychiatric: She has a normal mood and affect. Her speech is normal and behavior is normal. Judgment and thought content normal. Cognition and memory are normal.   Nursing note and vitals reviewed.        Diagnoses and health risks identified today and associated recommendations/orders:    1. " Encounter for preventive health examination  Completed    2. Autoimmune hepatitis  Chronic and Stable on Prednisone and Imuran . Continue current treatment plan as previously prescribed with your gastroenterologi    3. Essential hypertension  Chronic and Stable on Zesoteric. Continue current treatment plan as previously prescribed with your PCP    4. Hyperlipidemia, unspecified hyperlipidemia type  Chronic and Stable. Continue current treatment plan as previously prescribed with your PCP    5. Osteopenia, unspecified location  DEXA 8/ 2018 - due repeat in 5 years   Chronic and Stable on vitamin D and calicum  Continue current treatment plan as previously prescribed with your PCP    6. Vitamin D deficiency disease  Chronic and Stable on vitamin D and calicum  Continue current treatment plan as previously prescribed with your PCP    7. Anxiety associated with depression  Stable- states she has her moments but she andres . Denies the need for counseling/treatment. Followed by PC     8. Anemia, unspecified type  Chronic and Stable on ferrous.. Continue current treatment plan as previously prescribed with your PCP and GI     DECLINE MAMMOGRAM     I offered to discuss end of life issues, including information on how to make advance directives that the patient could use to name someone who would make medical decisions on their behalf if they became too ill to make themselves.  _X_Patient is interested, I provided paper work and offered to discuss.    Provided Julianna with a 5-10 year written screening schedule and personal prevention plan. Recommendations were developed using the USPSTF age appropriate recommendations. Education, counseling, and referrals were provided as needed. After Visit Summary printed and given to patient which includes a list of additional screenings\tests needed.    Follow up in about 1 year (around 6/11/2021), or annual wellness.    Tianna Hilario NP

## 2020-06-15 ENCOUNTER — PATIENT OUTREACH (OUTPATIENT)
Dept: ADMINISTRATIVE | Facility: HOSPITAL | Age: 70
End: 2020-06-15

## 2020-06-15 NOTE — PROGRESS NOTES
PreVisit Chart Audit Perfomed   Called pt to schedule mammogram   No answer          Jessica TANNER LPN Care Coordinator  Care Coordination Department  [facility]  [phone]

## 2020-07-15 ENCOUNTER — PATIENT OUTREACH (OUTPATIENT)
Dept: ADMINISTRATIVE | Facility: HOSPITAL | Age: 70
End: 2020-07-15

## 2020-07-15 NOTE — PROGRESS NOTES
PreVisit Chart Audit Performed    updated with recent information     Called pt to schedule Mammogram   And Dig Med Pt does not want at this time        Jessica TANNER LPN Care Coordinator  Care Coordination Department  [facility]  [phone]

## 2020-07-29 ENCOUNTER — LAB VISIT (OUTPATIENT)
Dept: LAB | Facility: HOSPITAL | Age: 70
End: 2020-07-29
Attending: FAMILY MEDICINE
Payer: MEDICARE

## 2020-07-29 ENCOUNTER — OFFICE VISIT (OUTPATIENT)
Dept: FAMILY MEDICINE | Facility: CLINIC | Age: 70
End: 2020-07-29
Payer: MEDICARE

## 2020-07-29 VITALS
HEART RATE: 65 BPM | TEMPERATURE: 99 F | BODY MASS INDEX: 22.85 KG/M2 | DIASTOLIC BLOOD PRESSURE: 68 MMHG | SYSTOLIC BLOOD PRESSURE: 100 MMHG | HEIGHT: 66 IN | OXYGEN SATURATION: 99 % | WEIGHT: 142.19 LBS

## 2020-07-29 DIAGNOSIS — M85.80 OSTEOPENIA, UNSPECIFIED LOCATION: ICD-10-CM

## 2020-07-29 DIAGNOSIS — K75.4 AUTOIMMUNE HEPATITIS: Chronic | ICD-10-CM

## 2020-07-29 DIAGNOSIS — D84.9 IMMUNOSUPPRESSED STATUS: ICD-10-CM

## 2020-07-29 DIAGNOSIS — E78.5 HYPERLIPIDEMIA, UNSPECIFIED HYPERLIPIDEMIA TYPE: Chronic | ICD-10-CM

## 2020-07-29 DIAGNOSIS — I10 ESSENTIAL HYPERTENSION: Primary | Chronic | ICD-10-CM

## 2020-07-29 DIAGNOSIS — Z01.419 WELL FEMALE EXAM WITH ROUTINE GYNECOLOGICAL EXAM: ICD-10-CM

## 2020-07-29 PROCEDURE — 99214 OFFICE O/P EST MOD 30 MIN: CPT | Mod: 25,S$PBB,, | Performed by: FAMILY MEDICINE

## 2020-07-29 PROCEDURE — 84443 ASSAY THYROID STIM HORMONE: CPT

## 2020-07-29 PROCEDURE — 99214 PR OFFICE/OUTPT VISIT, EST, LEVL IV, 30-39 MIN: ICD-10-PCS | Mod: 25,S$PBB,, | Performed by: FAMILY MEDICINE

## 2020-07-29 PROCEDURE — 99213 OFFICE O/P EST LOW 20 MIN: CPT | Mod: PBBFAC,PO,25 | Performed by: FAMILY MEDICINE

## 2020-07-29 PROCEDURE — G0101 CA SCREEN;PELVIC/BREAST EXAM: HCPCS | Mod: PBBFAC,PO | Performed by: FAMILY MEDICINE

## 2020-07-29 PROCEDURE — 99999 PR PBB SHADOW E&M-EST. PATIENT-LVL III: ICD-10-PCS | Mod: PBBFAC,,, | Performed by: FAMILY MEDICINE

## 2020-07-29 PROCEDURE — 36415 COLL VENOUS BLD VENIPUNCTURE: CPT | Mod: PO

## 2020-07-29 PROCEDURE — 80061 LIPID PANEL: CPT

## 2020-07-29 PROCEDURE — 99999 PR PBB SHADOW E&M-EST. PATIENT-LVL III: CPT | Mod: PBBFAC,,, | Performed by: FAMILY MEDICINE

## 2020-07-29 RX ORDER — HYDROCODONE BITARTRATE AND ACETAMINOPHEN 7.5; 325 MG/1; MG/1
TABLET ORAL
COMMUNITY
Start: 2020-07-10 | End: 2020-07-29

## 2020-07-29 NOTE — PROGRESS NOTES
CHIEF COMPLAINT:  This is a 69-year-old female here for follow-up of chronic medical conditions and for preventive health exam.     SUBJECTIVE: Patient is doing well without complaints except for intermittent situational stress.  The patient's blood pressure is controlled on lisinopril HCT 10-12.5 mg daily.   She is taking Imuran and oral steroids for autoimmune hepatitis.  The patient has osteopenia but refuses bisphosphonates.       Eye exam August 2019. Mammogram December 2015 (refuses mammograms). Bone DEXA scan, August 2018, due again in August 2021.  Colonoscopy August 2016, due again in August 2021. Tdap April 2012. Pneumovax April 2012. Flu vaccine, October 2019.  Prevnar vaccine December 2015.     ROS:  GENERAL: Patient denies fever, chills, night sweats. Patient denies weight loss. Patient denies anorexia, fatigue, weakness or swollen glands.  SKIN: Patient denies rash or hair loss.  HEENT: Patient denies sore throat, ear pain, hearing loss, nasal congestion, or runny nose. Patient denies visual disturbance, eye irritation or discharge.  LUNGS: Patient denies cough, wheeze or hemoptysis.  CARDIOVASCULAR: Patient denies chest pain, shortness of breath, palpitations, syncope or lower extremity edema.  GI: Patient denies abdominal pain, nausea, vomiting, diarrhea, constipation, blood in stool or melena.  GENITOURINARY: Patient denies pelvic pain, vaginal discharge, itch or odor. Patient denies irregular vaginal bleeding. Patient denies dysuria, frequency, hematuria, nocturia, urgency or incontinence.  BREASTS: Patient denies breast pain, mass or nipple discharge.  MUSCULOSKELETAL: Patient denies joint swelling, redness or warmth. Positive for joint pain.  NEUROLOGIC: Patient denies headache, vertigo, paresthesias, weakness in limb, dysarthria, dysphagia or abnormality of gait.  PSYCHIATRIC: Patient denies anxiety, depression, or memory loss.     OBJECTIVE:   GENERAL: Well-developed well-nourished black female  alert and oriented x3, in no acute distress. Memory, judgment and cognition without deficit.  SKIN: Clear without rash. Normal color and tone. Lipoma left upper extremity. Ganglion cyst, left dorsal wrist. Small, movable nodule right dorsal foot.  Dry flaky skin, left foot.  HEENT: Eyes: Clear conjunctivae. Pupils equal reactive to light and accommodation. Ears: Clear TMs. Clear canals. Nose: Moderate nasal congestion, left greater than right. Pharynx: Without injection or exudates.  NECK: Supple, normal range of motion. No masses, nodes or enlarged thyroid. No JVD. Carotids 2+ and equal. No bruits.  LUNGS: Clear to auscultation. Normal respiratory effort.  CARDIOVASCULAR: Regular rhythm, normal S1, S2 without murmur, gallop or rub.  BACK: No CVA or spinal tenderness.  BREASTS: No masses, tenderness or nipple discharge.  ABDOMEN: Normal appearance. Active bowel sounds. Soft, nontender without mass or organomegaly. No rebound or guarding.  EXTREMITIES: Without cyanosis, clubbing or edema. Distal pulses 2+ and equal. Normal range of motion in all extremities. No joint effusion, erythema or warmth.  NEUROLOGIC: Cranial nerves II through XII slightly overweight without deficit. Motor strength equal bilaterally. Sensation normal to touch. Deep tendon reflexes 2+ and equal. Gait without abnormality. No tremor. Negative cerebellar signs.  PELVIC: External: Without lesions or inflammation. Vaginal: Clear. Atrophic changes. Slight anterior wall prolapse. Cuff intact. Bimanual: Non-tender, without masses. Rectovaginal: Confirms, heme-negative stool x2.    ASSESSMENT:  1. Essential hypertension    2. Hyperlipidemia, unspecified hyperlipidemia type    3. Autoimmune hepatitis    4. Osteopenia, unspecified location    5. Immunosuppressed status        PLAN:  1.  Exercise regularly.  2.  Age-appropriate counseling.  3.  Fasting lab.  4.  Refill lisinopril HCT.  5.  Follow-up annually.    This note is generated with speech  recognition software and is subject to transcription error and sound alike phrases that may be missed by proofreading.

## 2020-07-30 LAB
CHOLEST SERPL-MCNC: 236 MG/DL (ref 120–199)
CHOLEST/HDLC SERPL: 2.7 {RATIO} (ref 2–5)
HDLC SERPL-MCNC: 86 MG/DL (ref 40–75)
HDLC SERPL: 36.4 % (ref 20–50)
LDLC SERPL CALC-MCNC: 137 MG/DL (ref 63–159)
NONHDLC SERPL-MCNC: 150 MG/DL
TRIGL SERPL-MCNC: 65 MG/DL (ref 30–150)
TSH SERPL DL<=0.005 MIU/L-ACNC: 0.67 UIU/ML (ref 0.4–4)

## 2020-08-10 RX ORDER — LISINOPRIL AND HYDROCHLOROTHIAZIDE 10; 12.5 MG/1; MG/1
TABLET ORAL
Qty: 90 TABLET | Refills: 3 | Status: SHIPPED | OUTPATIENT
Start: 2020-08-10 | End: 2021-07-02

## 2020-08-18 ENCOUNTER — TELEPHONE (OUTPATIENT)
Dept: FAMILY MEDICINE | Facility: CLINIC | Age: 70
End: 2020-08-18

## 2020-08-18 NOTE — TELEPHONE ENCOUNTER
Pt states that she needs a handicap tag due to limited mobility after knee surgery. Please advise.//maged

## 2020-09-30 ENCOUNTER — PATIENT OUTREACH (OUTPATIENT)
Dept: ADMINISTRATIVE | Facility: HOSPITAL | Age: 70
End: 2020-09-30

## 2021-01-14 ENCOUNTER — OFFICE VISIT (OUTPATIENT)
Dept: GASTROENTEROLOGY | Facility: CLINIC | Age: 71
End: 2021-01-14
Payer: MEDICARE

## 2021-01-14 ENCOUNTER — LAB VISIT (OUTPATIENT)
Dept: LAB | Facility: HOSPITAL | Age: 71
End: 2021-01-14
Attending: INTERNAL MEDICINE
Payer: MEDICARE

## 2021-01-14 VITALS
HEIGHT: 66 IN | SYSTOLIC BLOOD PRESSURE: 120 MMHG | DIASTOLIC BLOOD PRESSURE: 78 MMHG | HEART RATE: 72 BPM | WEIGHT: 145.06 LBS | BODY MASS INDEX: 23.31 KG/M2

## 2021-01-14 DIAGNOSIS — K75.4 AUTOIMMUNE HEPATITIS: Primary | ICD-10-CM

## 2021-01-14 DIAGNOSIS — Z86.010 HX OF ADENOMATOUS COLONIC POLYPS: ICD-10-CM

## 2021-01-14 DIAGNOSIS — K75.4 AUTOIMMUNE HEPATITIS: ICD-10-CM

## 2021-01-14 LAB
BASOPHILS # BLD AUTO: 0.03 K/UL (ref 0–0.2)
BASOPHILS NFR BLD: 0.6 % (ref 0–1.9)
DIFFERENTIAL METHOD: ABNORMAL
EOSINOPHIL # BLD AUTO: 0.1 K/UL (ref 0–0.5)
EOSINOPHIL NFR BLD: 2.6 % (ref 0–8)
ERYTHROCYTE [DISTWIDTH] IN BLOOD BY AUTOMATED COUNT: 13.4 % (ref 11.5–14.5)
ERYTHROCYTE [SEDIMENTATION RATE] IN BLOOD BY WESTERGREN METHOD: 25 MM/HR (ref 0–20)
HCT VFR BLD AUTO: 37.6 % (ref 37–48.5)
HGB BLD-MCNC: 11 G/DL (ref 12–16)
IMM GRANULOCYTES # BLD AUTO: 0.01 K/UL (ref 0–0.04)
IMM GRANULOCYTES NFR BLD AUTO: 0.2 % (ref 0–0.5)
LYMPHOCYTES # BLD AUTO: 1.7 K/UL (ref 1–4.8)
LYMPHOCYTES NFR BLD: 33.9 % (ref 18–48)
MCH RBC QN AUTO: 29.3 PG (ref 27–31)
MCHC RBC AUTO-ENTMCNC: 29.3 G/DL (ref 32–36)
MCV RBC AUTO: 100 FL (ref 82–98)
MONOCYTES # BLD AUTO: 0.6 K/UL (ref 0.3–1)
MONOCYTES NFR BLD: 11.2 % (ref 4–15)
NEUTROPHILS # BLD AUTO: 2.6 K/UL (ref 1.8–7.7)
NEUTROPHILS NFR BLD: 51.5 % (ref 38–73)
NRBC BLD-RTO: 0 /100 WBC
PLATELET # BLD AUTO: 227 K/UL (ref 150–350)
PMV BLD AUTO: 10.4 FL (ref 9.2–12.9)
RBC # BLD AUTO: 3.75 M/UL (ref 4–5.4)
WBC # BLD AUTO: 4.98 K/UL (ref 3.9–12.7)

## 2021-01-14 PROCEDURE — 80048 BASIC METABOLIC PNL TOTAL CA: CPT

## 2021-01-14 PROCEDURE — 99499 UNLISTED E&M SERVICE: CPT | Mod: S$PBB,,, | Performed by: INTERNAL MEDICINE

## 2021-01-14 PROCEDURE — 99213 OFFICE O/P EST LOW 20 MIN: CPT | Mod: PBBFAC | Performed by: INTERNAL MEDICINE

## 2021-01-14 PROCEDURE — 85651 RBC SED RATE NONAUTOMATED: CPT

## 2021-01-14 PROCEDURE — 99499 NO LOS: ICD-10-PCS | Mod: S$PBB,,, | Performed by: INTERNAL MEDICINE

## 2021-01-14 PROCEDURE — 85025 COMPLETE CBC W/AUTO DIFF WBC: CPT

## 2021-01-14 PROCEDURE — 99999 PR PBB SHADOW E&M-EST. PATIENT-LVL III: ICD-10-PCS | Mod: PBBFAC,,, | Performed by: INTERNAL MEDICINE

## 2021-01-14 PROCEDURE — 36415 COLL VENOUS BLD VENIPUNCTURE: CPT

## 2021-01-14 PROCEDURE — 80076 HEPATIC FUNCTION PANEL: CPT

## 2021-01-14 PROCEDURE — 99999 PR PBB SHADOW E&M-EST. PATIENT-LVL III: CPT | Mod: PBBFAC,,, | Performed by: INTERNAL MEDICINE

## 2021-01-15 ENCOUNTER — TELEPHONE (OUTPATIENT)
Dept: ENDOSCOPY | Facility: HOSPITAL | Age: 71
End: 2021-01-15

## 2021-01-15 DIAGNOSIS — Z01.818 PRE-OP TESTING: ICD-10-CM

## 2021-01-15 LAB
ALBUMIN SERPL BCP-MCNC: 3.9 G/DL (ref 3.5–5.2)
ALP SERPL-CCNC: 50 U/L (ref 55–135)
ALT SERPL W/O P-5'-P-CCNC: 10 U/L (ref 10–44)
ANION GAP SERPL CALC-SCNC: 6 MMOL/L (ref 8–16)
AST SERPL-CCNC: 20 U/L (ref 10–40)
BILIRUB DIRECT SERPL-MCNC: 0.2 MG/DL (ref 0.1–0.3)
BILIRUB SERPL-MCNC: 0.6 MG/DL (ref 0.1–1)
BUN SERPL-MCNC: 20 MG/DL (ref 8–23)
CALCIUM SERPL-MCNC: 9.1 MG/DL (ref 8.7–10.5)
CHLORIDE SERPL-SCNC: 102 MMOL/L (ref 95–110)
CO2 SERPL-SCNC: 30 MMOL/L (ref 23–29)
CREAT SERPL-MCNC: 1 MG/DL (ref 0.5–1.4)
EST. GFR  (AFRICAN AMERICAN): >60 ML/MIN/1.73 M^2
EST. GFR  (NON AFRICAN AMERICAN): 56.4 ML/MIN/1.73 M^2
GLUCOSE SERPL-MCNC: 64 MG/DL (ref 70–110)
POTASSIUM SERPL-SCNC: 4.2 MMOL/L (ref 3.5–5.1)
PROT SERPL-MCNC: 7.8 G/DL (ref 6–8.4)
SODIUM SERPL-SCNC: 138 MMOL/L (ref 136–145)

## 2021-01-20 ENCOUNTER — TELEPHONE (OUTPATIENT)
Dept: GASTROENTEROLOGY | Facility: CLINIC | Age: 71
End: 2021-01-20

## 2021-01-22 RX ORDER — AZATHIOPRINE 50 MG/1
50 TABLET ORAL DAILY
Qty: 90 TABLET | Refills: 5 | Status: SHIPPED | OUTPATIENT
Start: 2021-01-22 | End: 2022-05-09

## 2021-02-03 ENCOUNTER — TELEPHONE (OUTPATIENT)
Dept: GASTROENTEROLOGY | Facility: CLINIC | Age: 71
End: 2021-02-03

## 2021-02-10 ENCOUNTER — TELEPHONE (OUTPATIENT)
Dept: ENDOSCOPY | Facility: HOSPITAL | Age: 71
End: 2021-02-10

## 2021-02-10 DIAGNOSIS — Z01.818 PRE-OP TESTING: ICD-10-CM

## 2021-04-30 ENCOUNTER — PES CALL (OUTPATIENT)
Dept: ADMINISTRATIVE | Facility: CLINIC | Age: 71
End: 2021-04-30

## 2021-06-01 ENCOUNTER — PES CALL (OUTPATIENT)
Dept: ADMINISTRATIVE | Facility: CLINIC | Age: 71
End: 2021-06-01

## 2021-06-25 RX ORDER — SODIUM, POTASSIUM,MAG SULFATES 17.5-3.13G
1 SOLUTION, RECONSTITUTED, ORAL ORAL DAILY
Qty: 1 KIT | Refills: 0 | Status: CANCELLED | OUTPATIENT
Start: 2021-06-25 | End: 2021-06-27

## 2021-07-02 RX ORDER — LISINOPRIL AND HYDROCHLOROTHIAZIDE 10; 12.5 MG/1; MG/1
TABLET ORAL
Qty: 90 TABLET | Refills: 0 | Status: SHIPPED | OUTPATIENT
Start: 2021-07-02 | End: 2021-09-24

## 2021-07-02 RX ORDER — PREDNISONE 5 MG/1
2.5 TABLET ORAL DAILY
Qty: 15 TABLET | Refills: 7 | Status: SHIPPED | OUTPATIENT
Start: 2021-07-02 | End: 2022-02-23

## 2021-07-09 ENCOUNTER — PATIENT OUTREACH (OUTPATIENT)
Dept: ADMINISTRATIVE | Facility: OTHER | Age: 71
End: 2021-07-09

## 2021-07-09 ENCOUNTER — OFFICE VISIT (OUTPATIENT)
Dept: OPHTHALMOLOGY | Facility: CLINIC | Age: 71
End: 2021-07-09
Payer: MEDICARE

## 2021-07-09 DIAGNOSIS — H25.11 SENILE NUCLEAR CATARACT, RIGHT: ICD-10-CM

## 2021-07-09 DIAGNOSIS — H25.12 SENILE NUCLEAR CATARACT, LEFT: Primary | ICD-10-CM

## 2021-07-09 DIAGNOSIS — H52.7 REFRACTION DISORDER: ICD-10-CM

## 2021-07-09 PROCEDURE — 99211 OFF/OP EST MAY X REQ PHY/QHP: CPT | Mod: PBBFAC | Performed by: OPHTHALMOLOGY

## 2021-07-09 PROCEDURE — 92015 PR REFRACTION: ICD-10-PCS | Mod: ,,, | Performed by: OPHTHALMOLOGY

## 2021-07-09 PROCEDURE — 99999 PR PBB SHADOW E&M-EST. PATIENT-LVL I: CPT | Mod: PBBFAC,,, | Performed by: OPHTHALMOLOGY

## 2021-07-09 PROCEDURE — 92015 DETERMINE REFRACTIVE STATE: CPT | Mod: ,,, | Performed by: OPHTHALMOLOGY

## 2021-07-09 PROCEDURE — 99999 PR PBB SHADOW E&M-EST. PATIENT-LVL I: ICD-10-PCS | Mod: PBBFAC,,, | Performed by: OPHTHALMOLOGY

## 2021-07-09 PROCEDURE — 92004 COMPRE OPH EXAM NEW PT 1/>: CPT | Mod: S$PBB,,, | Performed by: OPHTHALMOLOGY

## 2021-07-09 PROCEDURE — 92004 PR EYE EXAM, NEW PATIENT,COMPREHESV: ICD-10-PCS | Mod: S$PBB,,, | Performed by: OPHTHALMOLOGY

## 2021-07-14 ENCOUNTER — PES CALL (OUTPATIENT)
Dept: ADMINISTRATIVE | Facility: CLINIC | Age: 71
End: 2021-07-14

## 2021-07-23 RX ORDER — SODIUM, POTASSIUM,MAG SULFATES 17.5-3.13G
1 SOLUTION, RECONSTITUTED, ORAL ORAL DAILY
Qty: 1 KIT | Refills: 0 | Status: SHIPPED | OUTPATIENT
Start: 2021-07-23 | End: 2021-07-25

## 2021-07-29 ENCOUNTER — OFFICE VISIT (OUTPATIENT)
Dept: FAMILY MEDICINE | Facility: CLINIC | Age: 71
End: 2021-07-29
Payer: MEDICARE

## 2021-07-29 ENCOUNTER — LAB VISIT (OUTPATIENT)
Dept: LAB | Facility: HOSPITAL | Age: 71
End: 2021-07-29
Attending: FAMILY MEDICINE
Payer: MEDICARE

## 2021-07-29 VITALS
OXYGEN SATURATION: 99 % | HEART RATE: 55 BPM | TEMPERATURE: 98 F | WEIGHT: 141.13 LBS | HEIGHT: 66 IN | SYSTOLIC BLOOD PRESSURE: 110 MMHG | BODY MASS INDEX: 22.68 KG/M2 | DIASTOLIC BLOOD PRESSURE: 70 MMHG

## 2021-07-29 DIAGNOSIS — I10 ESSENTIAL HYPERTENSION: Primary | Chronic | ICD-10-CM

## 2021-07-29 DIAGNOSIS — K75.4 AUTOIMMUNE HEPATITIS: Chronic | ICD-10-CM

## 2021-07-29 DIAGNOSIS — I10 ESSENTIAL HYPERTENSION: Chronic | ICD-10-CM

## 2021-07-29 DIAGNOSIS — Z78.0 POSTMENOPAUSAL: ICD-10-CM

## 2021-07-29 DIAGNOSIS — F41.8 ANXIETY ASSOCIATED WITH DEPRESSION: ICD-10-CM

## 2021-07-29 DIAGNOSIS — E78.5 HYPERLIPIDEMIA, UNSPECIFIED HYPERLIPIDEMIA TYPE: Chronic | ICD-10-CM

## 2021-07-29 LAB
BASOPHILS # BLD AUTO: 0.01 K/UL (ref 0–0.2)
BASOPHILS NFR BLD: 0.2 % (ref 0–1.9)
DIFFERENTIAL METHOD: ABNORMAL
EOSINOPHIL # BLD AUTO: 0.3 K/UL (ref 0–0.5)
EOSINOPHIL NFR BLD: 4.9 % (ref 0–8)
ERYTHROCYTE [DISTWIDTH] IN BLOOD BY AUTOMATED COUNT: 13.3 % (ref 11.5–14.5)
HCT VFR BLD AUTO: 36.7 % (ref 37–48.5)
HGB BLD-MCNC: 11.2 G/DL (ref 12–16)
IMM GRANULOCYTES # BLD AUTO: 0.02 K/UL (ref 0–0.04)
IMM GRANULOCYTES NFR BLD AUTO: 0.4 % (ref 0–0.5)
LYMPHOCYTES # BLD AUTO: 1.7 K/UL (ref 1–4.8)
LYMPHOCYTES NFR BLD: 31.6 % (ref 18–48)
MCH RBC QN AUTO: 29.2 PG (ref 27–31)
MCHC RBC AUTO-ENTMCNC: 30.5 G/DL (ref 32–36)
MCV RBC AUTO: 96 FL (ref 82–98)
MONOCYTES # BLD AUTO: 0.5 K/UL (ref 0.3–1)
MONOCYTES NFR BLD: 9.8 % (ref 4–15)
NEUTROPHILS # BLD AUTO: 2.8 K/UL (ref 1.8–7.7)
NEUTROPHILS NFR BLD: 53.1 % (ref 38–73)
NRBC BLD-RTO: 0 /100 WBC
PLATELET # BLD AUTO: 198 K/UL (ref 150–450)
PMV BLD AUTO: 10.4 FL (ref 9.2–12.9)
RBC # BLD AUTO: 3.83 M/UL (ref 4–5.4)
WBC # BLD AUTO: 5.31 K/UL (ref 3.9–12.7)

## 2021-07-29 PROCEDURE — 36415 COLL VENOUS BLD VENIPUNCTURE: CPT | Mod: PO | Performed by: FAMILY MEDICINE

## 2021-07-29 PROCEDURE — 80061 LIPID PANEL: CPT | Performed by: FAMILY MEDICINE

## 2021-07-29 PROCEDURE — 84443 ASSAY THYROID STIM HORMONE: CPT | Performed by: FAMILY MEDICINE

## 2021-07-29 PROCEDURE — 80053 COMPREHEN METABOLIC PANEL: CPT | Performed by: FAMILY MEDICINE

## 2021-07-29 PROCEDURE — 99999 PR PBB SHADOW E&M-EST. PATIENT-LVL III: ICD-10-PCS | Mod: PBBFAC,,, | Performed by: FAMILY MEDICINE

## 2021-07-29 PROCEDURE — 99213 OFFICE O/P EST LOW 20 MIN: CPT | Mod: PBBFAC,PO | Performed by: FAMILY MEDICINE

## 2021-07-29 PROCEDURE — 99214 PR OFFICE/OUTPT VISIT, EST, LEVL IV, 30-39 MIN: ICD-10-PCS | Mod: S$PBB,,, | Performed by: FAMILY MEDICINE

## 2021-07-29 PROCEDURE — 99214 OFFICE O/P EST MOD 30 MIN: CPT | Mod: S$PBB,,, | Performed by: FAMILY MEDICINE

## 2021-07-29 PROCEDURE — 85025 COMPLETE CBC W/AUTO DIFF WBC: CPT | Performed by: FAMILY MEDICINE

## 2021-07-29 PROCEDURE — 99999 PR PBB SHADOW E&M-EST. PATIENT-LVL III: CPT | Mod: PBBFAC,,, | Performed by: FAMILY MEDICINE

## 2021-07-30 LAB
ALBUMIN SERPL BCP-MCNC: 3.8 G/DL (ref 3.5–5.2)
ALP SERPL-CCNC: 45 U/L (ref 55–135)
ALT SERPL W/O P-5'-P-CCNC: 11 U/L (ref 10–44)
ANION GAP SERPL CALC-SCNC: 7 MMOL/L (ref 8–16)
AST SERPL-CCNC: 20 U/L (ref 10–40)
BILIRUB SERPL-MCNC: 0.7 MG/DL (ref 0.1–1)
BUN SERPL-MCNC: 23 MG/DL (ref 8–23)
CALCIUM SERPL-MCNC: 10 MG/DL (ref 8.7–10.5)
CHLORIDE SERPL-SCNC: 105 MMOL/L (ref 95–110)
CHOLEST SERPL-MCNC: 244 MG/DL (ref 120–199)
CHOLEST/HDLC SERPL: 3.4 {RATIO} (ref 2–5)
CO2 SERPL-SCNC: 27 MMOL/L (ref 23–29)
CREAT SERPL-MCNC: 1.2 MG/DL (ref 0.5–1.4)
EST. GFR  (AFRICAN AMERICAN): 52.2 ML/MIN/1.73 M^2
EST. GFR  (NON AFRICAN AMERICAN): 45.3 ML/MIN/1.73 M^2
GLUCOSE SERPL-MCNC: 71 MG/DL (ref 70–110)
HDLC SERPL-MCNC: 72 MG/DL (ref 40–75)
HDLC SERPL: 29.5 % (ref 20–50)
LDLC SERPL CALC-MCNC: 154 MG/DL (ref 63–159)
NONHDLC SERPL-MCNC: 172 MG/DL
POTASSIUM SERPL-SCNC: 4.4 MMOL/L (ref 3.5–5.1)
PROT SERPL-MCNC: 8.1 G/DL (ref 6–8.4)
SODIUM SERPL-SCNC: 139 MMOL/L (ref 136–145)
TRIGL SERPL-MCNC: 90 MG/DL (ref 30–150)
TSH SERPL DL<=0.005 MIU/L-ACNC: 0.65 UIU/ML (ref 0.4–4)

## 2021-08-19 ENCOUNTER — APPOINTMENT (OUTPATIENT)
Dept: RADIOLOGY | Facility: HOSPITAL | Age: 71
End: 2021-08-19
Attending: FAMILY MEDICINE
Payer: MEDICARE

## 2021-08-19 ENCOUNTER — TELEPHONE (OUTPATIENT)
Dept: RADIOLOGY | Facility: HOSPITAL | Age: 71
End: 2021-08-19

## 2021-08-19 DIAGNOSIS — Z78.0 POSTMENOPAUSAL: ICD-10-CM

## 2021-08-19 PROCEDURE — 77080 DEXA BONE DENSITY SPINE HIP: ICD-10-PCS | Mod: 26,,, | Performed by: RADIOLOGY

## 2021-08-19 PROCEDURE — 77080 DXA BONE DENSITY AXIAL: CPT | Mod: TC

## 2021-08-19 PROCEDURE — 77080 DXA BONE DENSITY AXIAL: CPT | Mod: 26,,, | Performed by: RADIOLOGY

## 2021-09-21 ENCOUNTER — PES CALL (OUTPATIENT)
Dept: ADMINISTRATIVE | Facility: CLINIC | Age: 71
End: 2021-09-21

## 2021-09-24 RX ORDER — LISINOPRIL AND HYDROCHLOROTHIAZIDE 10; 12.5 MG/1; MG/1
TABLET ORAL
Qty: 90 TABLET | Refills: 3 | Status: SHIPPED | OUTPATIENT
Start: 2021-09-24 | End: 2022-10-11

## 2021-10-01 ENCOUNTER — OFFICE VISIT (OUTPATIENT)
Dept: FAMILY MEDICINE | Facility: CLINIC | Age: 71
End: 2021-10-01
Payer: MEDICARE

## 2021-10-01 VITALS
HEART RATE: 84 BPM | OXYGEN SATURATION: 99 % | RESPIRATION RATE: 18 BRPM | BODY MASS INDEX: 21.97 KG/M2 | DIASTOLIC BLOOD PRESSURE: 80 MMHG | TEMPERATURE: 98 F | WEIGHT: 136.69 LBS | HEIGHT: 66 IN | SYSTOLIC BLOOD PRESSURE: 110 MMHG

## 2021-10-01 DIAGNOSIS — J01.90 ACUTE RHINOSINUSITIS: ICD-10-CM

## 2021-10-01 DIAGNOSIS — R05.9 COUGH: Primary | ICD-10-CM

## 2021-10-01 PROCEDURE — 99214 OFFICE O/P EST MOD 30 MIN: CPT | Mod: PBBFAC,25,PO | Performed by: FAMILY MEDICINE

## 2021-10-01 PROCEDURE — 96372 THER/PROPH/DIAG INJ SC/IM: CPT | Mod: PBBFAC,PO

## 2021-10-01 PROCEDURE — 99213 OFFICE O/P EST LOW 20 MIN: CPT | Mod: 25,S$PBB,, | Performed by: FAMILY MEDICINE

## 2021-10-01 PROCEDURE — 99999 PR PBB SHADOW E&M-EST. PATIENT-LVL IV: ICD-10-PCS | Mod: PBBFAC,,, | Performed by: FAMILY MEDICINE

## 2021-10-01 PROCEDURE — 99999 PR PBB SHADOW E&M-EST. PATIENT-LVL IV: CPT | Mod: PBBFAC,,, | Performed by: FAMILY MEDICINE

## 2021-10-01 PROCEDURE — 99213 PR OFFICE/OUTPT VISIT, EST, LEVL III, 20-29 MIN: ICD-10-PCS | Mod: 25,S$PBB,, | Performed by: FAMILY MEDICINE

## 2021-10-01 RX ORDER — BETAMETHASONE SODIUM PHOSPHATE AND BETAMETHASONE ACETATE 3; 3 MG/ML; MG/ML
9 INJECTION, SUSPENSION INTRA-ARTICULAR; INTRALESIONAL; INTRAMUSCULAR; SOFT TISSUE
Status: COMPLETED | OUTPATIENT
Start: 2021-10-01 | End: 2021-10-01

## 2021-10-01 RX ORDER — AZITHROMYCIN 250 MG/1
TABLET, FILM COATED ORAL
Qty: 6 TABLET | Refills: 0 | Status: SHIPPED | OUTPATIENT
Start: 2021-10-01 | End: 2021-11-18

## 2021-10-01 RX ADMIN — BETAMETHASONE SODIUM PHOSPHATE AND BETAMETHASONE ACETATE 9 MG: 3; 3 INJECTION, SUSPENSION INTRA-ARTICULAR; INTRALESIONAL; INTRAMUSCULAR at 10:10

## 2021-10-14 ENCOUNTER — IMMUNIZATION (OUTPATIENT)
Dept: FAMILY MEDICINE | Facility: CLINIC | Age: 71
End: 2021-10-14
Payer: MEDICARE

## 2021-10-14 PROCEDURE — 90694 VACC AIIV4 NO PRSRV 0.5ML IM: CPT | Mod: PBBFAC,PO

## 2021-10-14 PROCEDURE — G0008 ADMIN INFLUENZA VIRUS VAC: HCPCS | Mod: PBBFAC,PO

## 2021-11-18 ENCOUNTER — OFFICE VISIT (OUTPATIENT)
Dept: HOME HEALTH SERVICES | Facility: CLINIC | Age: 71
End: 2021-11-18
Payer: MEDICARE

## 2021-11-18 VITALS
BODY MASS INDEX: 21.86 KG/M2 | OXYGEN SATURATION: 98 % | TEMPERATURE: 98 F | HEART RATE: 64 BPM | DIASTOLIC BLOOD PRESSURE: 77 MMHG | HEIGHT: 66 IN | WEIGHT: 136 LBS | SYSTOLIC BLOOD PRESSURE: 112 MMHG

## 2021-11-18 DIAGNOSIS — I10 ESSENTIAL HYPERTENSION: Chronic | ICD-10-CM

## 2021-11-18 DIAGNOSIS — K75.4 AUTOIMMUNE HEPATITIS: Chronic | ICD-10-CM

## 2021-11-18 DIAGNOSIS — E55.9 VITAMIN D DEFICIENCY DISEASE: ICD-10-CM

## 2021-11-18 DIAGNOSIS — D84.9 IMMUNOSUPPRESSED STATUS: ICD-10-CM

## 2021-11-18 DIAGNOSIS — E78.5 HYPERLIPIDEMIA, UNSPECIFIED HYPERLIPIDEMIA TYPE: Chronic | ICD-10-CM

## 2021-11-18 DIAGNOSIS — M85.80 OSTEOPENIA, UNSPECIFIED LOCATION: ICD-10-CM

## 2021-11-18 DIAGNOSIS — Z00.00 ENCOUNTER FOR PREVENTIVE HEALTH EXAMINATION: Primary | ICD-10-CM

## 2021-11-18 DIAGNOSIS — M17.11 OSTEOARTHRITIS OF RIGHT KNEE, UNSPECIFIED OSTEOARTHRITIS TYPE: ICD-10-CM

## 2021-11-18 DIAGNOSIS — D64.9 ANEMIA, UNSPECIFIED TYPE: ICD-10-CM

## 2021-11-18 DIAGNOSIS — J30.1 SEASONAL ALLERGIC RHINITIS DUE TO POLLEN: ICD-10-CM

## 2021-11-18 DIAGNOSIS — F41.8 ANXIETY ASSOCIATED WITH DEPRESSION: ICD-10-CM

## 2021-11-18 PROCEDURE — G0439 PR MEDICARE ANNUAL WELLNESS SUBSEQUENT VISIT: ICD-10-PCS | Mod: S$GLB,,, | Performed by: NURSE PRACTITIONER

## 2021-11-18 PROCEDURE — G0439 PPPS, SUBSEQ VISIT: HCPCS | Mod: S$GLB,,, | Performed by: NURSE PRACTITIONER

## 2022-02-23 DIAGNOSIS — D84.9 IMMUNOSUPPRESSED STATUS: ICD-10-CM

## 2022-02-28 ENCOUNTER — TELEPHONE (OUTPATIENT)
Dept: GASTROENTEROLOGY | Facility: CLINIC | Age: 72
End: 2022-02-28
Payer: MEDICARE

## 2022-02-28 NOTE — TELEPHONE ENCOUNTER
----- Message from Mauricio Dia sent at 2/28/2022  3:02 PM CST -----  Contact: [phone]  Patient is calling regarding appt. Wants to know if she need to do any labs before appt, please call back at [phone]. Thanks/dj

## 2022-03-03 ENCOUNTER — TELEPHONE (OUTPATIENT)
Dept: GASTROENTEROLOGY | Facility: CLINIC | Age: 72
End: 2022-03-03
Payer: MEDICARE

## 2022-03-03 ENCOUNTER — OFFICE VISIT (OUTPATIENT)
Dept: GASTROENTEROLOGY | Facility: CLINIC | Age: 72
End: 2022-03-03
Payer: MEDICARE

## 2022-03-03 ENCOUNTER — LAB VISIT (OUTPATIENT)
Dept: LAB | Facility: HOSPITAL | Age: 72
End: 2022-03-03
Attending: INTERNAL MEDICINE
Payer: MEDICARE

## 2022-03-03 VITALS
OXYGEN SATURATION: 98 % | HEART RATE: 58 BPM | HEIGHT: 66 IN | DIASTOLIC BLOOD PRESSURE: 86 MMHG | WEIGHT: 138.75 LBS | SYSTOLIC BLOOD PRESSURE: 120 MMHG | BODY MASS INDEX: 22.3 KG/M2

## 2022-03-03 DIAGNOSIS — Z86.010 HX OF ADENOMATOUS COLONIC POLYPS: Primary | ICD-10-CM

## 2022-03-03 DIAGNOSIS — K75.4 AUTOIMMUNE HEPATITIS: ICD-10-CM

## 2022-03-03 DIAGNOSIS — Z86.010 HX OF ADENOMATOUS COLONIC POLYPS: ICD-10-CM

## 2022-03-03 LAB
ALBUMIN SERPL BCP-MCNC: 3.6 G/DL (ref 3.5–5.2)
ALP SERPL-CCNC: 44 U/L (ref 55–135)
ALT SERPL W/O P-5'-P-CCNC: 10 U/L (ref 10–44)
AST SERPL-CCNC: 18 U/L (ref 10–40)
BILIRUB DIRECT SERPL-MCNC: 0.2 MG/DL (ref 0.1–0.3)
BILIRUB SERPL-MCNC: 0.5 MG/DL (ref 0.1–1)
PROT SERPL-MCNC: 7.2 G/DL (ref 6–8.4)

## 2022-03-03 PROCEDURE — 80076 HEPATIC FUNCTION PANEL: CPT | Performed by: INTERNAL MEDICINE

## 2022-03-03 PROCEDURE — 99999 PR PBB SHADOW E&M-EST. PATIENT-LVL IV: ICD-10-PCS | Mod: PBBFAC,,, | Performed by: INTERNAL MEDICINE

## 2022-03-03 PROCEDURE — 36415 COLL VENOUS BLD VENIPUNCTURE: CPT | Performed by: INTERNAL MEDICINE

## 2022-03-03 PROCEDURE — 99214 OFFICE O/P EST MOD 30 MIN: CPT | Mod: PBBFAC | Performed by: INTERNAL MEDICINE

## 2022-03-03 PROCEDURE — 99213 OFFICE O/P EST LOW 20 MIN: CPT | Mod: S$PBB,,, | Performed by: INTERNAL MEDICINE

## 2022-03-03 PROCEDURE — 99999 PR PBB SHADOW E&M-EST. PATIENT-LVL IV: CPT | Mod: PBBFAC,,, | Performed by: INTERNAL MEDICINE

## 2022-03-03 PROCEDURE — 99213 PR OFFICE/OUTPT VISIT, EST, LEVL III, 20-29 MIN: ICD-10-PCS | Mod: S$PBB,,, | Performed by: INTERNAL MEDICINE

## 2022-03-03 RX ORDER — SODIUM, POTASSIUM,MAG SULFATES 17.5-3.13G
1 SOLUTION, RECONSTITUTED, ORAL ORAL DAILY
Qty: 1 KIT | Refills: 0 | Status: SHIPPED | OUTPATIENT
Start: 2022-03-03 | End: 2022-03-05

## 2022-03-03 NOTE — PROGRESS NOTES
Subjective:       Patient ID: Julianna Montemayor is a 71 y.o. female.    Chief Complaint: Follow-up (Pt. Stated is just time for checkup with dr. Reeves, no complaint)    The patient is here for follow-up. She carries a diagnosis of autoimmune hepatitis and has been under treatment with very low dose prednisone and Imuran. Her LFTs remain well WNL, but each time she has been attempted to be weaned in the past LFTs have increased again. Will have her labs repeated today; last done in July last year. Will have her see Dr Santos in 6 months to have discussion regarding whether another attempt should be made at discontinuing therapy.    She also has a history of Colon polyps. Her last colonoscopy was in 8/2016 with a small polyp- removed. She is in need of surveillance now.     Review of Systems   Constitutional: Negative for activity change, appetite change, chills, diaphoresis, fatigue, fever and unexpected weight change.   HENT: Negative for congestion, ear discharge, ear pain, hearing loss, nosebleeds, postnasal drip and tinnitus.    Eyes: Negative for photophobia and visual disturbance.   Respiratory: Negative for apnea, cough, choking, chest tightness, shortness of breath and wheezing.    Cardiovascular: Negative for chest pain, palpitations and leg swelling.   Gastrointestinal: Negative for abdominal distention, abdominal pain, anal bleeding, blood in stool, constipation, diarrhea, nausea, rectal pain and vomiting.   Genitourinary: Negative for difficulty urinating, dyspareunia, dysuria, flank pain, frequency, hematuria, menstrual problem, pelvic pain, urgency, vaginal bleeding and vaginal discharge.   Musculoskeletal: Negative for arthralgias, back pain, gait problem, joint swelling, myalgias and neck stiffness.   Skin: Negative for pallor and rash.   Neurological: Negative for dizziness, tremors, seizures, syncope, speech difficulty, weakness, numbness and headaches.   Hematological: Negative for adenopathy.    Psychiatric/Behavioral: Negative for agitation, confusion, hallucinations, sleep disturbance and suicidal ideas.       Objective:      Physical Exam  Vitals reviewed.   Constitutional:       Appearance: She is well-developed.   HENT:      Head: Normocephalic and atraumatic.   Eyes:      General: No scleral icterus.        Right eye: No discharge.         Left eye: No discharge.      Conjunctiva/sclera: Conjunctivae normal.      Pupils: Pupils are equal, round, and reactive to light.   Neck:      Thyroid: No thyromegaly.      Vascular: No JVD.   Cardiovascular:      Rate and Rhythm: Normal rate and regular rhythm.      Heart sounds: Normal heart sounds. No murmur heard.    No friction rub. No gallop.   Pulmonary:      Effort: Pulmonary effort is normal. No respiratory distress.      Breath sounds: Normal breath sounds. No wheezing or rales.   Chest:      Chest wall: No tenderness.   Abdominal:      General: Bowel sounds are normal. There is no distension.      Palpations: Abdomen is soft. There is no mass.      Tenderness: There is no abdominal tenderness. There is no guarding or rebound.   Musculoskeletal:         General: Normal range of motion.      Cervical back: Normal range of motion and neck supple.   Lymphadenopathy:      Cervical: No cervical adenopathy.   Skin:     General: Skin is warm and dry.      Coloration: Skin is not pale.      Findings: No erythema or rash.   Neurological:      Mental Status: She is alert and oriented to person, place, and time.      Motor: No abnormal muscle tone.      Coordination: Coordination normal.      Deep Tendon Reflexes: Reflexes are normal and symmetric.   Psychiatric:         Behavior: Behavior normal.         Thought Content: Thought content normal.         Judgment: Judgment normal.         Assessment:   Hx of adenomatous colonic polyps  -     Case Request Endoscopy: COLONOSCOPY  -     sodium,potassium,mag sulfates (SUPREP BOWEL PREP KIT) 17.5-3.13-1.6 gram SolR;  Take 177 mLs by mouth once daily. for 2 days  Dispense: 1 kit; Refill: 0  -     Hepatic Function Panel; Standing    Autoimmune hepatitis  -     Ambulatory referral/consult to Hepatology; Future; Expected date: 03/10/2022          Plan:   As above.

## 2022-03-03 NOTE — TELEPHONE ENCOUNTER
Pt. refused to be scheduled for her colonoscopy today, stated that, she will call later to schedule if she she decide to do it.

## 2022-05-09 RX ORDER — AZATHIOPRINE 50 MG/1
TABLET ORAL
Qty: 90 TABLET | Refills: 4 | Status: SHIPPED | OUTPATIENT
Start: 2022-05-09 | End: 2022-10-05

## 2022-05-10 NOTE — TELEPHONE ENCOUNTER
----- Message from Leda Hilario sent at 5/10/2022  8:10 AM CDT -----  Contact: [facility]  Type:  Patient Returning Call    [facility] Called[facility]  Who Left Message for Patient: unknown   Does the patient know what this is regarding? no  Would the patient rather a call back or a response via [facility]? Call back  Best Call Back Number: [phone]  Additional Information: n/a

## 2022-05-10 NOTE — TELEPHONE ENCOUNTER
Katie Huffman, I already sent you a message on this pt. Regarding the call from her pharmacy about checking to see if you want to change the prep. I called the pt. Back to infrom her that we working on it, she said, just go ahead and cancel the procedure, I just want to notify you.

## 2022-05-10 NOTE — TELEPHONE ENCOUNTER
----- Message from Iris Huntley sent at 5/10/2022  9:41 AM CDT -----  Contact: Patient @ [phone]  Good Morning,  Patient state insurance will not cover Rx and she cannot get it.    Patient need to cancel her surgery    Please call and advise

## 2022-05-10 NOTE — TELEPHONE ENCOUNTER
Hey Doc, Pt. Pharmacy stated that the prep you send for the pt. was not cover by her insurance, but Nulytely, shilpi-frederick florian are covered, do you want to switch the pt. over?.

## 2022-05-11 ENCOUNTER — TELEPHONE (OUTPATIENT)
Dept: ENDOSCOPY | Facility: HOSPITAL | Age: 72
End: 2022-05-11
Payer: MEDICARE

## 2022-05-11 ENCOUNTER — TELEPHONE (OUTPATIENT)
Dept: GASTROENTEROLOGY | Facility: CLINIC | Age: 72
End: 2022-05-11
Payer: MEDICARE

## 2022-05-11 NOTE — TELEPHONE ENCOUNTER
"I called the pt. Back and instructions to the new prep " Go lyt ely" given to her, she verbalized understanding.  "

## 2022-05-11 NOTE — TELEPHONE ENCOUNTER
----- Message from Abdirahman Smiley sent at 5/11/2022  8:21 AM CDT -----  Contact: CONY VERDUGO [[id number]]  .Type:  Needs Medical Advice    Who Called: CONY VERDUGO [[id number]]  Symptoms (please be specific):  How long has patient had these symptoms:  Pharmacy name and phone #:   Would the patient rather a call back or a response via [facility]?   Call    Best Call Back Number:   [phone] (home)   Additional Information:  Pt is requesting  a call back from the nurse in regards to  the pt Procedure on 05/12/2022 please

## 2022-05-11 NOTE — TELEPHONE ENCOUNTER
----- Message from Tonia Edmonds sent at 5/11/2022  1:44 PM CDT -----  Type:  Patient Returning Call    Who Called:pt   Who Left Message for Patient:?  Does the patient know what this is regarding?:she has a colonoscopy on tomorrow  Would the patient rather a call back or a response via [facility]? call  Best Call Back Number:[phone]  Additional Information: .    Thank you

## 2022-05-12 ENCOUNTER — HOSPITAL ENCOUNTER (OUTPATIENT)
Facility: HOSPITAL | Age: 72
Discharge: HOME | End: 2022-05-12
Attending: INTERNAL MEDICINE | Admitting: INTERNAL MEDICINE
Payer: MEDICARE

## 2022-05-12 ENCOUNTER — ANESTHESIA EVENT (OUTPATIENT)
Dept: ENDOSCOPY | Facility: HOSPITAL | Age: 72
End: 2022-05-12
Payer: MEDICARE

## 2022-05-12 ENCOUNTER — ANESTHESIA (OUTPATIENT)
Dept: ENDOSCOPY | Facility: HOSPITAL | Age: 72
End: 2022-05-12
Payer: MEDICARE

## 2022-05-12 DIAGNOSIS — D12.3 ADENOMATOUS POLYP OF TRANSVERSE COLON: ICD-10-CM

## 2022-05-12 DIAGNOSIS — Z86.010 HX OF ADENOMATOUS COLONIC POLYPS: Primary | ICD-10-CM

## 2022-05-12 PROCEDURE — 88305 TISSUE EXAM BY PATHOLOGIST: CPT | Performed by: PATHOLOGY

## 2022-05-12 PROCEDURE — 37000009 HC ANESTHESIA EA ADD 15 MINS: Performed by: INTERNAL MEDICINE

## 2022-05-12 PROCEDURE — 88305 TISSUE EXAM BY PATHOLOGIST: CPT | Mod: 26,,, | Performed by: PATHOLOGY

## 2022-05-12 PROCEDURE — 37000008 HC ANESTHESIA 1ST 15 MINUTES: Performed by: INTERNAL MEDICINE

## 2022-05-12 PROCEDURE — 27201012 HC FORCEPS, HOT/COLD, DISP: Performed by: INTERNAL MEDICINE

## 2022-05-12 PROCEDURE — 45380 COLONOSCOPY AND BIOPSY: CPT | Performed by: INTERNAL MEDICINE

## 2022-05-12 PROCEDURE — 45380 PR COLONOSCOPY,BIOPSY: ICD-10-PCS | Mod: PT,,, | Performed by: INTERNAL MEDICINE

## 2022-05-12 PROCEDURE — 25000003 PHARM REV CODE 250: Performed by: INTERNAL MEDICINE

## 2022-05-12 PROCEDURE — 88305 TISSUE EXAM BY PATHOLOGIST: ICD-10-PCS | Mod: 26,,, | Performed by: PATHOLOGY

## 2022-05-12 PROCEDURE — 63600175 PHARM REV CODE 636 W HCPCS: Performed by: STUDENT IN AN ORGANIZED HEALTH CARE EDUCATION/TRAINING PROGRAM

## 2022-05-12 PROCEDURE — 45380 COLONOSCOPY AND BIOPSY: CPT | Mod: PT,,, | Performed by: INTERNAL MEDICINE

## 2022-05-12 PROCEDURE — 25000003 PHARM REV CODE 250: Performed by: STUDENT IN AN ORGANIZED HEALTH CARE EDUCATION/TRAINING PROGRAM

## 2022-05-12 RX ORDER — SODIUM CHLORIDE, SODIUM LACTATE, POTASSIUM CHLORIDE, CALCIUM CHLORIDE 600; 310; 30; 20 MG/100ML; MG/100ML; MG/100ML; MG/100ML
INJECTION, SOLUTION INTRAVENOUS CONTINUOUS PRN
Status: DISCONTINUED | OUTPATIENT
Start: 2022-05-12 | End: 2022-05-12

## 2022-05-12 RX ORDER — PROPOFOL 10 MG/ML
VIAL (ML) INTRAVENOUS
Status: DISCONTINUED | OUTPATIENT
Start: 2022-05-12 | End: 2022-05-12

## 2022-05-12 RX ORDER — SODIUM CHLORIDE 0.9 % (FLUSH) 0.9 %
10 SYRINGE (ML) INJECTION
Status: DISCONTINUED | OUTPATIENT
Start: 2022-05-12 | End: 2022-05-12 | Stop reason: HOSPADM

## 2022-05-12 RX ORDER — LIDOCAINE HYDROCHLORIDE 10 MG/ML
INJECTION, SOLUTION EPIDURAL; INFILTRATION; INTRACAUDAL; PERINEURAL
Status: DISCONTINUED | OUTPATIENT
Start: 2022-05-12 | End: 2022-05-12

## 2022-05-12 RX ORDER — SODIUM CHLORIDE, SODIUM LACTATE, POTASSIUM CHLORIDE, CALCIUM CHLORIDE 600; 310; 30; 20 MG/100ML; MG/100ML; MG/100ML; MG/100ML
INJECTION, SOLUTION INTRAVENOUS CONTINUOUS
Status: DISCONTINUED | OUTPATIENT
Start: 2022-05-12 | End: 2022-05-12 | Stop reason: HOSPADM

## 2022-05-12 RX ORDER — DEXTROMETHORPHAN/PSEUDOEPHED 2.5-7.5/.8
DROPS ORAL
Status: COMPLETED | OUTPATIENT
Start: 2022-05-12 | End: 2022-05-12

## 2022-05-12 RX ADMIN — SIMETHICONE 66.6 MG: 20 SUSPENSION/ DROPS ORAL at 09:05

## 2022-05-12 RX ADMIN — PROPOFOL 50 MG: 10 INJECTION, EMULSION INTRAVENOUS at 09:05

## 2022-05-12 RX ADMIN — SODIUM CHLORIDE, SODIUM LACTATE, POTASSIUM CHLORIDE, AND CALCIUM CHLORIDE: 600; 310; 30; 20 INJECTION, SOLUTION INTRAVENOUS at 09:05

## 2022-05-12 RX ADMIN — LIDOCAINE HYDROCHLORIDE 100 MG: 10 INJECTION, SOLUTION EPIDURAL; INFILTRATION; INTRACAUDAL; PERINEURAL at 09:05

## 2022-05-12 NOTE — PROVATION PATIENT INSTRUCTIONS
Discharge Summary/Instructions after an Endoscopic Procedure  Patient Name: Julianna Montemayor  Patient MRN: [id number]  Patient YOB: 1950  Thursday, May 12, 2022 Bran Reeves III, MD  Dear patient,  As a result of recent federal legislation (The Federal Cures Act), you may   receive lab or pathology results from your procedure in your [facility]   account before your physician is able to contact you. Your physician or   their representative will relay the results to you with their   recommendations at their soonest availability.  Thank you,  RESTRICTIONS:  During your procedure today, you received medications for sedation.  These   medications may affect your judgment, balance and coordination.  Therefore,   for 24 hours, you have the following restrictions:   - DO NOT drive a car, operate machinery, make legal/financial decisions,   sign important papers or drink alcohol.    ACTIVITY:  Today: no heavy lifting, straining or running due to procedural   sedation/anesthesia.  The following day: return to full activity including work.  DIET:  Eat and drink normally unless instructed otherwise.     TREATMENT FOR COMMON SIDE EFFECTS:  - Mild abdominal pain, nausea, belching, bloating or excessive gas:  rest,   eat lightly and use a heating pad.  - Sore Throat: treat with throat lozenges and/or gargle with warm salt   water.  - Because air was used during the procedure, expelling large amounts of air   from your rectum or belching is normal.  - If a bowel prep was taken, you may not have a bowel movement for 1-3 days.    This is normal.  SYMPTOMS TO WATCH FOR AND REPORT TO YOUR PHYSICIAN:  1. Abdominal pain or bloating, other than gas cramps.  2. Chest pain.  3. Back pain.  4. Signs of infection such as: chills or fever occurring within 24 hours   after the procedure.  5. Rectal bleeding, which would show as bright red, maroon, or black stools.   (A tablespoon of blood from the rectum is not serious, especially if    hemorrhoids are present.)  6. Vomiting.  7. Weakness or dizziness.  GO DIRECTLY TO THE NEAREST EMERGENCY ROOM IF YOU HAVE ANY OF THE FOLLOWING:      Difficulty breathing              Chills and/or fever over 101 F   Persistent vomiting and/or vomiting blood   Severe abdominal pain   Severe chest pain   Black, tarry stools   Bleeding- more than one tablespoon   Any other symptom or condition that you feel may need urgent attention  Your doctor recommends these additional instructions:  If any biopsies were taken, your doctors clinic will contact you in 1 to 2   weeks with any results.  - Discharge patient to home (via wheelchair).   - High fiber diet.   - Continue present medications.   - Await pathology results.   - Repeat colonoscopy in 5 years for surveillance based on pathology results.     - Return to primary care physician as previously scheduled.   - Discharge patient to home (via wheelchair).   - High fiber diet.   - Continue present medications.   - Await pathology results.   - Repeat colonoscopy in 5 years for surveillance based on pathology results.     - Return to primary care physician as previously scheduled.  For questions, problems or results please call your physician Bran Reeves III, MD at Work:  [phone]  If you have any questions about the above instructions, call the GI   department at [phone] or call the endoscopy unit at [phone]   from 7am until 3 pm.  [facility] [place], EMERGENCY ROOM PHONE NUMBER:   [phone]  IF A COMPLICATION OR EMERGENCY SITUATION ARISES AND YOU ARE UNABLE TO REACH   YOUR PHYSICIAN - GO DIRECTLY TO THE EMERGENCY ROOM.  I have read or have had read to me these discharge instructions for my   procedure and have received a written copy.  I understand these   instructions and will follow-up with my physician if I have any questions.     __________________________________       _____________________________________  Nurse Signature                                           Patient/Designated   Responsible Party Signature  Bran Reeves III, MD  5/12/2022 10:08:54 AM  This report has been verified and signed electronically.  Dear patient,  As a result of recent federal legislation (The Federal Cures Act), you may   receive lab or pathology results from your procedure in your [facility]   account before your physician is able to contact you. Your physician or   their representative will relay the results to you with their   recommendations at their soonest availability.  Thank you,  PROVATION

## 2022-05-12 NOTE — H&P
Short Stay Endoscopy History and Physical    PCP - Juhi Wilburn MD    Procedure - Colonoscopy  ASA - 2  Mallampati - per anesthesia  History of Anesthesia problems - no  Family history Anesthesia problems -  no     HPI:  This is a 71 y.o.female here for evaluation of :Hx Colon Polyps     Reflux - no  Dysphagia - no  Abdominal pain - no  Diarrhea - no  Anemia - no  GI bleeding - no  Nausea and vomiting-no  Early satiety-no  aversion to sight or smell of food-no    ROS:  Constitutional: No fevers, chills, No weight loss  ENT: No allergies  CV: No chest pain  Pulm: No cough, No shortness of breath  Ophtho: No vision changes  GI: see HPI  Derm: No rash  Heme: No lymphadenopathy, No bruising  MSK: No arthritis  : No dysuria, No hematuria  Endo: No hot or cold intolerance  Neuro: No syncope, No seizure  Psych: No anxiety, No depression    Medical History:  Past Medical History:   Diagnosis Date    Allergic rhinitis     Anemia     Anxiety associated with depression     Autoimmune hepatitis     DJD (degenerative joint disease), lumbar     Essential hypertension     History of blood transfusion     Hyperlipidemia     Osteoarthritis of both hips     Osteoarthritis of right knee     Osteopenia     Sacroiliac joint disease     Tubular adenoma of colon     Vitamin D deficiency disease        Surgical History:  Past Surgical History:   Procedure Laterality Date    APPENDECTOMY      COLONOSCOPY N/A 8/2/2016    Procedure: COLONOSCOPY;  Surgeon: Bran Reeves III, MD;  Location: [facility];  Service: Endoscopy;  Laterality: N/A;    INGUINAL HERNIA REPAIR Left 12/2019    JOINT REPLACEMENT Right 10/2013    knee    ORIF FINGER FRACTURE Right     right middle finger    TOTAL ABDOMINAL HYSTERECTOMY         Family History:  Family History   Problem Relation Age of Onset    Colon cancer Mother 89    Hypertension Mother     Dementia Mother     Stroke Mother 70    Cataracts Mother     Hypertension Sister      Cancer Sister     Anesthesia problems Sister         unknown prob. with anesthesia    Kidney failure Sister         ESRD    Diabetes Sister     Hypertension Sister     No Known Problems Father     Heart attack Brother 48    Diabetes Daughter     Lupus Other        Social History:  Social History     Socioeconomic History    Marital status:    Tobacco Use    Smoking status: Never Smoker    Smokeless tobacco: Never Used   Substance and Sexual Activity    Alcohol use: Not Currently     Comment: once or month a year one glass of wine    Drug use: No    Sexual activity: Yes     Partners: Male   Social History Narrative    The patient is  to a smoker who smokes outside the home. 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 She has 2 adult children.        Allergies:   Review of patient's allergies indicates:   Allergen Reactions    No known drug allergies        Medications:   No current facility-administered medications on file prior to encounter.     Current Outpatient Medications on File Prior to Encounter   Medication Sig Dispense Refill    calcium-vitamin D (OSCAL) 250 (625)-125 mg-unit per tablet Take 1 tablet by mouth once daily.      cholecalciferol, vitamin D3, 125 mcg (5,000 unit) Tab Take 5,000 Units by mouth once daily.       ferrous sulfate 325 (65 FE) MG EC tablet Take 325 mg by mouth once daily.       lisinopriL-hydrochlorothiazide (PRINZIDE,ZESTORETIC) 10-12.5 mg per tablet TAKE 1 TABLET BY MOUTH EVERY DAY 90 tablet 3    predniSONE (DELTASONE) 5 MG tablet TAKE 0.5 TABLETS BY MOUTH ONCE DAILY. 45 tablet 2       Objective Findings:    Vital Signs:  Vitals:    05/12/22 0830   BP: (!) 138/92   Pulse: (!) 56   Resp: 18   Temp: 97.5 °F (36.4 °C)           Physical Exam:  General Appearance: Well appearing in no acute distress  Eyes:    No scleral icterus  ENT: Neck supple, Lips, mucosa, and tongue normal; teeth and gums normal  Lungs:  CTA bilaterally in anterior and posterior fields, no wheezes, no crackles.  Heart:  Regular rate, S1, S2 normal, no murmurs heard.  Abdomen: Soft, non tender, non distended with normal bowel sounds. No hepatosplenomegaly, ascites, or mass.  Extremities: No clubbing, cyanosis or edema  Skin: No rash    Labs:  Reviewed    Plan: Colonoscopy  I have explained the risks and benefits of endoscopy procedures to the patient including but not limited to bleeding, perforation, infection, and death. The patient wishes to proceed.

## 2022-05-12 NOTE — TRANSFER OF CARE
"Anesthesia Transfer of Care Note    Patient: Julianna Montemayor    Procedure(s) Performed: Procedure(s) (LRB):  COLONOSCOPY (N/A)    Patient location: PACU    Anesthesia Type: MAC    Transport from OR: Transported from OR on room air with adequate spontaneous ventilation    Post pain: adequate analgesia    Post assessment: no apparent anesthetic complications    Post vital signs: stable    Level of consciousness: responds to stimulation and awake    Nausea/Vomiting: no nausea/vomiting    Complications: none    Transfer of care protocol was followed      Last vitals:   Visit Vitals  BP (!) 138/92 (BP Location: Left arm, Patient Position: Lying)   Pulse (!) 56   Temp 36.4 °C (97.5 °F) (Temporal)   Resp 18   Ht 5' 5.5" (1.664 m)   SpO2 98%   Breastfeeding No   BMI 22.74 kg/m²     "

## 2022-05-12 NOTE — ANESTHESIA PREPROCEDURE EVALUATION
05/12/2022  Julianna Montemayor is a 71 y.o., female.    Anesthesia Evaluation    I have reviewed the Patient Summary Reports.    I have reviewed the Nursing Notes. I have reviewed the NPO Status.   I have reviewed the Medications.     Review of Systems  Anesthesia Hx:  No problems with previous Anesthesia   Denies Personal Hx of Anesthesia complications.   Hematology/Oncology:     Oncology Normal    -- Anemia:   EENT/Dental:EENT/Dental Normal   Cardiovascular:   Exercise tolerance: good Hypertension ECG has been reviewed.    Pulmonary:  Pulmonary Normal    Renal/:  Renal/ Normal     Hepatic/GI:   Liver Disease, Hepatitis (Autoimmune)    Musculoskeletal:   Arthritis     Neurological:  Neurology Normal    Endocrine:  Endocrine Normal    Dermatological:  Skin Normal    Psych:   Psychiatric History anxiety depression          Physical Exam  General:  Well nourished      Airway/Jaw/Neck:  Airway Findings: Mouth Opening: Normal   Tongue: Normal   Mallampati: II     Dental:  Dental Findings: In tact     Chest/Lungs:  Chest/Lungs Clear    Heart/Vascular:  Heart Findings: Normal Heart murmur: negative       Mental Status:  Mental Status Findings:  Cooperative, Alert and Oriented         Anesthesia Plan  Type of Anesthesia, risks & benefits discussed:  Anesthesia Type:  MAC    Patient's Preference:   Plan Factors:          Intra-op Monitoring Plan: standard ASA monitors  Intra-op Monitoring Plan Comments:   Post Op Pain Control Plan: multimodal analgesia  Post Op Pain Control Plan Comments:     Induction:   IV  Beta Blocker:  Patient is not currently on a Beta-Blocker (No further documentation required).       Informed Consent: Informed consent signed with the Patient and all parties understand the risks and agree with anesthesia plan.  All questions answered.  Anesthesia consent signed with patient.  ASA Score: 2      Day of Surgery Review of History & Physical: I have interviewed and examined the patient. I have reviewed the patient's H&P dated:        Anesthesia Plan Notes: Stress Dose Steroids        Ready For Surgery From Anesthesia Perspective.           Physical Exam  General: Well nourished    Airway:  Mallampati: II   Mouth Opening: Normal  Tongue: Normal    Dental:  In tact          Anesthesia Plan  Type of Anesthesia, risks & benefits discussed:    Anesthesia Type: MAC  Intra-op Monitoring Plan: standard ASA monitors  Post Op Pain Control Plan: multimodal analgesia  Induction:  IV  Informed Consent: Informed consent signed with the Patient and all parties understand the risks and agree with anesthesia plan.  All questions answered.   ASA Score: 2  Day of Surgery Review of History & Physical: I have interviewed and examined the patient. I have reviewed the patient's H&P dated:   Anesthesia Plan Notes: Stress Dose Steroids    Ready For Surgery From Anesthesia Perspective.       .

## 2022-05-12 NOTE — DISCHARGE SUMMARY
[facility] - Endoscopy (Hospital)  Discharge Note  Short Stay    Procedure(s) (LRB):  COLONOSCOPY (N/A)    OUTCOME: Patient tolerated treatment/procedure well without complication and is now ready for discharge.    DISPOSITION: Home or Self Care    FINAL DIAGNOSIS:  Colon Polyps                                      Diverticulosis    FOLLOWUP: In clinic    DISCHARGE INSTRUCTIONS:    Discharge Procedure Orders   Diet general     Activity as tolerated        TIME SPENT ON DISCHARGE: 20  minutes

## 2022-05-12 NOTE — PLAN OF CARE
S/P colonoscopy . Received report from RN, patient cole flynn. MD Reeves at bedside to discuss procedure and surveillance recommendations. Patient met  criteria for discharge. Discharge instructions given and patient verbalized understanding. IV removed, catheter intact. No distress noted at time of discharge. Patient placed in wheelchair and discharged to home with daughter.

## 2022-05-12 NOTE — ANESTHESIA POSTPROCEDURE EVALUATION
Anesthesia Post Evaluation    Patient: Julianna Montemayor    Procedure(s) Performed: Procedure(s) (LRB):  COLONOSCOPY (N/A)    Final Anesthesia Type: MAC      Patient location during evaluation: GI PACU  Patient participation: Yes- Able to Participate  Level of consciousness: awake and alert and oriented  Post-procedure vital signs: reviewed and stable  Pain management: adequate  Airway patency: patent  JANE mitigation strategies: Multimodal analgesia  PONV status at discharge: No PONV  Anesthetic complications: no      Cardiovascular status: blood pressure returned to baseline  Respiratory status: unassisted  Hydration status: euvolemic  Follow-up not needed.          Vitals Value Taken Time   BP  05/12/22 0957   Temp  05/12/22 0957   Pulse  05/12/22 0957   Resp  05/12/22 0957   SpO2  05/12/22 0957         No case tracking events are documented in the log.      Pain/Lalito Score: No data recorded

## 2022-05-13 VITALS
SYSTOLIC BLOOD PRESSURE: 108 MMHG | HEART RATE: 69 BPM | DIASTOLIC BLOOD PRESSURE: 71 MMHG | BODY MASS INDEX: 22.74 KG/M2 | TEMPERATURE: 98 F | RESPIRATION RATE: 18 BRPM | OXYGEN SATURATION: 100 % | HEIGHT: 66 IN

## 2022-05-17 LAB
FINAL PATHOLOGIC DIAGNOSIS: NORMAL
GROSS: NORMAL
Lab: NORMAL

## 2022-08-11 ENCOUNTER — LAB VISIT (OUTPATIENT)
Dept: LAB | Facility: HOSPITAL | Age: 72
End: 2022-08-11
Attending: FAMILY MEDICINE
Payer: MEDICARE

## 2022-08-11 ENCOUNTER — OFFICE VISIT (OUTPATIENT)
Dept: FAMILY MEDICINE | Facility: CLINIC | Age: 72
End: 2022-08-11
Payer: MEDICARE

## 2022-08-11 VITALS
HEART RATE: 89 BPM | TEMPERATURE: 97 F | WEIGHT: 136 LBS | HEIGHT: 66 IN | OXYGEN SATURATION: 96 % | SYSTOLIC BLOOD PRESSURE: 110 MMHG | BODY MASS INDEX: 21.86 KG/M2 | DIASTOLIC BLOOD PRESSURE: 70 MMHG

## 2022-08-11 DIAGNOSIS — E78.5 HYPERLIPIDEMIA, UNSPECIFIED HYPERLIPIDEMIA TYPE: Chronic | ICD-10-CM

## 2022-08-11 DIAGNOSIS — Z01.419 WELL FEMALE EXAM WITH ROUTINE GYNECOLOGICAL EXAM: ICD-10-CM

## 2022-08-11 DIAGNOSIS — D84.9 IMMUNOSUPPRESSED STATUS: ICD-10-CM

## 2022-08-11 DIAGNOSIS — I10 ESSENTIAL HYPERTENSION: Primary | Chronic | ICD-10-CM

## 2022-08-11 DIAGNOSIS — I10 ESSENTIAL HYPERTENSION: Chronic | ICD-10-CM

## 2022-08-11 LAB
ALBUMIN SERPL BCP-MCNC: 3.6 G/DL (ref 3.5–5.2)
ALP SERPL-CCNC: 38 U/L (ref 55–135)
ALT SERPL W/O P-5'-P-CCNC: 9 U/L (ref 10–44)
ANION GAP SERPL CALC-SCNC: 7 MMOL/L (ref 8–16)
AST SERPL-CCNC: 18 U/L (ref 10–40)
BASOPHILS # BLD AUTO: 0.02 K/UL (ref 0–0.2)
BASOPHILS NFR BLD: 0.4 % (ref 0–1.9)
BILIRUB SERPL-MCNC: 0.8 MG/DL (ref 0.1–1)
BUN SERPL-MCNC: 22 MG/DL (ref 8–23)
CALCIUM SERPL-MCNC: 9.5 MG/DL (ref 8.7–10.5)
CHLORIDE SERPL-SCNC: 104 MMOL/L (ref 95–110)
CHOLEST SERPL-MCNC: 231 MG/DL (ref 120–199)
CHOLEST/HDLC SERPL: 3.6 {RATIO} (ref 2–5)
CO2 SERPL-SCNC: 27 MMOL/L (ref 23–29)
CREAT SERPL-MCNC: 1.1 MG/DL (ref 0.5–1.4)
DIFFERENTIAL METHOD: ABNORMAL
EOSINOPHIL # BLD AUTO: 0.1 K/UL (ref 0–0.5)
EOSINOPHIL NFR BLD: 1.7 % (ref 0–8)
ERYTHROCYTE [DISTWIDTH] IN BLOOD BY AUTOMATED COUNT: 13.1 % (ref 11.5–14.5)
EST. GFR  (NO RACE VARIABLE): 53.1 ML/MIN/1.73 M^2
GLUCOSE SERPL-MCNC: 76 MG/DL (ref 70–110)
HCT VFR BLD AUTO: 33.6 % (ref 37–48.5)
HDLC SERPL-MCNC: 65 MG/DL (ref 40–75)
HDLC SERPL: 28.1 % (ref 20–50)
HGB BLD-MCNC: 10.5 G/DL (ref 12–16)
IMM GRANULOCYTES # BLD AUTO: 0.02 K/UL (ref 0–0.04)
IMM GRANULOCYTES NFR BLD AUTO: 0.4 % (ref 0–0.5)
LDLC SERPL CALC-MCNC: 150.2 MG/DL (ref 63–159)
LYMPHOCYTES # BLD AUTO: 1.7 K/UL (ref 1–4.8)
LYMPHOCYTES NFR BLD: 35.8 % (ref 18–48)
MCH RBC QN AUTO: 29.2 PG (ref 27–31)
MCHC RBC AUTO-ENTMCNC: 31.3 G/DL (ref 32–36)
MCV RBC AUTO: 93 FL (ref 82–98)
MONOCYTES # BLD AUTO: 0.5 K/UL (ref 0.3–1)
MONOCYTES NFR BLD: 9.8 % (ref 4–15)
NEUTROPHILS # BLD AUTO: 2.5 K/UL (ref 1.8–7.7)
NEUTROPHILS NFR BLD: 51.9 % (ref 38–73)
NONHDLC SERPL-MCNC: 166 MG/DL
NRBC BLD-RTO: 0 /100 WBC
PLATELET # BLD AUTO: 225 K/UL (ref 150–450)
PMV BLD AUTO: 9.7 FL (ref 9.2–12.9)
POTASSIUM SERPL-SCNC: 3.8 MMOL/L (ref 3.5–5.1)
PROT SERPL-MCNC: 7.3 G/DL (ref 6–8.4)
RBC # BLD AUTO: 3.6 M/UL (ref 4–5.4)
SODIUM SERPL-SCNC: 138 MMOL/L (ref 136–145)
TRIGL SERPL-MCNC: 79 MG/DL (ref 30–150)
TSH SERPL DL<=0.005 MIU/L-ACNC: 0.56 UIU/ML (ref 0.4–4)
WBC # BLD AUTO: 4.8 K/UL (ref 3.9–12.7)

## 2022-08-11 PROCEDURE — 99999 PR PBB SHADOW E&M-EST. PATIENT-LVL III: ICD-10-PCS | Mod: PBBFAC,,, | Performed by: FAMILY MEDICINE

## 2022-08-11 PROCEDURE — G0101 PR CA SCREEN;PELVIC/BREAST EXAM: ICD-10-PCS | Mod: S$PBB,,, | Performed by: FAMILY MEDICINE

## 2022-08-11 PROCEDURE — 36415 COLL VENOUS BLD VENIPUNCTURE: CPT | Mod: PO | Performed by: FAMILY MEDICINE

## 2022-08-11 PROCEDURE — 85025 COMPLETE CBC W/AUTO DIFF WBC: CPT | Performed by: FAMILY MEDICINE

## 2022-08-11 PROCEDURE — 80061 LIPID PANEL: CPT | Performed by: FAMILY MEDICINE

## 2022-08-11 PROCEDURE — 99214 PR OFFICE/OUTPT VISIT, EST, LEVL IV, 30-39 MIN: ICD-10-PCS | Mod: 25,S$PBB,, | Performed by: FAMILY MEDICINE

## 2022-08-11 PROCEDURE — 99213 OFFICE O/P EST LOW 20 MIN: CPT | Mod: PBBFAC,PO,25 | Performed by: FAMILY MEDICINE

## 2022-08-11 PROCEDURE — 84443 ASSAY THYROID STIM HORMONE: CPT | Performed by: FAMILY MEDICINE

## 2022-08-11 PROCEDURE — 80053 COMPREHEN METABOLIC PANEL: CPT | Performed by: FAMILY MEDICINE

## 2022-08-11 PROCEDURE — 99214 OFFICE O/P EST MOD 30 MIN: CPT | Mod: 25,S$PBB,, | Performed by: FAMILY MEDICINE

## 2022-08-11 PROCEDURE — 99999 PR PBB SHADOW E&M-EST. PATIENT-LVL III: CPT | Mod: PBBFAC,,, | Performed by: FAMILY MEDICINE

## 2022-08-11 PROCEDURE — G0101 CA SCREEN;PELVIC/BREAST EXAM: HCPCS | Mod: PBBFAC,PO | Performed by: FAMILY MEDICINE

## 2022-08-11 PROCEDURE — G0101 CA SCREEN;PELVIC/BREAST EXAM: HCPCS | Mod: S$PBB,,, | Performed by: FAMILY MEDICINE

## 2022-08-11 NOTE — PROGRESS NOTES
CHIEF COMPLAINT:  This is a 71-year-old female here for follow-up of chronic medical conditions and for preventive health exam.     SUBJECTIVE: Patient is doing well without complaints except for runny nose and postnasal drip. The patient's blood pressure is controlled on lisinopril HCT 10-12.5 mg daily. Her BP today is 110/70. She is taking Imuran and oral steroids for autoimmune hepatitis.  The patient has osteopenia but refuses bisphosphonates.       Eye exam July 2021. Mammogram December 2015 (refuses mammograms). Bone DEXA scan August 2021.  Colonoscopy May 2022 due again May 2027. Tdap April 2012. Pneumovax April 2012. Flu vaccine, September 2020.  Prevnar vaccine December 2015. COVID 19 vaccine February, March, December 2021.     ROS:  GENERAL: Patient denies fever, chills, night sweats. Patient denies weight loss. Patient denies anorexia, fatigue, weakness or swollen glands.  SKIN: Patient denies rash or hair loss.  HEENT: Patient denies sore throat, ear pain, hearing loss, nasal congestion, or runny nose. Patient denies visual disturbance, eye irritation or discharge.  LUNGS: Patient denies cough, wheeze or hemoptysis.  CARDIOVASCULAR: Patient denies chest pain, shortness of breath, palpitations, syncope or lower extremity edema.  GI: Patient denies abdominal pain, nausea, vomiting, diarrhea, constipation, blood in stool or melena.  GENITOURINARY: Patient denies pelvic pain, vaginal discharge, itch or odor. Patient denies irregular vaginal bleeding. Patient denies dysuria, frequency, hematuria, nocturia, urgency or incontinence.  BREASTS: Patient denies breast pain, mass or nipple discharge.  MUSCULOSKELETAL: Patient denies joint swelling, redness or warmth. Positive for joint pain.  NEUROLOGIC: Patient denies headache, vertigo, paresthesias, weakness in limb, dysarthria, dysphagia or abnormality of gait.  PSYCHIATRIC: Patient denies anxiety, depression, or memory loss.     OBJECTIVE:   GENERAL:  Well-developed well-nourished black female alert and oriented x3, in no acute distress. Memory, judgment and cognition without deficit.  SKIN: Clear without rash. Normal color and tone. Lipoma left upper extremity. Ganglion cyst, left dorsal wrist. Small, movable nodule right dorsal foot.  Dry flaky skin, left foot.  HEENT: Eyes: Clear conjunctivae. Pupils equal reactive to light and accommodation. Ears: Clear TMs. Clear canals. Nose: Moderate nasal congestion, left greater than right. Pharynx: Without injection or exudates.  NECK: Supple, normal range of motion. No masses, nodes or enlarged thyroid. No JVD. Carotids 2+ and equal. No bruits.  LUNGS: Clear to auscultation. Normal respiratory effort.  CARDIOVASCULAR: Regular rhythm, normal S1, S2 without murmur, gallop or rub.  BACK: No CVA or spinal tenderness.  BREASTS: No masses, tenderness or nipple discharge.  ABDOMEN: Normal appearance. Active bowel sounds. Soft, nontender without mass or organomegaly. No rebound or guarding.  EXTREMITIES: Without cyanosis, clubbing or edema. Distal pulses 2+ and equal. Normal range of motion in all extremities. No joint effusion, erythema or warmth.  NEUROLOGIC: Cranial nerves II through XII slightly overweight without deficit. Motor strength equal bilaterally. Sensation normal to touch. Deep tendon reflexes 2+ and equal. Gait without abnormality. No tremor. Negative cerebellar signs.  PELVIC: External: Without lesions or inflammation.  Urethral meatus normal size and shape.  Vaginal: Clear.  No discharge.  Atrophic changes. Slight anterior wall prolapse. Cuff intact. Bimanual:  No palpable tenderness.  No palpable masses.  Rectovaginal: Confirms.  Normal anal sphincter tone.  Heme-negative stool x2.    ASSESSMENT:  1. Essential hypertension    2. Hyperlipidemia, unspecified hyperlipidemia type    3. Immunosuppressed status    4. Well female exam with routine gynecological exam      PLAN:  1. Exercise regularly.  2.  Age-appropriate counseling.  3. Fasting lab.  4. Refill medications as needed.  5. Tried nonsedating antihistamine such as Claritin.  6. Follow-up annually.      30 minutes of total time spent on the encounter, which includes face to face time and non-face to face time preparing to see the patient (eg, review of tests), Obtaining and/or reviewing separately obtained history, Documenting clinical information in the electronic or other health record, Independently interpreting results (not separately reported) and communicating results to the patient/family/caregiver, or Care coordination (not separately reported).     This note is generated with speech recognition software and is subject to transcription error and sound alike phrases that may be missed by proofreading.

## 2022-10-18 ENCOUNTER — TELEPHONE (OUTPATIENT)
Dept: ADMINISTRATIVE | Facility: HOSPITAL | Age: 72
End: 2022-10-18
Payer: MEDICARE

## 2022-11-07 ENCOUNTER — TELEPHONE (OUTPATIENT)
Dept: GASTROENTEROLOGY | Facility: CLINIC | Age: 72
End: 2022-11-07
Payer: MEDICARE

## 2022-11-07 NOTE — TELEPHONE ENCOUNTER
----- Message from Marianne German sent at 11/7/2022  8:44 AM CST -----  Contact: Julianna Levine is calling with questions and concern on if she can see Dr. Reeves before he leave. Please give her a call back at [phone]    Thanks  NADER

## 2022-11-10 ENCOUNTER — TELEPHONE (OUTPATIENT)
Dept: GASTROENTEROLOGY | Facility: CLINIC | Age: 72
End: 2022-11-10
Payer: MEDICARE

## 2022-11-10 ENCOUNTER — OFFICE VISIT (OUTPATIENT)
Dept: GASTROENTEROLOGY | Facility: CLINIC | Age: 72
End: 2022-11-10
Payer: MEDICARE

## 2022-11-10 VITALS
HEART RATE: 67 BPM | WEIGHT: 131.81 LBS | HEIGHT: 66 IN | DIASTOLIC BLOOD PRESSURE: 78 MMHG | SYSTOLIC BLOOD PRESSURE: 100 MMHG | BODY MASS INDEX: 21.18 KG/M2

## 2022-11-10 DIAGNOSIS — K75.4 AUTOIMMUNE HEPATITIS: Primary | ICD-10-CM

## 2022-11-10 PROCEDURE — 99213 PR OFFICE/OUTPT VISIT, EST, LEVL III, 20-29 MIN: ICD-10-PCS | Mod: S$PBB,,, | Performed by: INTERNAL MEDICINE

## 2022-11-10 PROCEDURE — 99213 OFFICE O/P EST LOW 20 MIN: CPT | Mod: PBBFAC | Performed by: INTERNAL MEDICINE

## 2022-11-10 PROCEDURE — 99999 PR PBB SHADOW E&M-EST. PATIENT-LVL III: CPT | Mod: PBBFAC,,, | Performed by: INTERNAL MEDICINE

## 2022-11-10 PROCEDURE — 99213 OFFICE O/P EST LOW 20 MIN: CPT | Mod: S$PBB,,, | Performed by: INTERNAL MEDICINE

## 2022-11-10 PROCEDURE — 99999 PR PBB SHADOW E&M-EST. PATIENT-LVL III: ICD-10-PCS | Mod: PBBFAC,,, | Performed by: INTERNAL MEDICINE

## 2022-11-10 NOTE — PROGRESS NOTES
Subjective:       Patient ID: Julianna Montemayor is a 72 y.o. female.    Chief Complaint: Follow-up ( Pt. said, she wants to make her last visit with Dr. Reeves before his [facility], and also for him to recommend to her who she should be seeing?)    This is a long-term patient of mine initially seen by me in approximately 2009 because of abnormal function studies.  Diagnosis was made of autoimmune hepatitis on clinical grounds in eventually confirmed by liver biopsy.  She is been on therapy with Imuran and prednisone since that time, in his well controlled with regimen including very low-dose prednisone along with the azathioprine.  The last lab evaluations were done in August of this year and had remained well in the normal range.  Her care will now be transitioning over to the hepatology Department for ongoing follow-up; had attempted to do this in March of this year but for some reason never took place.    Review of Systems   Constitutional:  Positive for unexpected weight change. Negative for activity change, appetite change, chills, diaphoresis, fatigue and fever.   HENT:  Negative for congestion, ear discharge, ear pain, hearing loss, nosebleeds, postnasal drip and tinnitus.    Eyes:  Negative for photophobia and visual disturbance.   Respiratory:  Negative for apnea, cough, choking, chest tightness, shortness of breath and wheezing.    Cardiovascular:  Negative for chest pain, palpitations and leg swelling.   Gastrointestinal:  Negative for abdominal distention, abdominal pain, anal bleeding, blood in stool, constipation, diarrhea, nausea, rectal pain and vomiting.   Genitourinary:  Negative for difficulty urinating, dyspareunia, dysuria, flank pain, frequency, hematuria, menstrual problem, pelvic pain, urgency, vaginal bleeding and vaginal discharge.   Musculoskeletal:  Negative for arthralgias, back pain, gait problem, joint swelling, myalgias and neck stiffness.   Skin:  Positive for rash. Negative for  pallor.        Erythematous pimple on forearm.   Neurological:  Negative for dizziness, tremors, seizures, syncope, speech difficulty, weakness, numbness and headaches.   Hematological:  Negative for adenopathy.   Psychiatric/Behavioral:  Negative for agitation, confusion, hallucinations, sleep disturbance and suicidal ideas.      Objective:      Physical Exam  Vitals reviewed.       Assessment:     1. Autoimmune hepatitis  Ambulatory referral/consult to Hepatology           Plan:   Transition care to hepatology.

## 2022-11-10 NOTE — TELEPHONE ENCOUNTER
Katie Tovar, Dr. Reeves has referred this patient to Dr. Santos, can you assist with appt please?, thanks in advance.

## 2022-11-21 NOTE — TELEPHONE ENCOUNTER
Ms. Levine is calling to get a refill on the medication predniSONE (DELTASONE) 5 MG tablet, She states she does not see her knew doctor until January but she cant be out of the medicine for that long.

## 2022-11-22 RX ORDER — PREDNISONE 5 MG/1
2.5 TABLET ORAL DAILY
Qty: 45 TABLET | Refills: 2 | Status: SHIPPED | OUTPATIENT
Start: 2022-11-22 | End: 2023-01-25 | Stop reason: SDUPTHER

## 2022-12-06 ENCOUNTER — TELEPHONE (OUTPATIENT)
Dept: ADMINISTRATIVE | Facility: HOSPITAL | Age: 72
End: 2022-12-06
Payer: MEDICARE

## 2023-01-11 ENCOUNTER — PES CALL (OUTPATIENT)
Dept: ADMINISTRATIVE | Facility: CLINIC | Age: 73
End: 2023-01-11
Payer: MEDICARE

## 2023-01-20 ENCOUNTER — OFFICE VISIT (OUTPATIENT)
Dept: HOME HEALTH SERVICES | Facility: CLINIC | Age: 73
End: 2023-01-20
Payer: MEDICARE

## 2023-01-20 VITALS
SYSTOLIC BLOOD PRESSURE: 121 MMHG | TEMPERATURE: 98 F | HEIGHT: 66 IN | BODY MASS INDEX: 21.21 KG/M2 | WEIGHT: 132 LBS | OXYGEN SATURATION: 98 % | HEART RATE: 61 BPM | DIASTOLIC BLOOD PRESSURE: 79 MMHG

## 2023-01-20 DIAGNOSIS — E55.9 VITAMIN D DEFICIENCY DISEASE: ICD-10-CM

## 2023-01-20 DIAGNOSIS — I10 ESSENTIAL HYPERTENSION: Chronic | ICD-10-CM

## 2023-01-20 DIAGNOSIS — J30.1 SEASONAL ALLERGIC RHINITIS DUE TO POLLEN: ICD-10-CM

## 2023-01-20 DIAGNOSIS — E78.5 HYPERLIPIDEMIA, UNSPECIFIED HYPERLIPIDEMIA TYPE: Chronic | ICD-10-CM

## 2023-01-20 DIAGNOSIS — Z00.00 ENCOUNTER FOR PREVENTIVE HEALTH EXAMINATION: Primary | ICD-10-CM

## 2023-01-20 DIAGNOSIS — K75.4 AUTOIMMUNE HEPATITIS: Chronic | ICD-10-CM

## 2023-01-20 DIAGNOSIS — D84.9 IMMUNOSUPPRESSED STATUS: ICD-10-CM

## 2023-01-20 DIAGNOSIS — M17.11 OSTEOARTHRITIS OF RIGHT KNEE, UNSPECIFIED OSTEOARTHRITIS TYPE: ICD-10-CM

## 2023-01-20 DIAGNOSIS — D64.9 ANEMIA, UNSPECIFIED TYPE: ICD-10-CM

## 2023-01-20 DIAGNOSIS — M85.80 OSTEOPENIA, UNSPECIFIED LOCATION: ICD-10-CM

## 2023-01-20 DIAGNOSIS — N18.31 CHRONIC KIDNEY DISEASE, STAGE 3A: ICD-10-CM

## 2023-01-20 DIAGNOSIS — F41.8 ANXIETY ASSOCIATED WITH DEPRESSION: ICD-10-CM

## 2023-01-20 PROCEDURE — G0439 PR MEDICARE ANNUAL WELLNESS SUBSEQUENT VISIT: ICD-10-PCS | Mod: S$GLB,,, | Performed by: NURSE PRACTITIONER

## 2023-01-20 PROCEDURE — G0439 PPPS, SUBSEQ VISIT: HCPCS | Mod: S$GLB,,, | Performed by: NURSE PRACTITIONER

## 2023-01-20 NOTE — Clinical Note
Medicare awv complete. Health maintenance:  covid 19 4th vaccine, tetanus vaccine, shingles vaccine due-encouraged patient to obtain.

## 2023-01-20 NOTE — PROGRESS NOTES
"Julianna Montemayor presented for Medicare AW today. The following components were reviewed and updated:    Medical history  Family History  Social history  Allergies and Current Medications  Health Risk Assessment  Health Maintenance  Care Team    **See Completed Assessments for Annual Wellness visit with in the encounter summary    The following assessments were completed:  Depression Screening  Cognitive function Screening      Timed Get Up Test  Whisper Test    Vitals:    01/20/23 1020   BP: 121/79   Pulse: 61   Temp: 97.9 °F (36.6 °C)   TempSrc: Temporal   SpO2: 98%   Weight: 59.9 kg (132 lb)   Height: 5' 5.5" (1.664 m)     Body mass index is 21.63 kg/m².   ]    Physical Exam  Vitals reviewed.   Constitutional:       Appearance: Normal appearance.      Comments: Very pleasant, soft spoken   HENT:      Head: Normocephalic and atraumatic.   Cardiovascular:      Rate and Rhythm: Normal rate and regular rhythm.      Pulses: Normal pulses.      Heart sounds: Normal heart sounds.   Pulmonary:      Effort: Pulmonary effort is normal.      Breath sounds: Normal breath sounds.   Musculoskeletal:         General: Normal range of motion.      Cervical back: Normal range of motion and neck supple.   Skin:     General: Skin is warm and dry.   Neurological:      Mental Status: She is alert and oriented to person, place, and time.   Psychiatric:         Mood and Affect: Mood normal.         Behavior: Behavior normal.        Outpatient Medications Marked as Taking for the 1/20/23 encounter (Office Visit) with LEATHA Ferreira   Medication Sig Dispense Refill    azaTHIOprine (IMURAN) 50 mg Tab TAKE 1 TABLET BY MOUTH EVERY DAY 90 tablet 4    calcium carb/vit D3/minerals (CALCIUM-VITAMIN D ORAL) Take 1 tablet by mouth once daily. Calcium 600 and vitamin d 800IU/day      cholecalciferol, vitamin D3, 125 mcg (5,000 unit) Tab Take 1,000 Units by mouth once daily.      lisinopriL-hydrochlorothiazide (PRINZIDE,ZESTORETIC) 10-12.5 mg " per tablet TAKE 1 TABLET BY MOUTH EVERY DAY 90 tablet 3    predniSONE (DELTASONE) 5 MG tablet Take 0.5 tablets (2.5 mg total) by mouth once daily. 45 tablet 2    [DISCONTINUED] calcium-vitamin D (OSCAL) 250 (625)-125 mg-unit per tablet Take 1 tablet by mouth once daily.          Diagnoses and health risks identified today and associated recommendations/orders:  1. Encounter for preventive health examination  Medicare awv complete. Health maintenance:  covid 19 4th vaccine, tetanus vaccine, shingles vaccine due-encouraged patient to obtain.     2. Autoimmune hepatitis  Chronic. On 2.5 mg prednisone daily and 50 mg Imuran daily. F/u with hepatology Dr. Madison Santos.     3. Immunosuppressed status  Chronic. No acute issues. See #2.  On 2.5 mg prednisone daily and 50 mg Imuran daily. F/u with hepatology Dr. Madison Santos.     4. Chronic kidney disease, stage 3a   Latest Reference Range & Units 01/14/21 09:50 07/29/21 10:56 08/11/22 11:15   eGFR >60 mL/min/1.73 m^2   53.1 !   eGFR if non African American >60 mL/min/1.73 m^2 56.4 ! 45.3 !    eGFR if African American >60 mL/min/1.73 m^2 >60.0 52.2 !    !: Data is abnormal  Chronic and stable. No acute issues. Continue current management. Recommend avoid nsaids and other nephrotoxic medications. Follow up with PCP/nephrologist.      5. Essential hypertension  Chronic and stable on current management. See med list above. Recommend low sodium diet. Follow up with PCP.       6. Hyperlipidemia, unspecified hyperlipidemia type  Lab Results   Component Value Date    CHOL 231 (H) 08/11/2022    CHOL 244 (H) 07/29/2021    CHOL 236 (H) 07/29/2020     Lab Results   Component Value Date    HDL 65 08/11/2022    HDL 72 07/29/2021    HDL 86 (H) 07/29/2020     Lab Results   Component Value Date    LDLCALC 150.2 08/11/2022    LDLCALC 154.0 07/29/2021    LDLCALC 137.0 07/29/2020     Lab Results   Component Value Date    TRIG 79 08/11/2022    TRIG 90 07/29/2021    TRIG 65 07/29/2020     Lab Results    Component Value Date    CHOLHDL 28.1 08/11/2022    CHOLHDL 29.5 07/29/2021    CHOLHDL 36.4 07/29/2020   Chronic and stable. Cholesterol elevated and ldlcalc 150. Goal likely less than 130. Recommend heart healthy diet and exercise along with fish oil and metamucil.  F/u with pcp.     7. Anxiety associated with depression  Chronic and stable on current management. See med list above. Follow up with PCP.      8. Seasonal allergic rhinitis due to pollen  Chronic and stable on current management. See med list above. Follow up with PCP.      9. Anemia, unspecified type   Latest Reference Range & Units 08/11/22 11:15   Hemoglobin 12.0 - 16.0 g/dL 10.5 (L)   Hematocrit 37.0 - 48.5 % 33.6 (L)   (L): Data is abnormally low   Latest Reference Range & Units 01/14/21 09:50 07/29/21 10:56   Hemoglobin 12.0 - 16.0 g/dL 11.0 (L) 11.2 (L)   Hematocrit 37.0 - 48.5 % 37.6 36.7 (L)   (L): Data is abnormally low  Chronic and stable on current management. See med list above. Follow up with PCP.      10. Vitamin D deficiency disease  Chronic and stable on current management. On vitamin d. See med list above. Follow up with PCP.      11. Osteopenia, unspecified location  Chronic and stable on current management. Continue vitamin d, calcium, and weight bearing exercise. See med list above. Patient declines bisphosphonates per pcp. Next dexa 2026. Follow up with PCP.       12. Osteoarthritis of right knee, unspecified osteoarthritis type  Chronic and stable on current management. See med list above. Follow up with PCP.                Provided Julianna with a 5-10 year written screening schedule and personal prevention plan. Recommendations were developed using the USPSTF age appropriate recommendations. Education, counseling, and referrals were provided as needed.  After Visit Summary printed and given to patient which includes a list of additional screenings\tests needed.    Follow up in about 1 year (around 1/20/2024) for annual wellness  visit.      Anay Thibodeaux, CATALINAP    I offered to discuss advanced care planning, including how to pick a person who would make decisions for you if you were unable to make them for yourself, called a health care power of , and what kind of decisions you might make such as use of life sustaining treatments such as ventilators and tube feeding when faced with a life limiting illness recorded on a living will that they will need to know. (How you want to be cared for as you near the end of your natural life)     X Patient is interested in learning more about how to make advanced directives.  I provided them paperwork and offered to discuss this with them.

## 2023-01-20 NOTE — PATIENT INSTRUCTIONS
Counseling and Referral of Other Preventative  (Italic type indicates deductible and co-insurance are waived)    Patient Name: Julianna Montemayor  Today's Date: 1/20/2023    Health Maintenance       Date Due Completion Date    Shingles Vaccine (1 of 2) Never done ---    COVID-19 Vaccine (4 - Booster for Moderna series) 01/29/2022 12/4/2021    TETANUS VACCINE 04/27/2022 4/27/2012 (Done)    Override on 4/27/2012: Done    DEXA Scan 08/19/2026 8/19/2021    Override on 5/8/2012: Done    Override on 11/6/2009: Done    Colorectal Cancer Screening 05/12/2027 5/12/2022    Lipid Panel 08/11/2027 8/11/2022        No orders of the defined types were placed in this encounter.    The following information is provided to all patients.  This information is to help you find resources for any of the problems found today that may be affecting your health:                Living healthy guide: www.[facility].[place].[facility]      Understanding Diabetes: www.diabetes.org      Eating healthy: www.cdc.gov/healthyweight      [facility] home safety checklist: www.cdc.gov/steadi/patient.html      Agency on Aging: www.goea.[place].gov      Alcoholics anonymous (AA): www.aa.org      Physical Activity: www.romy.nih.gov/rf2arvi      Tobacco use: www.quitwithusla.org

## 2023-01-25 ENCOUNTER — LAB VISIT (OUTPATIENT)
Dept: LAB | Facility: HOSPITAL | Age: 73
End: 2023-01-25
Attending: INTERNAL MEDICINE
Payer: MEDICARE

## 2023-01-25 ENCOUNTER — OFFICE VISIT (OUTPATIENT)
Dept: HEPATOLOGY | Facility: CLINIC | Age: 73
End: 2023-01-25
Payer: MEDICARE

## 2023-01-25 VITALS
HEART RATE: 60 BPM | HEIGHT: 66 IN | BODY MASS INDEX: 21.76 KG/M2 | DIASTOLIC BLOOD PRESSURE: 82 MMHG | WEIGHT: 135.38 LBS | SYSTOLIC BLOOD PRESSURE: 120 MMHG

## 2023-01-25 DIAGNOSIS — K75.4 AUTOIMMUNE HEPATITIS: ICD-10-CM

## 2023-01-25 LAB
ALBUMIN SERPL BCP-MCNC: 3.8 G/DL (ref 3.5–5.2)
ALP SERPL-CCNC: 45 U/L (ref 55–135)
ALT SERPL W/O P-5'-P-CCNC: 11 U/L (ref 10–44)
ANION GAP SERPL CALC-SCNC: 9 MMOL/L (ref 8–16)
AST SERPL-CCNC: 17 U/L (ref 10–40)
BASOPHILS # BLD AUTO: 0.04 K/UL (ref 0–0.2)
BASOPHILS NFR BLD: 0.8 % (ref 0–1.9)
BILIRUB SERPL-MCNC: 0.5 MG/DL (ref 0.1–1)
BUN SERPL-MCNC: 22 MG/DL (ref 8–23)
CALCIUM SERPL-MCNC: 9.3 MG/DL (ref 8.7–10.5)
CHLORIDE SERPL-SCNC: 106 MMOL/L (ref 95–110)
CO2 SERPL-SCNC: 27 MMOL/L (ref 23–29)
CREAT SERPL-MCNC: 1.1 MG/DL (ref 0.5–1.4)
DIFFERENTIAL METHOD: ABNORMAL
EOSINOPHIL # BLD AUTO: 0.1 K/UL (ref 0–0.5)
EOSINOPHIL NFR BLD: 2.1 % (ref 0–8)
ERYTHROCYTE [DISTWIDTH] IN BLOOD BY AUTOMATED COUNT: 13.3 % (ref 11.5–14.5)
EST. GFR  (NO RACE VARIABLE): 53 ML/MIN/1.73 M^2
GLUCOSE SERPL-MCNC: 83 MG/DL (ref 70–110)
HCT VFR BLD AUTO: 34.5 % (ref 37–48.5)
HGB BLD-MCNC: 10.9 G/DL (ref 12–16)
IMM GRANULOCYTES # BLD AUTO: 0.01 K/UL (ref 0–0.04)
IMM GRANULOCYTES NFR BLD AUTO: 0.2 % (ref 0–0.5)
LYMPHOCYTES # BLD AUTO: 1.7 K/UL (ref 1–4.8)
LYMPHOCYTES NFR BLD: 32.2 % (ref 18–48)
MCH RBC QN AUTO: 30 PG (ref 27–31)
MCHC RBC AUTO-ENTMCNC: 31.6 G/DL (ref 32–36)
MCV RBC AUTO: 95 FL (ref 82–98)
MONOCYTES # BLD AUTO: 0.6 K/UL (ref 0.3–1)
MONOCYTES NFR BLD: 11.5 % (ref 4–15)
NEUTROPHILS # BLD AUTO: 2.8 K/UL (ref 1.8–7.7)
NEUTROPHILS NFR BLD: 53.2 % (ref 38–73)
NRBC BLD-RTO: 0 /100 WBC
PLATELET # BLD AUTO: 189 K/UL (ref 150–450)
PMV BLD AUTO: 9.2 FL (ref 9.2–12.9)
POTASSIUM SERPL-SCNC: 4.4 MMOL/L (ref 3.5–5.1)
PROT SERPL-MCNC: 7.3 G/DL (ref 6–8.4)
RBC # BLD AUTO: 3.63 M/UL (ref 4–5.4)
SODIUM SERPL-SCNC: 142 MMOL/L (ref 136–145)
WBC # BLD AUTO: 5.21 K/UL (ref 3.9–12.7)

## 2023-01-25 PROCEDURE — 99999 PR PBB SHADOW E&M-EST. PATIENT-LVL III: ICD-10-PCS | Mod: PBBFAC,,, | Performed by: INTERNAL MEDICINE

## 2023-01-25 PROCEDURE — 99213 PR OFFICE/OUTPT VISIT, EST, LEVL III, 20-29 MIN: ICD-10-PCS | Mod: S$PBB,,, | Performed by: INTERNAL MEDICINE

## 2023-01-25 PROCEDURE — 99213 OFFICE O/P EST LOW 20 MIN: CPT | Mod: PBBFAC | Performed by: INTERNAL MEDICINE

## 2023-01-25 PROCEDURE — 80053 COMPREHEN METABOLIC PANEL: CPT | Performed by: INTERNAL MEDICINE

## 2023-01-25 PROCEDURE — 99213 OFFICE O/P EST LOW 20 MIN: CPT | Mod: S$PBB,,, | Performed by: INTERNAL MEDICINE

## 2023-01-25 PROCEDURE — 85025 COMPLETE CBC W/AUTO DIFF WBC: CPT | Performed by: INTERNAL MEDICINE

## 2023-01-25 PROCEDURE — 99999 PR PBB SHADOW E&M-EST. PATIENT-LVL III: CPT | Mod: PBBFAC,,, | Performed by: INTERNAL MEDICINE

## 2023-01-25 PROCEDURE — 36415 COLL VENOUS BLD VENIPUNCTURE: CPT | Performed by: INTERNAL MEDICINE

## 2023-01-25 RX ORDER — AZATHIOPRINE 50 MG/1
50 TABLET ORAL DAILY
Qty: 90 TABLET | Refills: 3 | Status: SHIPPED | OUTPATIENT
Start: 2023-01-25 | End: 2024-02-07

## 2023-01-25 RX ORDER — PREDNISONE 2.5 MG/1
2.5 TABLET ORAL DAILY
Qty: 90 TABLET | Refills: 3 | Status: SHIPPED | OUTPATIENT
Start: 2023-01-25 | End: 2024-02-07

## 2023-01-25 NOTE — PROGRESS NOTES
Subjective:     Julianna Montemayor is here for evaluation of Autoimmune hepatitis      HPI  Julianna Montemayor is a long-time patient of Dr. Reeves who has retired.  She is transitioning to me for continue care of autoimmune hepatitis.  He has been managing her for autoimmune hepatitis for many years.  She is been on low-dose azathioprine and prednisone without significant complications.  I do not see any biopsy data available but her liver tests have been normal and she seems have been stable on this dose for a long time.  She overall is feeling well without any significant issues.    Review of Systems    Objective:     Physical Exam  Vitals reviewed.   Constitutional:       General: She is not in acute distress.     Appearance: She is well-developed.   HENT:      Head: Normocephalic and atraumatic.      Mouth/Throat:      Pharynx: No oropharyngeal exudate.   Eyes:      General: No scleral icterus.        Right eye: No discharge.         Left eye: No discharge.      Conjunctiva/sclera: Conjunctivae normal.      Pupils: Pupils are equal, round, and reactive to light.   Pulmonary:      Effort: Pulmonary effort is normal. No respiratory distress.      Breath sounds: Normal breath sounds. No wheezing.   Abdominal:      General: There is no distension.      Palpations: Abdomen is soft.      Tenderness: There is no abdominal tenderness.   Neurological:      Mental Status: She is alert and oriented to person, place, and time.   Psychiatric:         Behavior: Behavior normal.       Computed MELD-Na score unavailable. Necessary lab results were not found in the last year.  Computed MELD score unavailable. Necessary lab results were not found in the last year.    WBC   Date Value Ref Range Status   08/11/2022 4.80 3.90 - 12.70 K/uL Final     Hemoglobin   Date Value Ref Range Status   08/11/2022 10.5 (L) 12.0 - 16.0 g/dL Final     Hematocrit   Date Value Ref Range Status   08/11/2022 33.6 (L) 37.0 - 48.5 % Final     Platelets    Date Value Ref Range Status   08/11/2022 225 150 - 450 K/uL Final     BUN   Date Value Ref Range Status   08/11/2022 22 8 - 23 mg/dL Final     Creatinine   Date Value Ref Range Status   08/11/2022 1.1 0.5 - 1.4 mg/dL Final     Glucose   Date Value Ref Range Status   08/11/2022 76 70 - 110 mg/dL Final     Calcium   Date Value Ref Range Status   08/11/2022 9.5 8.7 - 10.5 mg/dL Final     Sodium   Date Value Ref Range Status   08/11/2022 138 136 - 145 mmol/L Final     Potassium   Date Value Ref Range Status   08/11/2022 3.8 3.5 - 5.1 mmol/L Final     Chloride   Date Value Ref Range Status   08/11/2022 104 95 - 110 mmol/L Final     AST   Date Value Ref Range Status   08/11/2022 18 10 - 40 U/L Final     ALT   Date Value Ref Range Status   08/11/2022 9 (L) 10 - 44 U/L Final     Alkaline Phosphatase   Date Value Ref Range Status   08/11/2022 38 (L) 55 - 135 U/L Final     Total Bilirubin   Date Value Ref Range Status   08/11/2022 0.8 0.1 - 1.0 mg/dL Final     Comment:     For infants and newborns, interpretation of results should be based  on gestational age, weight and in agreement with clinical  observations.    Premature Infant recommended reference ranges:  Up to 24 hours.............<8.0 mg/dL  Up to 48 hours............<12.0 mg/dL  3-5 days..................<15.0 mg/dL  6-29 days.................<15.0 mg/dL       Albumin   Date Value Ref Range Status   08/11/2022 3.6 3.5 - 5.2 g/dL Final     INR   Date Value Ref Range Status   11/21/2019 1.0 0.8 - 1.2 Final     Comment:     Coumadin Therapy:  2.0 - 3.0 for INR for all indicators except mechanical heart valves  and antiphospholipid syndromes which should use 2.5 - 3.5.           Assessment/Plan:     1. Autoimmune hepatitis      Julianna Montemayor is a 72 y.o. female withAutoimmune hepatitis    Autoimmune hepatitis-patient has been stably managed by Dr. Reeves for years.  She is doing well.  Would not make any changes.  She is on low doses of both immunosuppressive  medications.  -continue to monitor labs  -     Ambulatory referral/consult to Hepatology  -     Comprehensive Metabolic Panel; Standing  -     CBC Auto Differential; Standing  -     predniSONE (DELTASONE) 2.5 MG tablet; Take 1 tablet (2.5 mg total) by mouth once daily.  Dispense: 90 tablet; Refill: 3  -     azaTHIOprine (IMURAN) 50 mg Tab; Take 1 tablet (50 mg total) by mouth once daily.  Dispense: 90 tablet; Refill: 3    RTC in 6 months with preclinic labs      Madison Santos MD

## 2023-04-24 ENCOUNTER — TELEPHONE (OUTPATIENT)
Dept: FAMILY MEDICINE | Facility: CLINIC | Age: 73
End: 2023-04-24

## 2023-04-24 NOTE — TELEPHONE ENCOUNTER
----- Message from Krupa Méndez sent at 4/24/2023  9:30 AM CDT -----  Regarding: patient request  Name of Who is Calling: OCNY VERDUGO [[id number]]      What is the request in detail: Patient is requesting a call back to schedule a same day apt. Patient declined doctor's next available in June and denied next available with another provider on Wednesday. Patient states she has cold/flu like symptoms. Please advise!        Can the clinic reply by [facility]: NO        [facility]: [phone]

## 2023-04-24 NOTE — TELEPHONE ENCOUNTER
Spoke with pt and pt states that she has been having some cold symptoms for 1 week now. Pt symptoms sinus, coughing up thick yellow mucus,eyes red, hoarse. Post nasal drip. Pt has been scheduled for appt.

## 2023-04-25 NOTE — TELEPHONE ENCOUNTER
----- Message from Gonzalo Munroe sent at 4/25/2023  8:45 AM CDT -----  Contact: Self - [phone]  Patient is requesting a call back regarding personal health. Please give her a call back at [phone]

## 2023-06-20 ENCOUNTER — OFFICE VISIT (OUTPATIENT)
Dept: OPHTHALMOLOGY | Facility: CLINIC | Age: 73
End: 2023-06-20
Payer: MEDICARE

## 2023-06-20 DIAGNOSIS — H25.11 SENILE NUCLEAR CATARACT, RIGHT: ICD-10-CM

## 2023-06-20 DIAGNOSIS — H52.7 REFRACTION DISORDER: ICD-10-CM

## 2023-06-20 DIAGNOSIS — H25.12 SENILE NUCLEAR CATARACT, LEFT: Primary | ICD-10-CM

## 2023-06-20 PROCEDURE — 99214 OFFICE O/P EST MOD 30 MIN: CPT | Mod: S$PBB,,, | Performed by: OPHTHALMOLOGY

## 2023-06-20 PROCEDURE — 92015 PR REFRACTION: ICD-10-PCS | Mod: ,,, | Performed by: OPHTHALMOLOGY

## 2023-06-20 PROCEDURE — 99999 PR PBB SHADOW E&M-EST. PATIENT-LVL II: ICD-10-PCS | Mod: PBBFAC,,, | Performed by: OPHTHALMOLOGY

## 2023-06-20 PROCEDURE — 99999 PR PBB SHADOW E&M-EST. PATIENT-LVL II: CPT | Mod: PBBFAC,,, | Performed by: OPHTHALMOLOGY

## 2023-06-20 PROCEDURE — 99212 OFFICE O/P EST SF 10 MIN: CPT | Mod: PBBFAC | Performed by: OPHTHALMOLOGY

## 2023-06-20 PROCEDURE — 92015 DETERMINE REFRACTIVE STATE: CPT | Mod: ,,, | Performed by: OPHTHALMOLOGY

## 2023-06-20 PROCEDURE — 99214 PR OFFICE/OUTPT VISIT, EST, LEVL IV, 30-39 MIN: ICD-10-PCS | Mod: S$PBB,,, | Performed by: OPHTHALMOLOGY

## 2023-06-20 NOTE — PROGRESS NOTES
HPI     Cataract            Comments: Last exam 2021          Comments    Patient states that OD seems a little weaker at near - OS seems unchanged   - denies pain/ discomfort OU - OD seems heavy. Like lid is hard to open         DNL patient    1. Cataracts OU            Last edited by Kari Kovacs MA on 6/20/2023  8:09 AM.            Assessment /Plan     For exam results, see Encounter Report.      ICD-10-CM ICD-9-CM    1. Senile nuclear cataract, left  H25.12 366.16 You were found to have an early cataract in your eye(s) today. However the cataract is not affecting your activities of daily living, such as reading and driving. You do not need surgery at this time. We will recheck your cataract at your next visit. You are welcome to call for an earlier appointment if your vision gets worse.       2. Senile nuclear cataract, right  H25.11 366.16 As above       3. Refraction disorder  H52.7 367.9 Disp MRx today          Return to clinic 1 year or PRN

## 2023-07-19 ENCOUNTER — OFFICE VISIT (OUTPATIENT)
Dept: HEPATOLOGY | Facility: CLINIC | Age: 73
End: 2023-07-19
Payer: MEDICARE

## 2023-07-19 ENCOUNTER — LAB VISIT (OUTPATIENT)
Dept: LAB | Facility: HOSPITAL | Age: 73
End: 2023-07-19
Attending: INTERNAL MEDICINE
Payer: MEDICARE

## 2023-07-19 VITALS
SYSTOLIC BLOOD PRESSURE: 115 MMHG | HEART RATE: 52 BPM | DIASTOLIC BLOOD PRESSURE: 75 MMHG | WEIGHT: 132.69 LBS | HEIGHT: 65 IN | BODY MASS INDEX: 22.11 KG/M2

## 2023-07-19 DIAGNOSIS — K75.4 AUTOIMMUNE HEPATITIS: ICD-10-CM

## 2023-07-19 DIAGNOSIS — R00.1 BRADYCARDIA: ICD-10-CM

## 2023-07-19 DIAGNOSIS — K75.4 AUTOIMMUNE HEPATITIS: Primary | ICD-10-CM

## 2023-07-19 DIAGNOSIS — N28.9 RENAL INSUFFICIENCY: ICD-10-CM

## 2023-07-19 LAB
ALBUMIN SERPL BCP-MCNC: 3.7 G/DL (ref 3.5–5.2)
ALP SERPL-CCNC: 40 U/L (ref 55–135)
ALT SERPL W/O P-5'-P-CCNC: 10 U/L (ref 10–44)
ANION GAP SERPL CALC-SCNC: 9 MMOL/L (ref 8–16)
AST SERPL-CCNC: 17 U/L (ref 10–40)
BASOPHILS # BLD AUTO: 0.03 K/UL (ref 0–0.2)
BASOPHILS NFR BLD: 0.6 % (ref 0–1.9)
BILIRUB SERPL-MCNC: 0.5 MG/DL (ref 0.1–1)
BUN SERPL-MCNC: 24 MG/DL (ref 8–23)
CALCIUM SERPL-MCNC: 9.6 MG/DL (ref 8.7–10.5)
CHLORIDE SERPL-SCNC: 107 MMOL/L (ref 95–110)
CO2 SERPL-SCNC: 25 MMOL/L (ref 23–29)
CREAT SERPL-MCNC: 1.3 MG/DL (ref 0.5–1.4)
DIFFERENTIAL METHOD: ABNORMAL
EOSINOPHIL # BLD AUTO: 0.2 K/UL (ref 0–0.5)
EOSINOPHIL NFR BLD: 2.8 % (ref 0–8)
ERYTHROCYTE [DISTWIDTH] IN BLOOD BY AUTOMATED COUNT: 13.5 % (ref 11.5–14.5)
EST. GFR  (NO RACE VARIABLE): 43 ML/MIN/1.73 M^2
GLUCOSE SERPL-MCNC: 88 MG/DL (ref 70–110)
HCT VFR BLD AUTO: 32.5 % (ref 37–48.5)
HGB BLD-MCNC: 10.4 G/DL (ref 12–16)
IMM GRANULOCYTES # BLD AUTO: 0.01 K/UL (ref 0–0.04)
IMM GRANULOCYTES NFR BLD AUTO: 0.2 % (ref 0–0.5)
LYMPHOCYTES # BLD AUTO: 1.9 K/UL (ref 1–4.8)
LYMPHOCYTES NFR BLD: 36 % (ref 18–48)
MCH RBC QN AUTO: 30.1 PG (ref 27–31)
MCHC RBC AUTO-ENTMCNC: 32 G/DL (ref 32–36)
MCV RBC AUTO: 94 FL (ref 82–98)
MONOCYTES # BLD AUTO: 0.5 K/UL (ref 0.3–1)
MONOCYTES NFR BLD: 10.2 % (ref 4–15)
NEUTROPHILS # BLD AUTO: 2.7 K/UL (ref 1.8–7.7)
NEUTROPHILS NFR BLD: 50.2 % (ref 38–73)
NRBC BLD-RTO: 0 /100 WBC
PLATELET # BLD AUTO: 187 K/UL (ref 150–450)
PMV BLD AUTO: 9.4 FL (ref 9.2–12.9)
POTASSIUM SERPL-SCNC: 4 MMOL/L (ref 3.5–5.1)
PROT SERPL-MCNC: 7.5 G/DL (ref 6–8.4)
RBC # BLD AUTO: 3.45 M/UL (ref 4–5.4)
SODIUM SERPL-SCNC: 141 MMOL/L (ref 136–145)
WBC # BLD AUTO: 5.31 K/UL (ref 3.9–12.7)

## 2023-07-19 PROCEDURE — 99999 PR PBB SHADOW E&M-EST. PATIENT-LVL III: ICD-10-PCS | Mod: PBBFAC,,, | Performed by: INTERNAL MEDICINE

## 2023-07-19 PROCEDURE — 99213 PR OFFICE/OUTPT VISIT, EST, LEVL III, 20-29 MIN: ICD-10-PCS | Mod: S$PBB,,, | Performed by: INTERNAL MEDICINE

## 2023-07-19 PROCEDURE — 80053 COMPREHEN METABOLIC PANEL: CPT | Performed by: INTERNAL MEDICINE

## 2023-07-19 PROCEDURE — 99999 PR PBB SHADOW E&M-EST. PATIENT-LVL III: CPT | Mod: PBBFAC,,, | Performed by: INTERNAL MEDICINE

## 2023-07-19 PROCEDURE — 99213 OFFICE O/P EST LOW 20 MIN: CPT | Mod: S$PBB,,, | Performed by: INTERNAL MEDICINE

## 2023-07-19 PROCEDURE — 36415 COLL VENOUS BLD VENIPUNCTURE: CPT | Performed by: INTERNAL MEDICINE

## 2023-07-19 PROCEDURE — 85025 COMPLETE CBC W/AUTO DIFF WBC: CPT | Performed by: INTERNAL MEDICINE

## 2023-07-19 PROCEDURE — 99213 OFFICE O/P EST LOW 20 MIN: CPT | Mod: PBBFAC | Performed by: INTERNAL MEDICINE

## 2023-07-19 NOTE — Clinical Note
Can you let her know that since her HR runs low if she notices dizziness or lightheadedness she will need to discuss with her PCP

## 2023-07-19 NOTE — PROGRESS NOTES
Subjective:     Julianna Montemayor is here for follow up of autoimmune hepatitis       HPI  Since Julianna Montemayor's last visit she denies any significant issues. She has overall been stable and compliant with meds. She acknowledges not drinking a lot of water, denies NSAID use.    Review of Systems    Objective:     Physical Exam  Vitals reviewed.   Constitutional:       General: She is not in acute distress.     Appearance: She is well-developed.   HENT:      Head: Normocephalic and atraumatic.      Mouth/Throat:      Pharynx: No oropharyngeal exudate.   Eyes:      General: No scleral icterus.        Right eye: No discharge.         Left eye: No discharge.      Conjunctiva/sclera: Conjunctivae normal.      Pupils: Pupils are equal, round, and reactive to light.   Pulmonary:      Effort: Pulmonary effort is normal. No respiratory distress.      Breath sounds: Normal breath sounds. No wheezing.   Abdominal:      General: There is no distension.      Palpations: Abdomen is soft.      Tenderness: There is no abdominal tenderness.   Neurological:      Mental Status: She is alert and oriented to person, place, and time.   Psychiatric:         Behavior: Behavior normal.       Computed MELD 3.0 unavailable. Necessary lab results were not found in the last year.  Computed MELD-Na unavailable. Necessary lab results were not found in the last year.      WBC   Date Value Ref Range Status   07/19/2023 5.31 3.90 - 12.70 K/uL Final     Hemoglobin   Date Value Ref Range Status   07/19/2023 10.4 (L) 12.0 - 16.0 g/dL Final     Hematocrit   Date Value Ref Range Status   07/19/2023 32.5 (L) 37.0 - 48.5 % Final     Platelets   Date Value Ref Range Status   07/19/2023 187 150 - 450 K/uL Final     BUN   Date Value Ref Range Status   07/19/2023 24 (H) 8 - 23 mg/dL Final     Creatinine   Date Value Ref Range Status   07/19/2023 1.3 0.5 - 1.4 mg/dL Final     Glucose   Date Value Ref Range Status   07/19/2023 88 70 - 110 mg/dL Final      Calcium   Date Value Ref Range Status   07/19/2023 9.6 8.7 - 10.5 mg/dL Final     Sodium   Date Value Ref Range Status   07/19/2023 141 136 - 145 mmol/L Final     Potassium   Date Value Ref Range Status   07/19/2023 4.0 3.5 - 5.1 mmol/L Final     Chloride   Date Value Ref Range Status   07/19/2023 107 95 - 110 mmol/L Final     AST   Date Value Ref Range Status   07/19/2023 17 10 - 40 U/L Final     ALT   Date Value Ref Range Status   07/19/2023 10 10 - 44 U/L Final     Alkaline Phosphatase   Date Value Ref Range Status   07/19/2023 40 (L) 55 - 135 U/L Final     Total Bilirubin   Date Value Ref Range Status   07/19/2023 0.5 0.1 - 1.0 mg/dL Final     Comment:     For infants and newborns, interpretation of results should be based  on gestational age, weight and in agreement with clinical  observations.    Premature Infant recommended reference ranges:  Up to 24 hours.............<8.0 mg/dL  Up to 48 hours............<12.0 mg/dL  3-5 days..................<15.0 mg/dL  6-29 days.................<15.0 mg/dL       Albumin   Date Value Ref Range Status   07/19/2023 3.7 3.5 - 5.2 g/dL Final     INR   Date Value Ref Range Status   11/21/2019 1.0 0.8 - 1.2 Final     Comment:     Coumadin Therapy:  2.0 - 3.0 for INR for all indicators except mechanical heart valves  and antiphospholipid syndromes which should use 2.5 - 3.5.           Assessment/Plan:     1. Autoimmune hepatitis    2. Renal insufficiency    3. Bradycardia      Julianna Montemayor is a 72 y.o. female withautoimmune hepatitis     Autoimmune hepatitis- normal LFTs  -Continue on treatment  -Monitor labs  -     Comprehensive Metabolic Panel; Standing  -     CBC Auto Differential; Standing    Renal insufficiency-slight change from previous, suspect from some dehydration  -Recommend increased water intake    Bradycardia-episodically low, no symptoms  -Will advise patient to monitor and if she feels lightheaded or dizziness to see her PCP    RTC in 6 months with  preclinic labs        Madison Carrillo MD

## 2023-07-20 NOTE — PROGRESS NOTES
Contacted patient to inform her of her upcoming appointments as well as Dr. Carrillo's comment on her heartrate to which she voiced understanding.

## 2023-08-22 ENCOUNTER — OFFICE VISIT (OUTPATIENT)
Dept: FAMILY MEDICINE | Facility: CLINIC | Age: 73
End: 2023-08-22
Payer: MEDICARE

## 2023-08-22 VITALS
BODY MASS INDEX: 22.26 KG/M2 | HEART RATE: 75 BPM | HEIGHT: 65 IN | DIASTOLIC BLOOD PRESSURE: 74 MMHG | OXYGEN SATURATION: 98 % | TEMPERATURE: 98 F | SYSTOLIC BLOOD PRESSURE: 118 MMHG | WEIGHT: 133.63 LBS

## 2023-08-22 DIAGNOSIS — I10 ESSENTIAL HYPERTENSION: Primary | Chronic | ICD-10-CM

## 2023-08-22 DIAGNOSIS — E78.5 HYPERLIPIDEMIA, UNSPECIFIED HYPERLIPIDEMIA TYPE: Chronic | ICD-10-CM

## 2023-08-22 DIAGNOSIS — N18.31 CHRONIC KIDNEY DISEASE, STAGE 3A: Chronic | ICD-10-CM

## 2023-08-22 DIAGNOSIS — K75.4 AUTOIMMUNE HEPATITIS: Chronic | ICD-10-CM

## 2023-08-22 PROCEDURE — 99214 OFFICE O/P EST MOD 30 MIN: CPT | Mod: PBBFAC,PO | Performed by: FAMILY MEDICINE

## 2023-08-22 PROCEDURE — 99999 PR PBB SHADOW E&M-EST. PATIENT-LVL IV: CPT | Mod: PBBFAC,,, | Performed by: FAMILY MEDICINE

## 2023-08-22 PROCEDURE — 99214 PR OFFICE/OUTPT VISIT, EST, LEVL IV, 30-39 MIN: ICD-10-PCS | Mod: S$PBB,,, | Performed by: FAMILY MEDICINE

## 2023-08-22 PROCEDURE — 99999 PR PBB SHADOW E&M-EST. PATIENT-LVL IV: ICD-10-PCS | Mod: PBBFAC,,, | Performed by: FAMILY MEDICINE

## 2023-08-22 PROCEDURE — 99214 OFFICE O/P EST MOD 30 MIN: CPT | Mod: S$PBB,,, | Performed by: FAMILY MEDICINE

## 2023-08-22 NOTE — PROGRESS NOTES
CHIEF COMPLAINT:  This is a 72-year-old female here for follow-up of chronic medical conditions and for preventive health exam.     SUBJECTIVE: Patient is doing well without complaints. The patient's blood pressure is controlled on lisinopril HCT 10-12.5 mg daily. Her BP today is 118/74. She has chronic kidney disease stage 3b. She is taking Imuran and oral steroids for autoimmune hepatitis. She has chronic anemia. The patient has osteopenia but refuses bisphosphonates.       Eye exam June 2023. Mammogram December 2015 (refuses mammograms). Bone DEXA scan August 2021.  Colonoscopy May 2022 due again May 2027. Tdap April 2012. Pneumovax April 2012. Flu vaccine, November 2022.  Prevnar vaccine December 2015. COVID 19 vaccine February, March, December 2021.     ROS:  GENERAL: Patient denies fever, chills, night sweats. Patient denies weight loss. Patient denies anorexia, fatigue, weakness or swollen glands.  SKIN: Patient denies rash or hair loss.  HEENT: Patient denies sore throat, ear pain, hearing loss, nasal congestion, or runny nose. Patient denies visual disturbance, eye irritation or discharge.  LUNGS: Patient denies cough, wheeze or hemoptysis.  CARDIOVASCULAR: Patient denies chest pain, shortness of breath, palpitations, syncope or lower extremity edema.  GI: Patient denies abdominal pain, nausea, vomiting, diarrhea, constipation, blood in stool or melena.  GENITOURINARY: Patient denies pelvic pain, vaginal discharge, itch or odor. Patient denies irregular vaginal bleeding. Patient denies dysuria, frequency, hematuria, nocturia, urgency or incontinence.  BREASTS: Patient denies breast pain, mass or nipple discharge.  MUSCULOSKELETAL: Patient denies joint swelling, redness or warmth. Positive for joint pain.  NEUROLOGIC: Patient denies headache, vertigo, paresthesias, weakness in limb, dysarthria, dysphagia or abnormality of gait.  PSYCHIATRIC: Patient denies anxiety, depression, or memory loss.     OBJECTIVE:    GENERAL: Well-developed well-nourished black female alert and oriented x3, in no acute distress. Memory, judgment and cognition without deficit.  SKIN: Clear without rash. Normal color and tone. Lipoma left upper extremity. Ganglion cyst, left dorsal wrist. Small, movable nodule right dorsal foot.  Dry flaky skin, left foot.  HEENT: Eyes: Clear conjunctivae. Pupils equal reactive to light and accommodation. Ears: Clear TMs. Clear canals. Nose: Moderate nasal congestion, left greater than right. Pharynx: Without injection or exudates.  NECK: Supple, normal range of motion. No masses, nodes or enlarged thyroid. No JVD. Carotids 2+ and equal. No bruits.  LUNGS: Clear to auscultation. Normal respiratory effort.  CARDIOVASCULAR: Regular rhythm, normal S1, S2 without murmur, gallop or rub.  BACK: No CVA or spinal tenderness.  BREASTS: No masses, tenderness or nipple discharge.  ABDOMEN: Normal appearance. Active bowel sounds. Soft, nontender without mass or organomegaly. No rebound or guarding.  EXTREMITIES: Without cyanosis, clubbing or edema. Distal pulses 2+ and equal. Normal range of motion in all extremities. No joint effusion, erythema or warmth.  NEUROLOGIC: Cranial nerves II through XII slightly overweight without deficit. Motor strength equal bilaterally. Sensation normal to touch. Deep tendon reflexes 2+ and equal. Gait without abnormality. No tremor. Negative cerebellar signs.  PELVIC: Deferred to 2024.    ASSESSMENT:  1. Essential hypertension    2. Hyperlipidemia, unspecified hyperlipidemia type    3. Autoimmune hepatitis    4. Chronic kidney disease, stage 3a      PLAN:  1. Exercise regularly.  2. Age-appropriate counseling.  3. Check lipid profile and TSH.   4. Refill medications.  5. Follow up annually.    30 minutes of total time spent on the encounter, which includes face to face time and non-face to face time preparing to see the patient (eg, review of tests), Obtaining and/or reviewing separately  obtained history, Documenting clinical information in the electronic or other health record, Independently interpreting results (not separately reported) and communicating results to the patient/family/caregiver, or Care coordination (not separately reported).     This note is generated with speech recognition software and is subject to transcription error and sound alike phrases that may be missed by proofreading.

## 2023-08-25 ENCOUNTER — LAB VISIT (OUTPATIENT)
Dept: LAB | Facility: HOSPITAL | Age: 73
End: 2023-08-25
Attending: FAMILY MEDICINE
Payer: MEDICARE

## 2023-08-25 DIAGNOSIS — E78.5 HYPERLIPIDEMIA, UNSPECIFIED HYPERLIPIDEMIA TYPE: Chronic | ICD-10-CM

## 2023-08-25 LAB
CHOLEST SERPL-MCNC: 224 MG/DL (ref 120–199)
CHOLEST/HDLC SERPL: 2.8 {RATIO} (ref 2–5)
HDLC SERPL-MCNC: 79 MG/DL (ref 40–75)
HDLC SERPL: 35.3 % (ref 20–50)
LDLC SERPL CALC-MCNC: 134.2 MG/DL (ref 63–159)
NONHDLC SERPL-MCNC: 145 MG/DL
TRIGL SERPL-MCNC: 54 MG/DL (ref 30–150)
TSH SERPL DL<=0.005 MIU/L-ACNC: 0.82 UIU/ML (ref 0.4–4)

## 2023-08-25 PROCEDURE — 36415 COLL VENOUS BLD VENIPUNCTURE: CPT | Mod: PO | Performed by: FAMILY MEDICINE

## 2023-08-25 PROCEDURE — 84443 ASSAY THYROID STIM HORMONE: CPT | Performed by: FAMILY MEDICINE

## 2023-08-25 PROCEDURE — 80061 LIPID PANEL: CPT | Performed by: FAMILY MEDICINE

## 2023-10-13 ENCOUNTER — TELEPHONE (OUTPATIENT)
Dept: FAMILY MEDICINE | Facility: CLINIC | Age: 73
End: 2023-10-13
Payer: MEDICARE

## 2023-10-13 NOTE — TELEPHONE ENCOUNTER
----- Message from Martha Newby sent at 10/13/2023  8:55 AM CDT -----  Regarding: pt called  Name of Who is Calling: CONY VERDUGO [[id number]]      What is the request in detail: pt is requesting medication be called in for sinus and cold. Please advise         [id number] - [place] - [address] AT AT [facility]  [address] [facility]  [place] [address]  Phone: [phone] Fax: [phone]          Can the clinic reply by [facility]: No       What Number to Call Back if not in [facility]: [phone]

## 2023-10-13 NOTE — TELEPHONE ENCOUNTER
----- Message from Vannesa Kim sent at 10/13/2023 10:32 AM CDT -----  .Type:  Patient Returning Call    Who Called:Julianna  Who Left Message for Patient:Nurse  Does the patient know what this is regarding?:Yes  Would the patient rather a call back or a response via [facility]? Call back  Best Call Back Number:.[phone]     Additional Information: na        Thanks

## 2023-10-13 NOTE — TELEPHONE ENCOUNTER
Need more information about her symptoms.  Runny nose, stuffy nose, sore throat, postnasal drip, etc. etc.

## 2023-10-13 NOTE — TELEPHONE ENCOUNTER
LOV - 08/22/2023    Patient called stating that she is having sinus trouble. And wants to know if you would recommend something that she should take.

## 2023-10-13 NOTE — TELEPHONE ENCOUNTER
No antibiotics.  Most of the symptoms are viral.  Try Claritin, Zyrtec or Allegra.  Tylenol for achiness or fever.  Robitussin DM for cough.  If no improvement, make an appointment to be evaluated.

## 2023-10-16 ENCOUNTER — OFFICE VISIT (OUTPATIENT)
Dept: FAMILY MEDICINE | Facility: CLINIC | Age: 73
End: 2023-10-16
Payer: MEDICARE

## 2023-10-16 VITALS
WEIGHT: 134.81 LBS | SYSTOLIC BLOOD PRESSURE: 108 MMHG | HEART RATE: 65 BPM | OXYGEN SATURATION: 95 % | TEMPERATURE: 97 F | HEIGHT: 65 IN | BODY MASS INDEX: 22.46 KG/M2 | DIASTOLIC BLOOD PRESSURE: 70 MMHG

## 2023-10-16 DIAGNOSIS — J32.9 SINUSITIS, UNSPECIFIED CHRONICITY, UNSPECIFIED LOCATION: Primary | ICD-10-CM

## 2023-10-16 DIAGNOSIS — R05.9 COUGH, UNSPECIFIED TYPE: ICD-10-CM

## 2023-10-16 PROCEDURE — 99999 PR PBB SHADOW E&M-EST. PATIENT-LVL III: CPT | Mod: PBBFAC,,, | Performed by: REGISTERED NURSE

## 2023-10-16 PROCEDURE — 99213 PR OFFICE/OUTPT VISIT, EST, LEVL III, 20-29 MIN: ICD-10-PCS | Mod: 25,S$PBB,, | Performed by: REGISTERED NURSE

## 2023-10-16 PROCEDURE — 99213 OFFICE O/P EST LOW 20 MIN: CPT | Mod: 25,PBBFAC,PO | Performed by: REGISTERED NURSE

## 2023-10-16 PROCEDURE — 99999PBSHW PR PBB SHADOW TECHNICAL ONLY FILED TO HB: Mod: PBBFAC,,,

## 2023-10-16 PROCEDURE — 99999 PR PBB SHADOW E&M-EST. PATIENT-LVL III: ICD-10-PCS | Mod: PBBFAC,,, | Performed by: REGISTERED NURSE

## 2023-10-16 PROCEDURE — 99213 OFFICE O/P EST LOW 20 MIN: CPT | Mod: 25,S$PBB,, | Performed by: REGISTERED NURSE

## 2023-10-16 PROCEDURE — 96372 THER/PROPH/DIAG INJ SC/IM: CPT | Mod: PBBFAC,PO

## 2023-10-16 PROCEDURE — 99999PBSHW PR PBB SHADOW TECHNICAL ONLY FILED TO HB: ICD-10-PCS | Mod: PBBFAC,,,

## 2023-10-16 RX ORDER — METHYLPREDNISOLONE ACETATE 40 MG/ML
40 INJECTION, SUSPENSION INTRA-ARTICULAR; INTRALESIONAL; INTRAMUSCULAR; SOFT TISSUE
Status: COMPLETED | OUTPATIENT
Start: 2023-10-16 | End: 2023-10-16

## 2023-10-16 RX ADMIN — METHYLPREDNISOLONE ACETATE 40 MG: 40 INJECTION, SUSPENSION INTRALESIONAL; INTRAMUSCULAR; INTRASYNOVIAL; SOFT TISSUE at 11:10

## 2023-10-16 NOTE — PROGRESS NOTES
SUBJECTIVE:     Julianna Montemayor is a 73 y.o. female is here today with c/o:    SINUS ISSUES    HPI:    Mrs. Montemayor is here with c/o congestion, cough, headaches, nasal congestion, and sinus pressure.   is w/ her today as he has been ill and has appt to be seen today.  Onset of symptoms was 1 to 2 weeks ago.  Symptoms have been gradually worsening since that time.  She is not drinking much.  Patient is non-smoker but positive for PSE ().  Tx with Robitussin-DM or Mucinex.      REVIEW OF SYSTEMS:    Review of Systems   Constitutional:  Positive for malaise/fatigue. Negative for chills and fever.   HENT:  Positive for congestion, ear pain (more of pressure) and sinus pain. Negative for sore throat.    Respiratory:  Positive for cough and sputum production (yellow).    Cardiovascular: Negative.    Neurological:  Positive for headaches. Negative for dizziness, tingling, tremors, seizures, loss of consciousness and weakness.   All other systems reviewed and are negative.        CURRENT ALLERGIES:    Review of patient's allergies indicates:   Allergen Reactions    No known drug allergies        CURRENT PROBLEM LIST:    Patient Active Problem List   Diagnosis    Autoimmune hepatitis    Anemia    Essential hypertension    Hyperlipidemia    Osteopenia    Allergic rhinitis    Vitamin D deficiency disease    Osteoarthritis of right knee    Anxiety associated with depression    Immunosuppressed status    Chronic kidney disease, stage 3a       CURRENT MEDICATION LIST:      Current Outpatient Medications:     azaTHIOprine (IMURAN) 50 mg Tab, Take 1 tablet (50 mg total) by mouth once daily., Disp: 90 tablet, Rfl: 3    calcium carb/vit D3/minerals (CALCIUM-VITAMIN D ORAL), Take 1 tablet by mouth once daily. Calcium 600 and vitamin d 800IU/day, Disp: , Rfl:     cholecalciferol, vitamin D3, 125 mcg (5,000 unit) Tab, Take 1,000 Units by mouth once daily., Disp: , Rfl:     lisinopriL-hydrochlorothiazide  "(PRINZIDE,ZESTORETIC) 10-12.5 mg per tablet, TAKE 1 TABLET BY MOUTH EVERY DAY, Disp: 90 tablet, Rfl: 3    predniSONE (DELTASONE) 2.5 MG tablet, Take 1 tablet (2.5 mg total) by mouth once daily., Disp: 90 tablet, Rfl: 3      HISTORY:    Past medical, surgical, family and social histories have been reviewed today.      OBJECTIVE:     Vitals:    10/16/23 1117   BP: 108/70   Pulse: 65   Temp: 96.7 °F (35.9 °C)   SpO2: 95%   Weight: 61.1 kg (134 lb 13 oz)   Height: 5' 5" (1.651 m)   PainSc: 0-No pain       Physical Exam  Vitals reviewed.   Constitutional:       General: She is not in acute distress.  HENT:      Head: Normocephalic and atraumatic.      Right Ear: Tympanic membrane normal.      Left Ear: Tympanic membrane normal.      Nose: Mucosal edema and congestion present. No rhinorrhea.      Right Sinus: Frontal sinus tenderness present. No maxillary sinus tenderness.      Left Sinus: Frontal sinus tenderness present. No maxillary sinus tenderness.      Mouth/Throat:      Mouth: Mucous membranes are moist.      Pharynx: Oropharynx is clear. No pharyngeal swelling or posterior oropharyngeal erythema.   Eyes:      Pupils: Pupils are equal, round, and reactive to light.   Cardiovascular:      Rate and Rhythm: Normal rate and regular rhythm.      Pulses: Normal pulses.      Heart sounds: Normal heart sounds.   Pulmonary:      Effort: Pulmonary effort is normal.      Breath sounds: Normal breath sounds.   Musculoskeletal:         General: Normal range of motion.      Cervical back: Normal range of motion and neck supple. No rigidity.      Right lower leg: No edema.      Left lower leg: No edema.   Lymphadenopathy:      Cervical: No cervical adenopathy.   Skin:     Capillary Refill: Capillary refill takes less than 2 seconds.   Neurological:      Mental Status: She is alert and oriented to person, place, and time.      Motor: No weakness.      Gait: Gait normal.   Psychiatric:         Mood and Affect: Mood normal.         " Behavior: Behavior normal.         Thought Content: Thought content normal.         Judgment: Judgment normal.         ASSESSMENT:     1. Sinusitis, unspecified chronicity, unspecified location  Steroid IM ordered per pt request.  Symptomatic care, rest, hydration.  Monitor.  -     methylPREDNISolone acetate injection 40 mg    2. Cough, unspecified type  Steroid IM today followed by Coricidin-HBP, Mucinex or Robitussin-DM.  Monitor bp.  -     methylPREDNISolone acetate injection 40 mg      PLAN:     Contact office back in 2 to 3 days if worse or no better --- may need abx.  RTC as directed and/or prn.        RUMA Still  [facility]       25 minutes of total time spent on the encounter, which includes face to face time and non-face to face time preparing to see the patient.  This includes obtaining and/or reviewing separately obtained history, performing a medically appropriate examination and/or evaluation, and counseling and educating the patient/family/caregiver.  Includes documenting clinical information in the electronic or other health record, independently interpreting results (not separately reported) and communicating results to the patient/family/caregiver, with care coordination (not separately reported).  Medications, tests and/or procedures ordered as necessary along with referring and communicating with other health professionals (when not separately reported).

## 2023-10-18 ENCOUNTER — TELEPHONE (OUTPATIENT)
Dept: FAMILY MEDICINE | Facility: CLINIC | Age: 73
End: 2023-10-18
Payer: MEDICARE

## 2023-10-18 RX ORDER — AMOXICILLIN 875 MG/1
875 TABLET, FILM COATED ORAL 2 TIMES DAILY
Qty: 14 TABLET | Refills: 0 | Status: SHIPPED | OUTPATIENT
Start: 2023-10-18 | End: 2023-10-25

## 2023-10-18 NOTE — TELEPHONE ENCOUNTER
LOV- 10/16/2023    Patient called this morning stating that she had a steroid shot, and is not feeling better. She's requesting an antibiotic to be called in to her pharmacy.

## 2023-10-28 RX ORDER — LISINOPRIL AND HYDROCHLOROTHIAZIDE 10; 12.5 MG/1; MG/1
1 TABLET ORAL DAILY
Qty: 90 TABLET | Refills: 2 | Status: SHIPPED | OUTPATIENT
Start: 2023-10-28

## 2023-10-28 NOTE — TELEPHONE ENCOUNTER
No care due was identified.  [facility] Embedded Care Due Messages. Reference number: [id number].   10/28/2023 7:09:12 AM CDT

## 2024-01-29 ENCOUNTER — LAB VISIT (OUTPATIENT)
Dept: LAB | Facility: HOSPITAL | Age: 74
End: 2024-01-29
Attending: INTERNAL MEDICINE
Payer: MEDICARE

## 2024-01-29 ENCOUNTER — OFFICE VISIT (OUTPATIENT)
Dept: HEPATOLOGY | Facility: CLINIC | Age: 74
End: 2024-01-29
Payer: MEDICARE

## 2024-01-29 VITALS
DIASTOLIC BLOOD PRESSURE: 76 MMHG | HEART RATE: 67 BPM | HEIGHT: 65 IN | WEIGHT: 133.38 LBS | BODY MASS INDEX: 22.22 KG/M2 | SYSTOLIC BLOOD PRESSURE: 121 MMHG

## 2024-01-29 DIAGNOSIS — K75.4 AUTOIMMUNE HEPATITIS: Primary | ICD-10-CM

## 2024-01-29 DIAGNOSIS — K75.4 AUTOIMMUNE HEPATITIS: ICD-10-CM

## 2024-01-29 LAB
ALBUMIN SERPL BCP-MCNC: 3.5 G/DL (ref 3.5–5.2)
ALP SERPL-CCNC: 90 U/L (ref 55–135)
ALT SERPL W/O P-5'-P-CCNC: 41 U/L (ref 10–44)
ANION GAP SERPL CALC-SCNC: 5 MMOL/L (ref 8–16)
AST SERPL-CCNC: 57 U/L (ref 10–40)
BASOPHILS # BLD AUTO: 0.04 K/UL (ref 0–0.2)
BASOPHILS NFR BLD: 0.8 % (ref 0–1.9)
BILIRUB SERPL-MCNC: 0.6 MG/DL (ref 0.1–1)
BUN SERPL-MCNC: 20 MG/DL (ref 8–23)
CALCIUM SERPL-MCNC: 9.4 MG/DL (ref 8.7–10.5)
CHLORIDE SERPL-SCNC: 106 MMOL/L (ref 95–110)
CO2 SERPL-SCNC: 29 MMOL/L (ref 23–29)
CREAT SERPL-MCNC: 1.1 MG/DL (ref 0.5–1.4)
DIFFERENTIAL METHOD BLD: ABNORMAL
EOSINOPHIL # BLD AUTO: 0.3 K/UL (ref 0–0.5)
EOSINOPHIL NFR BLD: 5.2 % (ref 0–8)
ERYTHROCYTE [DISTWIDTH] IN BLOOD BY AUTOMATED COUNT: 13.3 % (ref 11.5–14.5)
EST. GFR  (NO RACE VARIABLE): 52 ML/MIN/1.73 M^2
GLUCOSE SERPL-MCNC: 86 MG/DL (ref 70–110)
HCT VFR BLD AUTO: 34.9 % (ref 37–48.5)
HGB BLD-MCNC: 10.9 G/DL (ref 12–16)
IMM GRANULOCYTES # BLD AUTO: 0.01 K/UL (ref 0–0.04)
IMM GRANULOCYTES NFR BLD AUTO: 0.2 % (ref 0–0.5)
LYMPHOCYTES # BLD AUTO: 1.4 K/UL (ref 1–4.8)
LYMPHOCYTES NFR BLD: 27.5 % (ref 18–48)
MCH RBC QN AUTO: 29.5 PG (ref 27–31)
MCHC RBC AUTO-ENTMCNC: 31.2 G/DL (ref 32–36)
MCV RBC AUTO: 94 FL (ref 82–98)
MONOCYTES # BLD AUTO: 0.7 K/UL (ref 0.3–1)
MONOCYTES NFR BLD: 13 % (ref 4–15)
NEUTROPHILS # BLD AUTO: 2.8 K/UL (ref 1.8–7.7)
NEUTROPHILS NFR BLD: 53.3 % (ref 38–73)
NRBC BLD-RTO: 0 /100 WBC
PLATELET # BLD AUTO: 192 K/UL (ref 150–450)
PMV BLD AUTO: 9.4 FL (ref 9.2–12.9)
POTASSIUM SERPL-SCNC: 4.4 MMOL/L (ref 3.5–5.1)
PROT SERPL-MCNC: 8 G/DL (ref 6–8.4)
RBC # BLD AUTO: 3.7 M/UL (ref 4–5.4)
SODIUM SERPL-SCNC: 140 MMOL/L (ref 136–145)
WBC # BLD AUTO: 5.17 K/UL (ref 3.9–12.7)

## 2024-01-29 PROCEDURE — 99213 OFFICE O/P EST LOW 20 MIN: CPT | Mod: PBBFAC | Performed by: INTERNAL MEDICINE

## 2024-01-29 PROCEDURE — 36415 COLL VENOUS BLD VENIPUNCTURE: CPT | Performed by: INTERNAL MEDICINE

## 2024-01-29 PROCEDURE — 99213 OFFICE O/P EST LOW 20 MIN: CPT | Mod: S$PBB,,, | Performed by: INTERNAL MEDICINE

## 2024-01-29 PROCEDURE — 99999 PR PBB SHADOW E&M-EST. PATIENT-LVL III: CPT | Mod: PBBFAC,,, | Performed by: INTERNAL MEDICINE

## 2024-01-29 PROCEDURE — 85025 COMPLETE CBC W/AUTO DIFF WBC: CPT | Performed by: INTERNAL MEDICINE

## 2024-01-29 PROCEDURE — 80053 COMPREHEN METABOLIC PANEL: CPT | Performed by: INTERNAL MEDICINE

## 2024-01-29 NOTE — PROGRESS NOTES
Subjective:     Julianna Montemayor is here for follow up of autoimmune hepatitis      HPI  Since Julianna Montemayor's last visit she denies any significant issues. Overall she is doing well. Weight is stable. She reports compliance with meds and no infections or hospitalizations.    Review of Systems    Objective:     Physical Exam  Vitals reviewed.   Constitutional:       General: She is not in acute distress.     Appearance: She is well-developed.   HENT:      Head: Normocephalic and atraumatic.      Mouth/Throat:      Pharynx: No oropharyngeal exudate.   Eyes:      General: No scleral icterus.        Right eye: No discharge.         Left eye: No discharge.      Conjunctiva/sclera: Conjunctivae normal.      Pupils: Pupils are equal, round, and reactive to light.   Pulmonary:      Effort: Pulmonary effort is normal. No respiratory distress.      Breath sounds: Normal breath sounds. No wheezing.   Abdominal:      General: There is no distension.      Palpations: Abdomen is soft.      Tenderness: There is no abdominal tenderness.   Musculoskeletal:      Right lower leg: No edema.      Left lower leg: No edema.   Neurological:      Mental Status: She is alert and oriented to person, place, and time.   Psychiatric:         Behavior: Behavior normal.         Computed MELD 3.0 unavailable. Necessary lab results were not found in the last year.  Computed MELD-Na unavailable. Necessary lab results were not found in the last year.      WBC   Date Value Ref Range Status   07/19/2023 5.31 3.90 - 12.70 K/uL Final     Hemoglobin   Date Value Ref Range Status   07/19/2023 10.4 (L) 12.0 - 16.0 g/dL Final     Hematocrit   Date Value Ref Range Status   07/19/2023 32.5 (L) 37.0 - 48.5 % Final     Platelets   Date Value Ref Range Status   07/19/2023 187 150 - 450 K/uL Final     BUN   Date Value Ref Range Status   07/19/2023 24 (H) 8 - 23 mg/dL Final     Creatinine   Date Value Ref Range Status   07/19/2023 1.3 0.5 - 1.4 mg/dL Final      Glucose   Date Value Ref Range Status   07/19/2023 88 70 - 110 mg/dL Final     Calcium   Date Value Ref Range Status   07/19/2023 9.6 8.7 - 10.5 mg/dL Final     Sodium   Date Value Ref Range Status   07/19/2023 141 136 - 145 mmol/L Final     Potassium   Date Value Ref Range Status   07/19/2023 4.0 3.5 - 5.1 mmol/L Final     Chloride   Date Value Ref Range Status   07/19/2023 107 95 - 110 mmol/L Final     AST   Date Value Ref Range Status   07/19/2023 17 10 - 40 U/L Final     ALT   Date Value Ref Range Status   07/19/2023 10 10 - 44 U/L Final     Alkaline Phosphatase   Date Value Ref Range Status   07/19/2023 40 (L) 55 - 135 U/L Final     Total Bilirubin   Date Value Ref Range Status   07/19/2023 0.5 0.1 - 1.0 mg/dL Final     Comment:     For infants and newborns, interpretation of results should be based  on gestational age, weight and in agreement with clinical  observations.    Premature Infant recommended reference ranges:  Up to 24 hours.............<8.0 mg/dL  Up to 48 hours............<12.0 mg/dL  3-5 days..................<15.0 mg/dL  6-29 days.................<15.0 mg/dL       Albumin   Date Value Ref Range Status   07/19/2023 3.7 3.5 - 5.2 g/dL Final     INR   Date Value Ref Range Status   11/21/2019 1.0 0.8 - 1.2 Final     Comment:     Coumadin Therapy:  2.0 - 3.0 for INR for all indicators except mechanical heart valves  and antiphospholipid syndromes which should use 2.5 - 3.5.           Assessment/Plan:     1. Autoimmune hepatitis      Julianna Montemayor is a 73 y.o. female withautoimmune hepatitis    Autoimmune hepatitis  -need updated labs  -continue on AZA and low dose pred  -     Comprehensive Metabolic Panel; Standing  -     CBC Auto Differential; Standing    RTC in 6 months with preclinic labs      Madison Carrillo MD

## 2024-02-07 DIAGNOSIS — K75.4 AUTOIMMUNE HEPATITIS: ICD-10-CM

## 2024-02-07 RX ORDER — PREDNISONE 2.5 MG/1
2.5 TABLET ORAL
Qty: 90 TABLET | Refills: 3 | Status: SHIPPED | OUTPATIENT
Start: 2024-02-07

## 2024-02-07 RX ORDER — AZATHIOPRINE 50 MG/1
50 TABLET ORAL
Qty: 90 TABLET | Refills: 3 | Status: SHIPPED | OUTPATIENT
Start: 2024-02-07

## 2024-02-29 ENCOUNTER — LAB VISIT (OUTPATIENT)
Dept: LAB | Facility: HOSPITAL | Age: 74
End: 2024-02-29
Attending: INTERNAL MEDICINE
Payer: MEDICARE

## 2024-02-29 DIAGNOSIS — K75.4 AUTOIMMUNE HEPATITIS: ICD-10-CM

## 2024-02-29 LAB
ALBUMIN SERPL BCP-MCNC: 3.5 G/DL (ref 3.5–5.2)
ALP SERPL-CCNC: 106 U/L (ref 55–135)
ALT SERPL W/O P-5'-P-CCNC: 31 U/L (ref 10–44)
AST SERPL-CCNC: 46 U/L (ref 10–40)
BILIRUB DIRECT SERPL-MCNC: 0.2 MG/DL (ref 0.1–0.3)
BILIRUB SERPL-MCNC: 0.6 MG/DL (ref 0.1–1)
PROT SERPL-MCNC: 8.3 G/DL (ref 6–8.4)

## 2024-02-29 PROCEDURE — 36415 COLL VENOUS BLD VENIPUNCTURE: CPT | Mod: PO | Performed by: INTERNAL MEDICINE

## 2024-02-29 PROCEDURE — 80076 HEPATIC FUNCTION PANEL: CPT | Performed by: INTERNAL MEDICINE

## 2024-03-01 ENCOUNTER — OFFICE VISIT (OUTPATIENT)
Dept: FAMILY MEDICINE | Facility: CLINIC | Age: 74
End: 2024-03-01
Payer: MEDICARE

## 2024-03-01 VITALS
DIASTOLIC BLOOD PRESSURE: 72 MMHG | SYSTOLIC BLOOD PRESSURE: 120 MMHG | BODY MASS INDEX: 21.74 KG/M2 | HEIGHT: 65 IN | OXYGEN SATURATION: 97 % | WEIGHT: 130.5 LBS | TEMPERATURE: 99 F | HEART RATE: 70 BPM

## 2024-03-01 DIAGNOSIS — N18.31 CHRONIC KIDNEY DISEASE, STAGE 3A: ICD-10-CM

## 2024-03-01 DIAGNOSIS — N81.10 PROLAPSE OF ANTERIOR VAGINAL WALL: Primary | ICD-10-CM

## 2024-03-01 DIAGNOSIS — D84.9 IMMUNOSUPPRESSED STATUS: ICD-10-CM

## 2024-03-01 PROCEDURE — 99213 OFFICE O/P EST LOW 20 MIN: CPT | Mod: S$PBB,,, | Performed by: FAMILY MEDICINE

## 2024-03-01 PROCEDURE — 99999 PR PBB SHADOW E&M-EST. PATIENT-LVL III: CPT | Mod: PBBFAC,,, | Performed by: FAMILY MEDICINE

## 2024-03-01 PROCEDURE — 99213 OFFICE O/P EST LOW 20 MIN: CPT | Mod: PBBFAC,PO | Performed by: FAMILY MEDICINE

## 2024-03-01 NOTE — PROGRESS NOTES
CHIEF COMPLAINT:  This is a 73-year-old female complaining discomfort in the genital area.    SUBJECTIVE:  The patient reports a feeling of bulging or protrusion in the vaginal area noted over the last 1-2 weeks.  She denies dysuria, hematuria or urinary frequency.  Patient denies vaginal discharge, itching or odor.  The patient is status post abdominal hysterectomy.    The patient's blood pressure is controlled on lisinopril HCT 10-12.5 mg daily. Her BP today is 120/72. She has chronic kidney disease stage 3b. She is taking Imuran and oral steroids for autoimmune hepatitis. She has chronic anemia. The patient has osteopenia but refuses bisphosphonates.       ROS:  GENERAL: Patient denies fever, chills, night sweats. Patient denies weight loss. Patient denies anorexia, fatigue, weakness or swollen glands.  SKIN: Patient denies rash or hair loss.  HEENT: Patient denies sore throat, ear pain, hearing loss, nasal congestion, or runny nose. Patient denies visual disturbance, eye irritation or discharge.  LUNGS: Patient denies cough, wheeze or hemoptysis.  CARDIOVASCULAR: Patient denies chest pain, shortness of breath, palpitations, syncope or lower extremity edema.  GI: Patient denies abdominal pain, nausea, vomiting, diarrhea, constipation, blood in stool or melena.  GENITOURINARY: Patient denies pelvic pain, vaginal discharge, itch or odor. Patient denies irregular vaginal bleeding. Patient denies dysuria, frequency, hematuria, nocturia, urgency or incontinence.  MUSCULOSKELETAL: Patient denies joint swelling, redness or warmth. Positive for joint pain.  NEUROLOGIC: Patient denies headache, vertigo, paresthesias, weakness in limb, dysarthria, dysphagia or abnormality of gait.  PSYCHIATRIC: Patient denies anxiety, depression, or memory loss.     OBJECTIVE:   GENERAL: Well-developed well-nourished black female alert and oriented x3, in no acute distress. Memory, judgment and cognition without deficit.  SKIN: Clear  without rash.  Normal color and tone.  HEENT: Eyes: Clear conjunctivae.  No scleral icterus.  NECK: Supple, normal range of motion.  LUNGS:  Normal respiratory effort.  ABDOMEN: Normal appearance. Active bowel sounds. Soft, nontender without mass or organomegaly. No rebound or guarding.  EXTREMITIES: Without cyanosis, clubbing or edema.  Normal range of motion in all extremities. No joint effusion, erythema or warmth.  NEUROLOGIC:  Gait without abnormality. No tremor.  PELVIC:  External:  Without inflammation, rash or lesions.  Mild bulging at introitus which worsens with Valsalva maneuver.  Vaginal:  Anterior wall prolapse.  Cuff intact.  Bimanual:  No palpable masses or tenderness.    ASSESSMENT:  1. Prolapse of anterior vaginal wall    2. Chronic kidney disease, stage 3a    3. Immunosuppressed status      PLAN:  1. Discussed etiology and treatment options for vaginal prolapse.    2. Reviewed the use of pessary.  Patient declines OBGYN consult at this time.  3. May use barrier ointments such as petroleum jelly or zinc oxide for irritation.  4.  Follow-up if no improvement or worsening symptoms.      20 minutes of total time spent on the encounter, which includes face to face time and non-face to face time preparing to see the patient (eg, review of tests), Obtaining and/or reviewing separately obtained history, Documenting clinical information in the electronic or other health record, Independently interpreting results (not separately reported) and communicating results to the patient/family/caregiver, or Care coordination (not separately reported).     This note is generated with speech recognition software and is subject to transcription error and sound alike phrases that may be missed by proofreading.

## 2024-06-18 ENCOUNTER — OFFICE VISIT (OUTPATIENT)
Dept: INTERNAL MEDICINE | Facility: CLINIC | Age: 74
End: 2024-06-18
Payer: MEDICARE

## 2024-06-18 ENCOUNTER — OFFICE VISIT (OUTPATIENT)
Dept: OPHTHALMOLOGY | Facility: CLINIC | Age: 74
End: 2024-06-18
Payer: MEDICARE

## 2024-06-18 VITALS
SYSTOLIC BLOOD PRESSURE: 102 MMHG | HEIGHT: 65 IN | DIASTOLIC BLOOD PRESSURE: 74 MMHG | TEMPERATURE: 97 F | HEART RATE: 76 BPM | RESPIRATION RATE: 20 BRPM | BODY MASS INDEX: 21.86 KG/M2 | WEIGHT: 131.19 LBS

## 2024-06-18 DIAGNOSIS — N18.31 CHRONIC KIDNEY DISEASE, STAGE 3A: Chronic | ICD-10-CM

## 2024-06-18 DIAGNOSIS — H25.11 SENILE NUCLEAR CATARACT, RIGHT: ICD-10-CM

## 2024-06-18 DIAGNOSIS — Z00.00 ENCOUNTER FOR PREVENTIVE HEALTH EXAMINATION: Primary | ICD-10-CM

## 2024-06-18 DIAGNOSIS — H52.7 REFRACTION DISORDER: ICD-10-CM

## 2024-06-18 DIAGNOSIS — D84.9 IMMUNOSUPPRESSED STATUS: ICD-10-CM

## 2024-06-18 DIAGNOSIS — H04.123 DRY EYE SYNDROME, BILATERAL: ICD-10-CM

## 2024-06-18 DIAGNOSIS — M85.80 OSTEOPENIA, UNSPECIFIED LOCATION: ICD-10-CM

## 2024-06-18 DIAGNOSIS — E55.9 VITAMIN D DEFICIENCY DISEASE: ICD-10-CM

## 2024-06-18 DIAGNOSIS — H25.12 SENILE NUCLEAR CATARACT, LEFT: Primary | ICD-10-CM

## 2024-06-18 DIAGNOSIS — I10 ESSENTIAL HYPERTENSION: Chronic | ICD-10-CM

## 2024-06-18 DIAGNOSIS — K75.4 AUTOIMMUNE HEPATITIS: Chronic | ICD-10-CM

## 2024-06-18 DIAGNOSIS — F41.8 ANXIETY ASSOCIATED WITH DEPRESSION: ICD-10-CM

## 2024-06-18 DIAGNOSIS — E78.5 HYPERLIPIDEMIA, UNSPECIFIED HYPERLIPIDEMIA TYPE: Chronic | ICD-10-CM

## 2024-06-18 PROCEDURE — 99213 OFFICE O/P EST LOW 20 MIN: CPT | Mod: PBBFAC,27 | Performed by: NURSE PRACTITIONER

## 2024-06-18 PROCEDURE — 99212 OFFICE O/P EST SF 10 MIN: CPT | Mod: PBBFAC | Performed by: OPHTHALMOLOGY

## 2024-06-18 PROCEDURE — G0439 PPPS, SUBSEQ VISIT: HCPCS | Mod: ,,, | Performed by: NURSE PRACTITIONER

## 2024-06-18 PROCEDURE — 99999 PR PBB SHADOW E&M-EST. PATIENT-LVL III: CPT | Mod: PBBFAC,,, | Performed by: NURSE PRACTITIONER

## 2024-06-18 PROCEDURE — 99999 PR PBB SHADOW E&M-EST. PATIENT-LVL II: CPT | Mod: PBBFAC,,, | Performed by: OPHTHALMOLOGY

## 2024-06-18 PROCEDURE — 92014 COMPRE OPH EXAM EST PT 1/>: CPT | Mod: S$PBB,,, | Performed by: OPHTHALMOLOGY

## 2024-06-18 NOTE — PROGRESS NOTES
"  Julianna Montemayor presented for a follow-up Medicare AWV today. The following components were reviewed and updated:    Medical history  Family History  Social history  Allergies and Current Medications  Health Risk Assessment  Health Maintenance  Care Team    **See Completed Assessments for Annual Wellness visit with in the encounter summary    The following assessments were completed:  Depression Screening  Cognitive function Screening  Timed Get Up Test  Whisper Test      Opioid documentation:      Patient does not have a current opioid prescription.          Vitals:    06/18/24 0835   BP: 102/74   Pulse: 76   Resp: 20   Temp: 97.4 °F (36.3 °C)   Weight: 59.5 kg (131 lb 2.8 oz)   Height: 5' 5" (1.651 m)     Body mass index is 21.83 kg/m².       Physical Exam  Vitals and nursing note reviewed.   Constitutional:       Appearance: Normal appearance.   HENT:      Head: Normocephalic.   Eyes:      Pupils: Pupils are equal, round, and reactive to light.   Cardiovascular:      Rate and Rhythm: Normal rate and regular rhythm.      Pulses: Normal pulses.      Heart sounds: Normal heart sounds.   Pulmonary:      Effort: Pulmonary effort is normal.      Breath sounds: Normal breath sounds.   Musculoskeletal:         General: Normal range of motion.   Skin:     General: Skin is warm.   Neurological:      Mental Status: She is alert and oriented to person, place, and time.   Psychiatric:         Mood and Affect: Mood normal.         Behavior: Behavior normal.         Thought Content: Thought content normal.         Judgment: Judgment normal.     Current Outpatient Medications   Medication Instructions    azaTHIOprine (IMURAN) 50 mg, Oral    calcium carb/vit D3/minerals (CALCIUM-VITAMIN D ORAL) 1 tablet, Oral, Daily, Calcium 600 and vitamin d 800IU/day    cholecalciferol (vitamin D3) 1,000 Units, Oral, Daily    lisinopriL-hydrochlorothiazide (PRINZIDE,ZESTORETIC) 10-12.5 mg per tablet 1 tablet, Oral, Daily    predniSONE " (DELTASONE) 2.5 mg, Oral           Diagnoses and health risks identified today and associated recommendations/orders:  1. Encounter for preventive health examination  RSV  Covid  Shingles  Flu vaccine discussed      2. Autoimmune hepatitis  Chronic and Stable on Imuran. Continue current treatment plan as previously prescribed with your hep dept    3. Immunosuppressed status  Chronic and Stable on Imuran/steriod. Continue current treatment plan as previously prescribed with your hep dept      4. Hyperlipidemia, unspecified hyperlipidemia type  Chronic and Stable  with diet. Continue current treatment plan as previously prescribed with your PCP      5. Essential hypertension  Chronic and Stable on Zeostertic. Continue current treatment plan as previously prescribed with your PCP      6. Chronic kidney disease, stage 3a  Chronic and Stable on Imuran. Continue current treatment plan as previously prescribed with your hep dept      7. Anxiety associated with depression  Chronic and Stable with no meds. Continue current treatment plan as previously prescribed with your PCP      8. Osteopenia, unspecified location  Chronic and Stable on vitamin D and calium- UTD dexa. Continue current treatment plan as previously prescribed with your PCP      9. Vitamin D deficiency disease  Chronic and Stable on vitamin D and calium- UTD dexa. Continue current treatment plan as previously prescribed with your PCP    Provided Julianna with a 5-10 year written screening schedule and personal prevention plan. Recommendations were developed using the USPSTF age appropriate recommendations. Education, counseling, and referrals were provided as needed.  After Visit Summary printed and given to patient which includes a list of additional screenings\tests needed.    Follow up in about 1 year (around 6/18/2025) for Follow up with PCP.    Tianna Hilario NP

## 2024-06-18 NOTE — PROGRESS NOTES
HPI     Annual Exam            Comments: Pt reports her right eye feels like there is something in it x   1 month. States she would like new eyeglasses, she has not had them   changed in a while.           Comments    DNL patient    1. Cataracts OU             Last edited by Jessica Garzon on 6/18/2024  9:22 AM.            Assessment /Plan     For exam results, see Encounter Report.      ICD-10-CM ICD-9-CM    1. Senile nuclear cataract, left  H25.12 366.16 Mild both eyes   You were found to have an early cataract in your eye(s) today. However the cataract is not affecting your activities of daily living, such as reading and driving. You do not need surgery at this time. We will recheck your cataract at your next visit. You are welcome to call for an earlier appointment if your vision gets worse.         2. Senile nuclear cataract, right  H25.11 366.16 See above       3. Dry eye syndrome, bilateral  H04.123 375.15 Mild, use Systane Ultra tears       4. Refraction disorder  H52.7 367.9 Disp New glasses           RETURN TO CLINIC 1 year

## 2024-06-18 NOTE — PATIENT INSTRUCTIONS
Counseling and Referral of Other Preventative  (Italic type indicates deductible and co-insurance are waived)    Patient Name: Julianna Montemayor  Today's Date: 6/18/2024    Health Maintenance       Date Due Completion Date    Annual UACr Never done ---    Shingles Vaccine (1 of 2) Never done ---    RSV Vaccine (Age 60+ and Pregnant patients) (1 - 1-dose 60+ series) Never done ---    COVID-19 Vaccine (3 - Moderna risk series) 01/01/2022 12/4/2021    TETANUS VACCINE 04/27/2022 4/27/2012 (Done)    Override on 4/27/2012: Done    Influenza Vaccine (Season Ended) 09/01/2024 11/8/2022    DEXA Scan 08/19/2026 8/19/2021    Override on 5/8/2012: Done    Override on 11/6/2009: Done    Colorectal Cancer Screening 05/12/2027 5/12/2022    Lipid Panel 08/25/2028 8/25/2023        No orders of the defined types were placed in this encounter.    The following information is provided to all patients.  This information is to help you find resources for any of the problems found today that may be affecting your health:                  Living healthy guide: www.[facility].[place].gov      Understanding Diabetes: www.diabetes.org      Eating healthy: www.cdc.gov/healthyweight      CDC home safety checklist: www.cdc.gov/steadi/patient.html      Agency on Aging: www.goea.[place].[facility]      Alcoholics anonymous (AA): www.aa.org      Physical Activity: www.romy.nih.gov/bi4wtuk      Tobacco use: www.quitwithusla.org

## 2024-07-24 ENCOUNTER — LAB VISIT (OUTPATIENT)
Dept: LAB | Facility: HOSPITAL | Age: 74
End: 2024-07-24
Attending: INTERNAL MEDICINE
Payer: MEDICARE

## 2024-07-24 DIAGNOSIS — K75.4 AUTOIMMUNE HEPATITIS: ICD-10-CM

## 2024-07-24 LAB
ALBUMIN SERPL BCP-MCNC: 3.4 G/DL (ref 3.5–5.2)
ALP SERPL-CCNC: 59 U/L (ref 55–135)
ALT SERPL W/O P-5'-P-CCNC: 10 U/L (ref 10–44)
ANION GAP SERPL CALC-SCNC: 7 MMOL/L (ref 8–16)
AST SERPL-CCNC: 24 U/L (ref 10–40)
BASOPHILS # BLD AUTO: 0.03 K/UL (ref 0–0.2)
BASOPHILS NFR BLD: 0.6 % (ref 0–1.9)
BILIRUB SERPL-MCNC: 0.6 MG/DL (ref 0.1–1)
BUN SERPL-MCNC: 26 MG/DL (ref 8–23)
CALCIUM SERPL-MCNC: 9.2 MG/DL (ref 8.7–10.5)
CHLORIDE SERPL-SCNC: 105 MMOL/L (ref 95–110)
CO2 SERPL-SCNC: 26 MMOL/L (ref 23–29)
CREAT SERPL-MCNC: 1.2 MG/DL (ref 0.5–1.4)
DIFFERENTIAL METHOD BLD: ABNORMAL
EOSINOPHIL # BLD AUTO: 0.2 K/UL (ref 0–0.5)
EOSINOPHIL NFR BLD: 3.7 % (ref 0–8)
ERYTHROCYTE [DISTWIDTH] IN BLOOD BY AUTOMATED COUNT: 13.5 % (ref 11.5–14.5)
EST. GFR  (NO RACE VARIABLE): 47 ML/MIN/1.73 M^2
GLUCOSE SERPL-MCNC: 79 MG/DL (ref 70–110)
HCT VFR BLD AUTO: 32.3 % (ref 37–48.5)
HGB BLD-MCNC: 9.8 G/DL (ref 12–16)
IMM GRANULOCYTES # BLD AUTO: 0.01 K/UL (ref 0–0.04)
IMM GRANULOCYTES NFR BLD AUTO: 0.2 % (ref 0–0.5)
LYMPHOCYTES # BLD AUTO: 1.4 K/UL (ref 1–4.8)
LYMPHOCYTES NFR BLD: 29.1 % (ref 18–48)
MCH RBC QN AUTO: 29.2 PG (ref 27–31)
MCHC RBC AUTO-ENTMCNC: 30.3 G/DL (ref 32–36)
MCV RBC AUTO: 96 FL (ref 82–98)
MONOCYTES # BLD AUTO: 0.6 K/UL (ref 0.3–1)
MONOCYTES NFR BLD: 11.4 % (ref 4–15)
NEUTROPHILS # BLD AUTO: 2.6 K/UL (ref 1.8–7.7)
NEUTROPHILS NFR BLD: 55 % (ref 38–73)
NRBC BLD-RTO: 0 /100 WBC
PLATELET # BLD AUTO: 186 K/UL (ref 150–450)
PMV BLD AUTO: 10.1 FL (ref 9.2–12.9)
POTASSIUM SERPL-SCNC: 4 MMOL/L (ref 3.5–5.1)
PROT SERPL-MCNC: 7.9 G/DL (ref 6–8.4)
RBC # BLD AUTO: 3.36 M/UL (ref 4–5.4)
SODIUM SERPL-SCNC: 138 MMOL/L (ref 136–145)
WBC # BLD AUTO: 4.81 K/UL (ref 3.9–12.7)

## 2024-07-24 PROCEDURE — 85025 COMPLETE CBC W/AUTO DIFF WBC: CPT | Performed by: INTERNAL MEDICINE

## 2024-07-24 PROCEDURE — 36415 COLL VENOUS BLD VENIPUNCTURE: CPT | Mod: PO | Performed by: INTERNAL MEDICINE

## 2024-07-24 PROCEDURE — 80053 COMPREHEN METABOLIC PANEL: CPT | Performed by: INTERNAL MEDICINE

## 2024-07-31 ENCOUNTER — OFFICE VISIT (OUTPATIENT)
Dept: HEPATOLOGY | Facility: CLINIC | Age: 74
End: 2024-07-31
Payer: MEDICARE

## 2024-07-31 VITALS
WEIGHT: 132.25 LBS | BODY MASS INDEX: 22.01 KG/M2 | HEART RATE: 67 BPM | SYSTOLIC BLOOD PRESSURE: 109 MMHG | DIASTOLIC BLOOD PRESSURE: 67 MMHG

## 2024-07-31 DIAGNOSIS — K75.4 AUTOIMMUNE HEPATITIS: Primary | ICD-10-CM

## 2024-07-31 DIAGNOSIS — D64.9 ANEMIA, UNSPECIFIED TYPE: ICD-10-CM

## 2024-07-31 PROCEDURE — 99214 OFFICE O/P EST MOD 30 MIN: CPT | Mod: S$PBB,,, | Performed by: INTERNAL MEDICINE

## 2024-07-31 PROCEDURE — 99999 PR PBB SHADOW E&M-EST. PATIENT-LVL III: CPT | Mod: PBBFAC,,, | Performed by: INTERNAL MEDICINE

## 2024-07-31 PROCEDURE — 99213 OFFICE O/P EST LOW 20 MIN: CPT | Mod: PBBFAC | Performed by: INTERNAL MEDICINE

## 2024-07-31 NOTE — PROGRESS NOTES
Subjective:     Julianna Montemayor is here for follow up of Follow-up and autoimmune hepatitis      HPI  Since Julianna Montemayor's last visit She denies any major liver related issues.  For the most part she has been feeling well.  She has been compliant with her medications.  She denies any hospitalizations, infections or new diagnoses.    She denies any overt bleeding    Review of Systems    Objective:     Physical Exam  Vitals reviewed.   Constitutional:       General: She is not in acute distress.     Appearance: She is well-developed.   HENT:      Head: Normocephalic and atraumatic.      Mouth/Throat:      Pharynx: No oropharyngeal exudate.   Eyes:      General: No scleral icterus.        Right eye: No discharge.         Left eye: No discharge.      Conjunctiva/sclera: Conjunctivae normal.      Pupils: Pupils are equal, round, and reactive to light.   Pulmonary:      Effort: Pulmonary effort is normal. No respiratory distress.      Breath sounds: Normal breath sounds. No wheezing.   Abdominal:      General: There is no distension.      Palpations: Abdomen is soft.      Tenderness: There is no abdominal tenderness.   Musculoskeletal:      Right lower leg: No edema.      Left lower leg: No edema.   Neurological:      Mental Status: She is alert and oriented to person, place, and time.   Psychiatric:         Behavior: Behavior normal.         Computed MELD 3.0 unavailable. One or more values for this score either were not found within the given timeframe or did not fit some other criterion.  Computed MELD-Na unavailable. One or more values for this score either were not found within the given timeframe or did not fit some other criterion.      WBC   Date Value Ref Range Status   07/24/2024 4.81 3.90 - 12.70 K/uL Final     Hemoglobin   Date Value Ref Range Status   07/24/2024 9.8 (L) 12.0 - 16.0 g/dL Final     Hematocrit   Date Value Ref Range Status   07/24/2024 32.3 (L) 37.0 - 48.5 % Final     Platelets   Date Value  Ref Range Status   07/24/2024 186 150 - 450 K/uL Final     BUN   Date Value Ref Range Status   07/24/2024 26 (H) 8 - 23 mg/dL Final     Creatinine   Date Value Ref Range Status   07/24/2024 1.2 0.5 - 1.4 mg/dL Final     Glucose   Date Value Ref Range Status   07/24/2024 79 70 - 110 mg/dL Final     Calcium   Date Value Ref Range Status   07/24/2024 9.2 8.7 - 10.5 mg/dL Final     Sodium   Date Value Ref Range Status   07/24/2024 138 136 - 145 mmol/L Final     Potassium   Date Value Ref Range Status   07/24/2024 4.0 3.5 - 5.1 mmol/L Final     Chloride   Date Value Ref Range Status   07/24/2024 105 95 - 110 mmol/L Final     AST   Date Value Ref Range Status   07/24/2024 24 10 - 40 U/L Final     ALT   Date Value Ref Range Status   07/24/2024 10 10 - 44 U/L Final     Alkaline Phosphatase   Date Value Ref Range Status   07/24/2024 59 55 - 135 U/L Final     Total Bilirubin   Date Value Ref Range Status   07/24/2024 0.6 0.1 - 1.0 mg/dL Final     Comment:     For infants and newborns, interpretation of results should be based  on gestational age, weight and in agreement with clinical  observations.    Premature Infant recommended reference ranges:  Up to 24 hours.............<8.0 mg/dL  Up to 48 hours............<12.0 mg/dL  3-5 days..................<15.0 mg/dL  6-29 days.................<15.0 mg/dL       Albumin   Date Value Ref Range Status   07/24/2024 3.4 (L) 3.5 - 5.2 g/dL Final     INR   Date Value Ref Range Status   11/21/2019 1.0 0.8 - 1.2 Final     Comment:     Coumadin Therapy:  2.0 - 3.0 for INR for all indicators except mechanical heart valves  and antiphospholipid syndromes which should use 2.5 - 3.5.           Assessment/Plan:     1. Autoimmune hepatitis    2. Anemia, unspecified type      Julianna Montemayor is a 73 y.o. female withFollow-up and autoimmune hepatitis    Autoimmune hepatitis-disease seems controlled.  -continue on both azathioprine and low-dose prednisone  -monitor labs  -     Comprehensive  Metabolic Panel; Future; Expected date: 07/31/2024  -     CBC Auto Differential; Future; Expected date: 07/31/2024  -     CBC Auto Differential; Future; Expected date: 07/31/2024  -     PRO-Predict Metabolites (thiopurine therapy); Future; Expected date: 07/31/2024    Anemia, unspecified type-unclear why hemoglobin is trending down; it could be medication related  -will repeat labs in 4 weeks and also make sure azathioprine level is not contributing to the cytopenia  -     CBC Auto Differential; Future; Expected date: 07/31/2024  -     PRO-Predict Metabolites (thiopurine therapy); Future; Expected date: 07/31/2024      Return to clinic in 6 months with preclinic labs    Madison Carrillo MD

## 2024-08-17 RX ORDER — LISINOPRIL AND HYDROCHLOROTHIAZIDE 10; 12.5 MG/1; MG/1
1 TABLET ORAL
Qty: 90 TABLET | Refills: 1 | Status: SHIPPED | OUTPATIENT
Start: 2024-08-17

## 2024-08-17 NOTE — TELEPHONE ENCOUNTER
No care due was identified.  Health [facility] Embedded Care Due Messages. Reference number: [id number].   8/17/2024 7:00:34 AM CDT

## 2024-08-17 NOTE — TELEPHONE ENCOUNTER
Refill Decision Note   Julianna Sim  is requesting a refill authorization.  Brief Assessment and Rationale for Refill:  Approve     Medication Therapy Plan:         Comments:     Note composed:6:02 PM 08/17/2024

## 2024-08-22 ENCOUNTER — OFFICE VISIT (OUTPATIENT)
Dept: FAMILY MEDICINE | Facility: CLINIC | Age: 74
End: 2024-08-22
Payer: MEDICARE

## 2024-08-22 VITALS
DIASTOLIC BLOOD PRESSURE: 80 MMHG | BODY MASS INDEX: 22.02 KG/M2 | HEART RATE: 84 BPM | OXYGEN SATURATION: 97 % | SYSTOLIC BLOOD PRESSURE: 130 MMHG | TEMPERATURE: 98 F | WEIGHT: 132.19 LBS | HEIGHT: 65 IN

## 2024-08-22 DIAGNOSIS — N18.31 CHRONIC KIDNEY DISEASE, STAGE 3A: Chronic | ICD-10-CM

## 2024-08-22 DIAGNOSIS — I10 ESSENTIAL HYPERTENSION: Primary | Chronic | ICD-10-CM

## 2024-08-22 DIAGNOSIS — E78.5 HYPERLIPIDEMIA, UNSPECIFIED HYPERLIPIDEMIA TYPE: Chronic | ICD-10-CM

## 2024-08-22 DIAGNOSIS — Z01.419 WELL FEMALE EXAM WITH ROUTINE GYNECOLOGICAL EXAM: ICD-10-CM

## 2024-08-22 DIAGNOSIS — K75.4 AUTOIMMUNE HEPATITIS: Chronic | ICD-10-CM

## 2024-08-22 DIAGNOSIS — D64.9 ANEMIA, UNSPECIFIED TYPE: ICD-10-CM

## 2024-08-22 PROCEDURE — G0101 CA SCREEN;PELVIC/BREAST EXAM: HCPCS | Mod: PBBFAC,PO | Performed by: FAMILY MEDICINE

## 2024-08-22 PROCEDURE — 99213 OFFICE O/P EST LOW 20 MIN: CPT | Mod: PBBFAC,PO | Performed by: FAMILY MEDICINE

## 2024-08-22 PROCEDURE — 99999 PR PBB SHADOW E&M-EST. PATIENT-LVL III: CPT | Mod: PBBFAC,,, | Performed by: FAMILY MEDICINE

## 2024-08-22 NOTE — PROGRESS NOTES
CHIEF COMPLAINT:  This is a 73-year-old female here for follow-up of chronic medical conditions and for preventive health exam.     SUBJECTIVE: Patient is doing well without complaints. The patient's blood pressure is controlled on lisinopril HCT 10-12.5 mg daily. Her BP today is 130/80. She has chronic kidney disease stage 3b. She is taking Imuran and oral steroids for autoimmune hepatitis. She has chronic anemia. The patient has osteopenia but refuses bisphosphonates.  She continues to have problems with vaginal prolapse.      Eye exam June 2024. Mammogram December 2015 (refuses mammograms). Bone DEXA scan August 2021.  Colonoscopy May 2022 due again May 2027. Tdap April 2012. Pneumovax April 2012. Flu vaccine, November 2022.  Prevnar vaccine December 2015. COVID 19 vaccine February, March, December 2021.     ROS:  GENERAL: Patient denies fever, chills, night sweats. Patient denies weight loss. Patient denies anorexia, fatigue, weakness or swollen glands.  SKIN: Patient denies rash or hair loss.  HEENT: Patient denies sore throat, ear pain, hearing loss, nasal congestion, or runny nose. Patient denies visual disturbance, eye irritation or discharge.  LUNGS: Patient denies cough, wheeze or hemoptysis.  CARDIOVASCULAR: Patient denies chest pain, shortness of breath, palpitations, syncope or lower extremity edema.  GI: Patient denies abdominal pain, nausea, vomiting, diarrhea, constipation, blood in stool or melena.  GENITOURINARY: Patient denies pelvic pain, vaginal discharge, itch or odor. Patient denies irregular vaginal bleeding. Patient denies dysuria, frequency, hematuria, nocturia, urgency or incontinence.  BREASTS: Patient denies breast pain, mass or nipple discharge.  MUSCULOSKELETAL: Patient denies joint swelling, redness or warmth. Positive for joint pain.  NEUROLOGIC: Patient denies headache, vertigo, paresthesias, weakness in limb, dysarthria, dysphagia or abnormality of gait.  PSYCHIATRIC: Patient  denies anxiety, depression, or memory loss.     OBJECTIVE:   GENERAL: Well-developed well-nourished black female alert and oriented x3, in no acute distress. Memory, judgment and cognition without deficit.  SKIN: Clear without rash. Normal color and tone. Lipoma left upper extremity. Ganglion cyst, left dorsal wrist. Small, movable nodule right dorsal foot.  Dry flaky skin, left foot.  HEENT: Eyes: Clear conjunctivae. Pupils equal reactive to light and accommodation. Ears: Clear TMs. Clear canals. Nose: Moderate nasal congestion, left greater than right. Pharynx: Without injection or exudates.  NECK: Supple, normal range of motion. No masses, nodes or enlarged thyroid. No JVD. Carotids 2+ and equal. No bruits.  LUNGS: Clear to auscultation. Normal respiratory effort.  CARDIOVASCULAR: Regular rhythm, normal S1, S2 without murmur, gallop or rub.  BACK: No CVA or spinal tenderness.  BREASTS: No masses, tenderness or nipple discharge.  ABDOMEN: Normal appearance. Active bowel sounds. Soft, nontender without mass or organomegaly. No rebound or guarding.  EXTREMITIES: Without cyanosis, clubbing or edema. Distal pulses 2+ and equal. Normal range of motion in all extremities. No joint effusion, erythema or warmth.  NEUROLOGIC: Cranial nerves II through XII slightly overweight without deficit. Motor strength equal bilaterally. Sensation normal to touch. Deep tendon reflexes 2+ and equal. Gait without abnormality. No tremor. Negative cerebellar signs.  PELVIC: External: Without lesions or inflammation.  Urethral meatus normal size and shape.  Vaginal: Clear.  No discharge.  Atrophic changes. Slight anterior wall prolapse. Cuff intact. Bimanual:  No palpable tenderness.  No palpable masses.  Rectovaginal: Confirms.  Normal anal sphincter tone.  Heme-negative stool x2.     ASSESSMENT:  1. Essential hypertension    2. Chronic kidney disease, stage 3a    3. Hyperlipidemia, unspecified hyperlipidemia type    4. Autoimmune  hepatitis    5. Anemia, unspecified type      PLAN:  1. Exercise regularly.  2. Age-appropriate counseling.  3. Fasting lab.  4. Mammogram declined.  5. Refill medications.  6. Follow up in 6-12 months.    30 minutes of total time spent on the encounter, which includes face to face time and non-face to face time preparing to see the patient (eg, review of tests), Obtaining and/or reviewing separately obtained history, Documenting clinical information in the electronic or other health record, Independently interpreting results (not separately reported) and communicating results to the patient/family/caregiver, or Care coordination (not separately reported).     This note is generated with speech recognition software and is subject to transcription error and sound alike phrases that may be missed by proofreading.

## 2024-08-26 ENCOUNTER — LAB VISIT (OUTPATIENT)
Dept: LAB | Facility: HOSPITAL | Age: 74
End: 2024-08-26
Attending: INTERNAL MEDICINE
Payer: MEDICARE

## 2024-08-26 DIAGNOSIS — D64.9 ANEMIA, UNSPECIFIED TYPE: ICD-10-CM

## 2024-08-26 DIAGNOSIS — E78.5 HYPERLIPIDEMIA, UNSPECIFIED HYPERLIPIDEMIA TYPE: Chronic | ICD-10-CM

## 2024-08-26 DIAGNOSIS — K75.4 AUTOIMMUNE HEPATITIS: ICD-10-CM

## 2024-08-26 LAB
ALBUMIN SERPL BCP-MCNC: 3.5 G/DL (ref 3.5–5.2)
ALP SERPL-CCNC: 49 U/L (ref 55–135)
ALT SERPL W/O P-5'-P-CCNC: 7 U/L (ref 10–44)
ANION GAP SERPL CALC-SCNC: 11 MMOL/L (ref 8–16)
AST SERPL-CCNC: 20 U/L (ref 10–40)
BILIRUB SERPL-MCNC: 0.8 MG/DL (ref 0.1–1)
BUN SERPL-MCNC: 16 MG/DL (ref 8–23)
CALCIUM SERPL-MCNC: 9.7 MG/DL (ref 8.7–10.5)
CHLORIDE SERPL-SCNC: 104 MMOL/L (ref 95–110)
CHOLEST SERPL-MCNC: 218 MG/DL (ref 120–199)
CHOLEST/HDLC SERPL: 3.2 {RATIO} (ref 2–5)
CO2 SERPL-SCNC: 24 MMOL/L (ref 23–29)
CREAT SERPL-MCNC: 1.1 MG/DL (ref 0.5–1.4)
EST. GFR  (NO RACE VARIABLE): 52 ML/MIN/1.73 M^2
FERRITIN SERPL-MCNC: 610 NG/ML (ref 20–300)
GLUCOSE SERPL-MCNC: 80 MG/DL (ref 70–110)
HDLC SERPL-MCNC: 69 MG/DL (ref 40–75)
HDLC SERPL: 31.7 % (ref 20–50)
IRON SERPL-MCNC: 73 UG/DL (ref 30–160)
LDLC SERPL CALC-MCNC: 134 MG/DL (ref 63–159)
NONHDLC SERPL-MCNC: 149 MG/DL
POTASSIUM SERPL-SCNC: 3.8 MMOL/L (ref 3.5–5.1)
PROT SERPL-MCNC: 8.1 G/DL (ref 6–8.4)
SATURATED IRON: 25 % (ref 20–50)
SODIUM SERPL-SCNC: 139 MMOL/L (ref 136–145)
TOTAL IRON BINDING CAPACITY: 290 UG/DL (ref 250–450)
TRANSFERRIN SERPL-MCNC: 196 MG/DL (ref 200–375)
TRIGL SERPL-MCNC: 75 MG/DL (ref 30–150)
TSH SERPL DL<=0.005 MIU/L-ACNC: 0.77 UIU/ML (ref 0.4–4)

## 2024-08-26 PROCEDURE — 84443 ASSAY THYROID STIM HORMONE: CPT | Performed by: FAMILY MEDICINE

## 2024-08-26 PROCEDURE — 80061 LIPID PANEL: CPT | Performed by: FAMILY MEDICINE

## 2024-08-26 PROCEDURE — 36415 COLL VENOUS BLD VENIPUNCTURE: CPT | Mod: PO | Performed by: INTERNAL MEDICINE

## 2024-08-26 PROCEDURE — 80053 COMPREHEN METABOLIC PANEL: CPT | Performed by: INTERNAL MEDICINE

## 2024-08-26 PROCEDURE — 84466 ASSAY OF TRANSFERRIN: CPT | Performed by: FAMILY MEDICINE

## 2024-08-26 PROCEDURE — 82728 ASSAY OF FERRITIN: CPT | Performed by: FAMILY MEDICINE

## 2024-08-29 DIAGNOSIS — K75.4 AUTOIMMUNE HEPATITIS: ICD-10-CM

## 2024-08-29 LAB
6-TGN ENTSUB RBC: 510 PMOL/8X10(8)RBC (ref 235–450)
6MMP ENTSUB RBC: <432 PMOL/8X10(8)RBC

## 2024-08-29 RX ORDER — AZATHIOPRINE 50 MG/1
50 TABLET ORAL DAILY
Start: 2024-08-29

## 2024-09-18 ENCOUNTER — LAB VISIT (OUTPATIENT)
Dept: LAB | Facility: HOSPITAL | Age: 74
End: 2024-09-18
Attending: INTERNAL MEDICINE
Payer: MEDICARE

## 2024-09-18 DIAGNOSIS — K75.4 AUTOIMMUNE HEPATITIS: ICD-10-CM

## 2024-09-18 LAB
ALBUMIN SERPL BCP-MCNC: 3.5 G/DL (ref 3.5–5.2)
ALP SERPL-CCNC: 53 U/L (ref 55–135)
ALT SERPL W/O P-5'-P-CCNC: 13 U/L (ref 10–44)
ANION GAP SERPL CALC-SCNC: 8 MMOL/L (ref 8–16)
AST SERPL-CCNC: 26 U/L (ref 10–40)
BASOPHILS # BLD AUTO: 0.04 K/UL (ref 0–0.2)
BASOPHILS NFR BLD: 0.9 % (ref 0–1.9)
BILIRUB SERPL-MCNC: 0.7 MG/DL (ref 0.1–1)
BUN SERPL-MCNC: 22 MG/DL (ref 8–23)
CALCIUM SERPL-MCNC: 9.5 MG/DL (ref 8.7–10.5)
CHLORIDE SERPL-SCNC: 104 MMOL/L (ref 95–110)
CO2 SERPL-SCNC: 27 MMOL/L (ref 23–29)
CREAT SERPL-MCNC: 1.2 MG/DL (ref 0.5–1.4)
DIFFERENTIAL METHOD BLD: ABNORMAL
EOSINOPHIL # BLD AUTO: 0.2 K/UL (ref 0–0.5)
EOSINOPHIL NFR BLD: 3.8 % (ref 0–8)
ERYTHROCYTE [DISTWIDTH] IN BLOOD BY AUTOMATED COUNT: 13.2 % (ref 11.5–14.5)
EST. GFR  (NO RACE VARIABLE): 47 ML/MIN/1.73 M^2
GLUCOSE SERPL-MCNC: 79 MG/DL (ref 70–110)
HCT VFR BLD AUTO: 34.7 % (ref 37–48.5)
HGB BLD-MCNC: 10.5 G/DL (ref 12–16)
IMM GRANULOCYTES # BLD AUTO: 0.02 K/UL (ref 0–0.04)
IMM GRANULOCYTES NFR BLD AUTO: 0.4 % (ref 0–0.5)
LYMPHOCYTES # BLD AUTO: 1.3 K/UL (ref 1–4.8)
LYMPHOCYTES NFR BLD: 27.6 % (ref 18–48)
MCH RBC QN AUTO: 29.2 PG (ref 27–31)
MCHC RBC AUTO-ENTMCNC: 30.3 G/DL (ref 32–36)
MCV RBC AUTO: 96 FL (ref 82–98)
MONOCYTES # BLD AUTO: 0.6 K/UL (ref 0.3–1)
MONOCYTES NFR BLD: 13.9 % (ref 4–15)
NEUTROPHILS # BLD AUTO: 2.4 K/UL (ref 1.8–7.7)
NEUTROPHILS NFR BLD: 53.4 % (ref 38–73)
NRBC BLD-RTO: 0 /100 WBC
PLATELET # BLD AUTO: 228 K/UL (ref 150–450)
PMV BLD AUTO: 9.7 FL (ref 9.2–12.9)
POTASSIUM SERPL-SCNC: 4.3 MMOL/L (ref 3.5–5.1)
PROT SERPL-MCNC: 8.1 G/DL (ref 6–8.4)
RBC # BLD AUTO: 3.6 M/UL (ref 4–5.4)
SODIUM SERPL-SCNC: 139 MMOL/L (ref 136–145)
WBC # BLD AUTO: 4.53 K/UL (ref 3.9–12.7)

## 2024-09-18 PROCEDURE — 36415 COLL VENOUS BLD VENIPUNCTURE: CPT | Mod: PO | Performed by: INTERNAL MEDICINE

## 2024-09-18 PROCEDURE — 80053 COMPREHEN METABOLIC PANEL: CPT | Performed by: INTERNAL MEDICINE

## 2024-09-18 PROCEDURE — 85025 COMPLETE CBC W/AUTO DIFF WBC: CPT | Performed by: INTERNAL MEDICINE

## 2025-02-06 ENCOUNTER — OFFICE VISIT (OUTPATIENT)
Dept: PODIATRY | Facility: CLINIC | Age: 75
End: 2025-02-06
Payer: MEDICARE

## 2025-02-06 VITALS — BODY MASS INDEX: 22 KG/M2 | HEIGHT: 65 IN | WEIGHT: 132.06 LBS

## 2025-02-06 DIAGNOSIS — B35.3 TINEA PEDIS OF LEFT FOOT: Primary | ICD-10-CM

## 2025-02-06 PROCEDURE — 99213 OFFICE O/P EST LOW 20 MIN: CPT | Mod: PBBFAC | Performed by: PODIATRIST

## 2025-02-06 PROCEDURE — 99203 OFFICE O/P NEW LOW 30 MIN: CPT | Mod: S$PBB,,, | Performed by: PODIATRIST

## 2025-02-06 PROCEDURE — 99999 PR PBB SHADOW E&M-EST. PATIENT-LVL III: CPT | Mod: PBBFAC,,, | Performed by: PODIATRIST

## 2025-02-06 RX ORDER — CLOTRIMAZOLE AND BETAMETHASONE DIPROPIONATE 10; .64 MG/G; MG/G
CREAM TOPICAL 2 TIMES DAILY
Qty: 45 G | Refills: 1 | Status: SHIPPED | OUTPATIENT
Start: 2025-02-06

## 2025-02-06 NOTE — PROGRESS NOTES
Subjective:     Patient ID: Julianna oMntemayor is a 74 y.o. female.    Chief Complaint: Nail Care (Non-diabetic pt c/o dryness on left foot, pt wears tennis shoes, PCP Juhi Wilburn last seen 8/22/2024)    Julianna is a 74 y.o. female who presents to the clinic complaining of peeling left foot.  Julianna is inquiring about treatment options.    Patient Active Problem List   Diagnosis    Autoimmune hepatitis    Anemia    Essential hypertension    Hyperlipidemia    Osteopenia    Allergic rhinitis    Vitamin D deficiency disease    Osteoarthritis of right knee    Anxiety associated with depression    Immunosuppressed status    Chronic kidney disease, stage 3a       Medication List with Changes/Refills   New Medications    CLOTRIMAZOLE-BETAMETHASONE 1-0.05% (LOTRISONE) CREAM    Apply topically 2 (two) times daily.   Current Medications    AZATHIOPRINE (IMURAN) 50 MG TAB    Take 1 tablet (50 mg total) by mouth once daily. Take 1/2 pill a day    CALCIUM CARB/VIT D3/MINERALS (CALCIUM-VITAMIN D ORAL)    Take 1 tablet by mouth once daily. Calcium 600 and vitamin d 800IU/day    CHOLECALCIFEROL, VITAMIN D3, 125 MCG (5,000 UNIT) TAB    Take 1,000 Units by mouth once daily.    LISINOPRIL-HYDROCHLOROTHIAZIDE (PRINZIDE,ZESTORETIC) 10-12.5 MG PER TABLET    TAKE 1 TABLET BY MOUTH EVERY DAY    PREDNISONE (DELTASONE) 2.5 MG TABLET    TAKE 1 TABLET BY MOUTH EVERY DAY       Review of patient's allergies indicates:   Allergen Reactions    No known drug allergies        Past Surgical History:   Procedure Laterality Date    APPENDECTOMY      COLONOSCOPY N/A 8/2/2016    Procedure: COLONOSCOPY;  Surgeon: Bran Reeves III, MD;  Location: [facility] ENDO;  Service: Endoscopy;  Laterality: N/A;    COLONOSCOPY N/A 5/12/2022    Procedure: COLONOSCOPY;  Surgeon: Bran Reeves III, MD;  Location: [facility] ENDO;  Service: Endoscopy;  Laterality: N/A;    INGUINAL HERNIA REPAIR Left 12/2019    JOINT REPLACEMENT Right 10/2013    knee    ORIF FINGER FRACTURE  Right     right middle finger    TOTAL ABDOMINAL HYSTERECTOMY         Family History   Problem Relation Name Age of Onset    Colon cancer Mother  89    Hypertension Mother      Dementia Mother      Stroke Mother  70    Cataracts Mother      Hypertension Sister Herminia     Cancer Sister Herminia     Anesthesia problems Sister Herminia         unknown prob. with anesthesia    Kidney failure Sister Kami         ESRD    Diabetes Sister Kami     Hypertension Sister Kami     No Known Problems Father      Heart attack Brother  48    Diabetes Daughter      Lupus Other Niece        Social History     Socioeconomic History    Marital status:    Tobacco Use    Smoking status: Never    Smokeless tobacco: Never   Substance and Sexual Activity    Alcohol use: Not Currently     Comment: once or month a year one glass of wine    Drug use: No    Sexual activity: Yes     Partners: Male   Social History Narrative    The patient is  to a smoker who smokes outside the home. 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 She has 2 adult children.      Social Drivers of Health     Financial Resource Strain: Medium Risk (6/18/2024)    Overall Financial Resource Strain (CARDIA)     Difficulty of Paying Living Expenses: Somewhat hard   Food Insecurity: No Food Insecurity (6/18/2024)    Hunger Vital Sign     Worried About Running Out of Food in the Last Year: Never true     Ran Out of Food in the Last Year: Never true   Transportation Needs: No Transportation Needs (6/18/2024)    PRAPARE - Transportation     Lack of Transportation (Medical): No     Lack of Transportation (Non-Medical): No   Physical Activity: Inactive (6/18/2024)    Exercise Vital Sign     Days of Exercise per Week: 0 days     Minutes of Exercise per Session: 0 min   Stress: No Stress Concern Present (6/18/2024)    [employer] - Occupational Stress Questionnaire     Feeling of Stress : Only a little  "  Housing Stability: Low Risk  (1/20/2023)    Housing Stability Vital Sign     Unable to Pay for Housing in the Last Year: No     Number of Places Lived in the Last Year: 1     Unstable Housing in the Last Year: No       Vitals:    02/06/25 0840   Weight: 59.9 kg (132 lb 0.9 oz)   Height: 5' 5" (1.651 m)   PainSc: 0-No pain     Review of Systems   Constitutional:  Negative for chills and fever.   Cardiovascular: Negative.    Gastrointestinal:  Negative for diarrhea, nausea and vomiting.   Skin:  Positive for rash.   Neurological: Negative.    Endo/Heme/Allergies: Negative.    Psychiatric/Behavioral: Negative.             Objective:      PHYSICAL EXAM: Apperance: Alert and orient in no distress,well developed, and with good attention to grooming and body habits  Lower Extremity Exam  VASCULAR: Dorsalis pedis pulses 2/4 bilateral and Posterior Tibial pulses 2/4 bilateral.   DERMATOLOGICAL: No skin rash, subcutaneous nodules, lesions or ulcers observed. Dry and scaly skin noted plantarly left in moccasin distribution. Webspaces 1,2,3,4 bilateral are clean, dry and without evidence of break in skin integrity.    NEUROLOGICAL: Light touch, sharp-dull, proprioception all present and equal bilaterally.    MUSCULOSKELETAL: Muscle strength is 5/5 for foot inverters, everters, plantarflexors, and dorsiflexors. Muscle tone is normal.           Assessment:       ICD-10-CM ICD-9-CM   1. Tinea pedis of left foot - Left Foot  B35.3 110.4       Plan:   Tinea pedis of left foot - Left Foot  -     clotrimazole-betamethasone 1-0.05% (LOTRISONE) cream; Apply topically 2 (two) times daily.  Dispense: 45 g; Refill: 1      I counseled the patient on her conditions, regarding findings of my examination, my impressions, and usual treatment plan.   Discuss treatment options for tinea pedis.  I explained that fungus lives in a warm dark moist environment and therefore patient should make every attempt to keep feet clean and dry.  We discussed " drying feet thoroughly after shower particularly between the toes.   Patient instructed to spray all shoes with Lysol disinfectant spray and let dry before wearing. Patient instructed to wash all socks in hot water and bleach.  Prescribed Lotrisone cream to be applied twice daily to areas of feet.   Patient to return in 4 months or sooner if needed.            Roby Greene DPM  [facility]

## 2025-02-08 NOTE — TELEPHONE ENCOUNTER
No care due was identified.  Health [facility] Embedded Care Due Messages. Reference number: [id number].   2/08/2025 9:53:21 AM CST

## 2025-02-09 RX ORDER — LISINOPRIL AND HYDROCHLOROTHIAZIDE 10; 12.5 MG/1; MG/1
1 TABLET ORAL
Qty: 90 TABLET | Refills: 2 | Status: SHIPPED | OUTPATIENT
Start: 2025-02-09

## 2025-02-09 NOTE — TELEPHONE ENCOUNTER
Refill Decision Note   Julianna Montemayor  is requesting a refill authorization.  Brief Assessment and Rationale for Refill:  Approve     Medication Therapy Plan:         Comments:     Note composed:2:44 PM 02/09/2025             Appointments     Last Visit   8/22/2024 Juhi Wilburn MD   Next Visit   2/27/2025 Juhi Wilburn MD

## 2025-02-10 ENCOUNTER — OFFICE VISIT (OUTPATIENT)
Dept: FAMILY MEDICINE | Facility: CLINIC | Age: 75
End: 2025-02-10
Payer: MEDICARE

## 2025-02-10 VITALS
OXYGEN SATURATION: 96 % | HEART RATE: 94 BPM | HEIGHT: 65 IN | SYSTOLIC BLOOD PRESSURE: 128 MMHG | BODY MASS INDEX: 23.12 KG/M2 | WEIGHT: 138.75 LBS | TEMPERATURE: 97 F | DIASTOLIC BLOOD PRESSURE: 82 MMHG

## 2025-02-10 DIAGNOSIS — L03.119 CELLULITIS OF FOOT: Primary | ICD-10-CM

## 2025-02-10 DIAGNOSIS — N81.10 VAGINAL PROLAPSE: ICD-10-CM

## 2025-02-10 DIAGNOSIS — I10 ESSENTIAL HYPERTENSION: Chronic | ICD-10-CM

## 2025-02-10 DIAGNOSIS — E78.5 HYPERLIPIDEMIA, UNSPECIFIED HYPERLIPIDEMIA TYPE: Chronic | ICD-10-CM

## 2025-02-10 DIAGNOSIS — K75.4 AUTOIMMUNE HEPATITIS: Chronic | ICD-10-CM

## 2025-02-10 DIAGNOSIS — J06.9 ACUTE URI: ICD-10-CM

## 2025-02-10 PROCEDURE — G2211 COMPLEX E/M VISIT ADD ON: HCPCS | Mod: S$PBB,,, | Performed by: FAMILY MEDICINE

## 2025-02-10 PROCEDURE — 99999 PR PBB SHADOW E&M-EST. PATIENT-LVL III: CPT | Mod: PBBFAC,,, | Performed by: FAMILY MEDICINE

## 2025-02-10 PROCEDURE — 99214 OFFICE O/P EST MOD 30 MIN: CPT | Mod: S$PBB,,, | Performed by: FAMILY MEDICINE

## 2025-02-10 PROCEDURE — 99213 OFFICE O/P EST LOW 20 MIN: CPT | Mod: PBBFAC,PO | Performed by: FAMILY MEDICINE

## 2025-02-10 RX ORDER — MUPIROCIN 20 MG/G
OINTMENT TOPICAL 3 TIMES DAILY
Qty: 1 EACH | Refills: 0 | Status: SHIPPED | OUTPATIENT
Start: 2025-02-10

## 2025-02-10 RX ORDER — SULFAMETHOXAZOLE AND TRIMETHOPRIM 800; 160 MG/1; MG/1
1 TABLET ORAL 2 TIMES DAILY
Qty: 20 TABLET | Refills: 0 | Status: SHIPPED | OUTPATIENT
Start: 2025-02-10

## 2025-02-10 RX ORDER — METHYLPREDNISOLONE 4 MG/1
TABLET ORAL
Qty: 1 EACH | Refills: 0 | Status: SHIPPED | OUTPATIENT
Start: 2025-02-10

## 2025-02-10 NOTE — PROGRESS NOTES
CHIEF COMPLAINT:  This is a 74-year-old female complaining of respiratory illness and foot pain/swelling.     SUBJECTIVE:  The patient complains of onset of redness and swelling distal left foot starting at great toe.  She was seen by podiatrist and toenail was trimmed.  She was told she had tinea pedis and was started on Lotrisone cream.  After applying cream, she had onset of itching and swelling of distal foot with development of redness extending from big toe till dorsal foot.  Patient denies fever or chills.  She discontinued use of Lotrisone.  She has applied alcohol and Neosporin without relief.    Patient complains of a 2-3 day history of nasal congestion, postnasal drip and slight cough.  She has been taking Robitussin DM with some relief.  She denies fever, chills, chest congestion, shortness for breath or wheezing.  Patient denies ill contacts.  She received influenza vaccine.  Patient is a nonsmoker.    The patient's blood pressure is controlled on lisinopril HCT 10-12.5 mg daily. Her BP today is 128/82.  She has chronic kidney disease stage 3b. She is taking Imuran and oral steroids for autoimmune hepatitis. She has chronic anemia. The patient has osteopenia but refuses bisphosphonates.      The patient continues to have problems with vaginal prolapse which she feels is getting worse.  She requests referral to OBGYN.     ROS:  GENERAL: Patient denies fever, chills, night sweats. Patient denies weight loss. Patient denies anorexia, fatigue, weakness or swollen glands.  SKIN: Patient denies positive for rash.  HEENT: Patient denies sore throat, ear pain, hearing loss, or runny nose. Patient denies visual disturbance, eye irritation or discharge.  Positive for nasal congestion and postnasal drip.  LUNGS: Patient denies wheeze or hemoptysis.  Positive for cough.  CARDIOVASCULAR: Patient denies chest pain, shortness of breath, palpitations, syncope or lower extremity edema.  GI: Patient denies abdominal  pain, nausea, vomiting, diarrhea, constipation, blood in stool or melena.  GENITOURINARY: Patient denies pelvic pain, vaginal discharge, itch or odor. Patient denies irregular vaginal bleeding. Patient denies dysuria, frequency, hematuria, nocturia, urgency or incontinence.  Positive for vaginal prolapse.  MUSCULOSKELETAL: Patient denies joint swelling, redness or warmth. Positive for joint pain.  NEUROLOGIC: Patient denies headache, vertigo, paresthesias, weakness in limb, dysarthria, dysphagia or abnormality of gait.  PSYCHIATRIC: Patient denies anxiety, depression, or memory loss.     OBJECTIVE:   GENERAL: Well-developed well-nourished black female alert and oriented x3, in no acute distress. Memory, judgment and cognition without deficit.  SKIN:  Confluent erythema of left big toe and extending onto dorsal surface of foot.  Ulceration of skin lateral surface of left big toe.  HEENT: Eyes: Clear conjunctivae. Pupils equal reactive to light and accommodation. Ears: Clear TMs. Clear canals. Nose: Moderate nasal congestion, left greater than right. Pharynx: Without injection or exudates.  NECK: Supple, normal range of motion. No masses, nodes or enlarged thyroid. No JVD. Carotids 2+ and equal. No bruits.  LUNGS: Clear to auscultation. Normal respiratory effort.  CARDIOVASCULAR: Regular rhythm, normal S1, S2 without murmur, gallop or rub.  EXTREMITIES: Without cyanosis, clubbing or edema. Distal pulses 2+ and equal. Normal range of motion in all extremities. No joint effusion, erythema or warmth.  NEUROLOGIC:  Gait without abnormality.  No tremor.    ASSESSMENT:  1. Cellulitis of foot    2. Acute URI    3. Vaginal prolapse    4. Essential hypertension    5. Autoimmune hepatitis    6. Hyperlipidemia, unspecified hyperlipidemia type      PLAN:  1. Bactrim DS 1 tablet twice daily for 10 days.    2. Medrol Dosepak.  Hold prednisone 2.5 mg while taking medication, then resume.    3. Mupirocin 2% ointment apply 3 times  daily to skin ulceration.    4. OBGYN consult.    5. Follow-up if no improvement or worsening symptoms.    30 minutes of total time spent on the encounter, which includes face to face time and non-face to face time preparing to see the patient (eg, review of tests), Obtaining and/or reviewing separately obtained history, Documenting clinical information in the electronic or other health record, Independently interpreting results (not separately reported) and communicating results to the patient/family/caregiver, or Care coordination (not separately reported).     This note is generated with speech recognition software and is subject to transcription error and sound alike phrases that may be missed by proofreading.

## 2025-02-18 DIAGNOSIS — K75.4 AUTOIMMUNE HEPATITIS: ICD-10-CM

## 2025-02-18 RX ORDER — AZATHIOPRINE 50 MG/1
50 TABLET ORAL DAILY
Start: 2025-02-18

## 2025-02-27 ENCOUNTER — OFFICE VISIT (OUTPATIENT)
Dept: FAMILY MEDICINE | Facility: CLINIC | Age: 75
End: 2025-02-27
Payer: MEDICARE

## 2025-02-27 VITALS
RESPIRATION RATE: 17 BRPM | HEIGHT: 65 IN | WEIGHT: 135.56 LBS | SYSTOLIC BLOOD PRESSURE: 112 MMHG | BODY MASS INDEX: 22.59 KG/M2 | TEMPERATURE: 97 F | HEART RATE: 77 BPM | DIASTOLIC BLOOD PRESSURE: 70 MMHG | OXYGEN SATURATION: 99 %

## 2025-02-27 DIAGNOSIS — R20.2 PARESTHESIA OF BOTH HANDS: Primary | ICD-10-CM

## 2025-02-27 DIAGNOSIS — L03.119 CELLULITIS OF FOOT: ICD-10-CM

## 2025-02-27 DIAGNOSIS — J31.0 CHRONIC RHINITIS: ICD-10-CM

## 2025-02-27 PROCEDURE — 99999 PR PBB SHADOW E&M-EST. PATIENT-LVL III: CPT | Mod: PBBFAC,,, | Performed by: FAMILY MEDICINE

## 2025-02-27 PROCEDURE — G2211 COMPLEX E/M VISIT ADD ON: HCPCS | Mod: S$PBB,,, | Performed by: FAMILY MEDICINE

## 2025-02-27 PROCEDURE — 99213 OFFICE O/P EST LOW 20 MIN: CPT | Mod: S$PBB,,, | Performed by: FAMILY MEDICINE

## 2025-02-27 PROCEDURE — 99213 OFFICE O/P EST LOW 20 MIN: CPT | Mod: PBBFAC,PO | Performed by: FAMILY MEDICINE

## 2025-02-27 RX ORDER — FLUTICASONE PROPIONATE 50 MCG
2 SPRAY, SUSPENSION (ML) NASAL DAILY
Qty: 16 G | Refills: 5 | Status: SHIPPED | OUTPATIENT
Start: 2025-02-27

## 2025-02-27 NOTE — PROGRESS NOTES
CHIEF COMPLAINT:  This is a 74-year-old female with multiple medical complaints.      SUBJECTIVE:  The patient complains of tingling in the fingers of both hands for weeks to months.  Symptoms are intermittent in nature and not necessarily worse at night or in the morning.  Finger sometimes turn white in color.  Patient complains of small nodules in palms of hands.  She has been eating red beats with improvement.  Patient complains of persistent sinus symptoms in particular stuffy nose and stopped up ears.  Patient requests advice regarding continued use of mupirocin ointment on open wound and cellulitis left great toe.    The patient's blood pressure is controlled on lisinopril HCT 10-12.5 mg daily. Her BP today is 112/70.  She has chronic kidney disease stage 3b. She is taking Imuran and oral steroids for autoimmune hepatitis. She has chronic anemia. The patient has osteopenia but refuses bisphosphonates.       ROS:  GENERAL: Patient denies fever, chills, night sweats. Patient denies weight loss. Patient denies anorexia, fatigue, weakness or swollen glands.  SKIN: Patient denies positive for rash.  HEENT: Patient denies sore throat, ear pain, hearing loss, or runny nose. Patient denies visual disturbance, eye irritation or discharge.  Positive for nasal congestion.  LUNGS: Patient denies cough, wheeze or hemoptysis.    CARDIOVASCULAR: Patient denies chest pain, shortness of breath, palpitations, syncope or lower extremity edema.  GI: Patient denies abdominal pain, nausea, vomiting, diarrhea, constipation, blood in stool or melena.  GENITOURINARY: Patient denies pelvic pain, vaginal discharge, itch or odor. Patient denies irregular vaginal bleeding. Patient denies dysuria, frequency, hematuria, nocturia, urgency or incontinence.  Positive for vaginal prolapse.  MUSCULOSKELETAL: Patient denies joint swelling, redness or warmth. Positive for joint pain.  NEUROLOGIC: Patient denies headache, vertigo, paresthesias,  weakness in limb, dysarthria, dysphagia or abnormality of gait.  PSYCHIATRIC: Patient denies anxiety, depression, or memory loss.     OBJECTIVE:   GENERAL: Well-developed well-nourished black female alert and oriented x3, in no acute distress. Memory, judgment and cognition without deficit.  SKIN:  Mild erythema left big toe.  Open wound/ulcer has healed.  HEENT: Eyes: Clear conjunctivae.  No scleral icterus. Ears: Clear TMs. Clear canals. Nose:  Mild bilateral nasal congestion. Pharynx: Without injection or exudates.  NECK: Supple, normal range of motion. No masses, nodes or enlarged thyroid. No JVD. Carotids 2+ and equal. No bruits.  LUNGS: Clear to auscultation. Normal respiratory effort.  CARDIOVASCULAR: Regular rhythm, normal S1, S2 without murmur, gallop or rub.  EXTREMITIES: Without cyanosis, clubbing or edema. Distal pulses 2+ and equal. Normal range of motion in all extremities. No joint effusion, erythema or warmth.  Tiny nodule palmar surface of right hand.  NEUROLOGIC:  Gait without abnormality.  No tremor.    ASSESSMENT:  1. Paresthesia of both hands    2. Chronic rhinitis    3. Cellulitis of foot      PLAN:  1.  Discussed use of wrist splints for probable CTS.  2.  Flonase nasal spray 2 sprays each nostril once daily.    3.  Nonsedating antihistamine such as Claritin or Zyrtec.    4.  May continue to use mupirocin as needed.    5.  Follow-up if no improvement or worsening symptoms.    20 minutes of total time spent on the encounter, which includes face to face time and non-face to face time preparing to see the patient (eg, review of tests), Obtaining and/or reviewing separately obtained history, Documenting clinical information in the electronic or other health record, Independently interpreting results (not separately reported) and communicating results to the patient/family/caregiver, or Care coordination (not separately reported).     This note is generated with speech recognition software and is  subject to transcription error and sound alike phrases that may be missed by proofreading.

## 2025-03-19 ENCOUNTER — OFFICE VISIT (OUTPATIENT)
Dept: OBSTETRICS AND GYNECOLOGY | Facility: CLINIC | Age: 75
End: 2025-03-19
Payer: MEDICARE

## 2025-03-19 ENCOUNTER — TELEPHONE (OUTPATIENT)
Dept: OBSTETRICS AND GYNECOLOGY | Facility: CLINIC | Age: 75
End: 2025-03-19
Payer: MEDICARE

## 2025-03-19 VITALS
DIASTOLIC BLOOD PRESSURE: 82 MMHG | BODY MASS INDEX: 23.07 KG/M2 | WEIGHT: 138.44 LBS | SYSTOLIC BLOOD PRESSURE: 110 MMHG | HEIGHT: 65 IN

## 2025-03-19 DIAGNOSIS — N81.10 VAGINAL PROLAPSE: ICD-10-CM

## 2025-03-19 DIAGNOSIS — N81.10 BADEN-WALKER GRADE 3 CYSTOCELE: Primary | ICD-10-CM

## 2025-03-19 DIAGNOSIS — N95.2 VAGINAL ATROPHY: ICD-10-CM

## 2025-03-19 PROCEDURE — 99213 OFFICE O/P EST LOW 20 MIN: CPT | Mod: PBBFAC | Performed by: OBSTETRICS & GYNECOLOGY

## 2025-03-19 PROCEDURE — 99203 OFFICE O/P NEW LOW 30 MIN: CPT | Mod: S$PBB,,, | Performed by: OBSTETRICS & GYNECOLOGY

## 2025-03-19 PROCEDURE — 99999 PR PBB SHADOW E&M-EST. PATIENT-LVL III: CPT | Mod: PBBFAC,,, | Performed by: OBSTETRICS & GYNECOLOGY

## 2025-03-19 RX ORDER — ESTRADIOL 0.1 MG/G
1 CREAM VAGINAL EVERY OTHER DAY
Qty: 42.5 G | Refills: 2 | Status: SHIPPED | OUTPATIENT
Start: 2025-03-19 | End: 2026-03-19

## 2025-03-19 NOTE — PROGRESS NOTES
"  Subjective:       Patient ID: Julianna Montemayor is a 74 y.o. female.    Chief Complaint:  Vaginal Prolapse      History of Present Illness  HPI  Referral from Dr Wilburn for vaginal bulging   No defecation or micturation issues   Hysterectomy bilateral salpingo-oophorectomy for benign condition in the past   Dominant symptom is the vaginal bulge and pressure   No pelvic pain   Past history reviewed and updated     Health Maintenance   Topic Date Due    Shingles Vaccine (1 of 2) Never done    COVID-19 Vaccine (3 - Moderna risk series) 2022    TETANUS VACCINE  2022    RSV Vaccine (Age 60+ and Pregnant patients) (1 - 1-dose 75+ series) Never done    DEXA Scan  2026    Lipid Panel  2029    Hepatitis C Screening  Completed    Influenza Vaccine  Completed    Pneumococcal Vaccines (Age 50+)  Completed     GYN & OB History  No LMP recorded. Patient has had a hysterectomy.   Date of Last Pap: No result found    OB History    Para Term  AB Living   3 2   1 2   SAB IAB Ectopic Multiple Live Births   1          # Outcome Date GA Lbr Vaibhav/2nd Weight Sex Type Anes PTL Lv   3 SAB            2 Para            1 Para                Review of Systems  Review of Systems        Objective:   /82   Ht 5' 5" (1.651 m)   Wt 62.8 kg (138 lb 7.2 oz)   BMI 23.04 kg/m²    Physical Exam:             Abdominal: Soft. Bowel sounds are normal. She exhibits no distension, no mass and no abdominal incision. There is no abdominal tenderness. There is no guarding. No hernia.     Genitourinary:    Inguinal canal and rectum normal.   The external female genitalia was normal.   There is no rash, tenderness or lesion on the right labia. There is no rash, tenderness or lesion on the left labia. Right adnexum displays no mass, no tenderness and no fullness. Left adnexum displays no mass, no tenderness and no fullness. There is cystocele (grade 3) in the vagina. No vaginal discharge, tenderness, bleeding, " rectocele or prolapse of vaginal walls in the vagina.    No foreign body in the vagina.      No signs of injury in the vagina.   Vaginal atrophy noted. Cervix is absent.Uterus is absent. Normal urethral meatus.Urethra findings: no tendernessBladder findings: no bladder tenderness                     Assessment:        1. Tumacacori-Walker grade 3 cystocele    2. Vaginal atrophy    3. Vaginal prolapse                Plan:       Reviewed options with the patient to include observation,, pessary placement , and surgical repair      Julianna was seen today for vaginal prolapse.    Diagnoses and all orders for this visit:    Tumacacori-Walker grade 3 cystocele  -     Ambulatory referral/consult to Obstetrics / Gynecology  -     estradioL (ESTRACE) 0.01 % (0.1 mg/gram) vaginal cream; Place 1 g vaginally every other day.    Vaginal atrophy    Vaginal prolapse    I have explained the risks, benefits , and alternatives of anterior colporrhaphy  in detail.  The patient voices understanding and all questions have been answered.  The patient agrees to proceed as planned.  Consent forms for the procedure have been reviewed and signed by the patient.

## 2025-03-20 ENCOUNTER — TELEPHONE (OUTPATIENT)
Dept: OBSTETRICS AND GYNECOLOGY | Facility: CLINIC | Age: 75
End: 2025-03-20
Payer: MEDICARE

## 2025-03-20 NOTE — TELEPHONE ENCOUNTER
----- Message from Meaghan sent at 3/20/2025  8:29 AM CDT -----  Type:  Patient Returning CallWho Called:Shalonda Left Message for Patient:Does the patient know what this is regarding?:ProcedureWould the patient rather a call back or a response via [facility]? [facility] Call Back Number:[phone] Information: Need a callback concerning Surgery time

## 2025-03-20 NOTE — TELEPHONE ENCOUNTER
Called patient and advised surgery 5/9, EKG and Labs 5/2.  Patient was confused and making sure it was May and not April.

## 2025-04-17 ENCOUNTER — TELEPHONE (OUTPATIENT)
Dept: OBSTETRICS AND GYNECOLOGY | Facility: CLINIC | Age: 75
End: 2025-04-17
Payer: MEDICARE

## 2025-04-17 NOTE — TELEPHONE ENCOUNTER
Called patient and she had a couple of questions about upcoming surgery:    Could Dr. Morgan just go ahead and remove uterus?  If surgery type changes would her hospital stay still be just over night?    Advised patient message would be sent to ED and it may be Monday before response.  Patient verbalized understanding.

## 2025-04-17 NOTE — TELEPHONE ENCOUNTER
----- Message from Nannette sent at 4/17/2025  8:44 AM CDT -----  Contact: CONY VERDUGO [[id number]]  .Type:  Patient Requesting CallWho Called:CONY VERDUGO [[id number]]Does the patient know what this is regarding?:Patient requesting a call back in regards to upcoming procedure.Would the patient rather a call back or a response via [facility]? Call [facility] Call Back Number:.[phone] (home)  Additional Information:

## 2025-04-17 NOTE — TELEPHONE ENCOUNTER
HIRA Morgan MD to Me (Selected Message)      4/17/25  1:45 PM  Uterus is already gone   Anterior repair all that needed    Left message for patient to return call to [phone].

## 2025-04-29 ENCOUNTER — LAB VISIT (OUTPATIENT)
Dept: LAB | Facility: HOSPITAL | Age: 75
End: 2025-04-29
Attending: OBSTETRICS & GYNECOLOGY
Payer: MEDICARE

## 2025-04-29 DIAGNOSIS — N81.10 BADEN-WALKER GRADE 3 CYSTOCELE: ICD-10-CM

## 2025-04-29 LAB
ABSOLUTE EOSINOPHIL (OHS): 0.14 K/UL
ABSOLUTE MONOCYTE (OHS): 0.58 K/UL (ref 0.3–1)
ABSOLUTE NEUTROPHIL COUNT (OHS): 2.74 K/UL (ref 1.8–7.7)
ANION GAP (OHS): 7 MMOL/L (ref 8–16)
BASOPHILS # BLD AUTO: 0.04 K/UL
BASOPHILS NFR BLD AUTO: 0.8 %
BUN SERPL-MCNC: 25 MG/DL (ref 8–23)
CALCIUM SERPL-MCNC: 9.2 MG/DL (ref 8.7–10.5)
CHLORIDE SERPL-SCNC: 105 MMOL/L (ref 95–110)
CO2 SERPL-SCNC: 28 MMOL/L (ref 23–29)
CREAT SERPL-MCNC: 1.1 MG/DL (ref 0.5–1.4)
ERYTHROCYTE [DISTWIDTH] IN BLOOD BY AUTOMATED COUNT: 13.2 % (ref 11.5–14.5)
GFR SERPLBLD CREATININE-BSD FMLA CKD-EPI: 52 ML/MIN/1.73/M2
GLUCOSE SERPL-MCNC: 72 MG/DL (ref 70–110)
HCT VFR BLD AUTO: 33.8 % (ref 37–48.5)
HGB BLD-MCNC: 10.2 GM/DL (ref 12–16)
IMM GRANULOCYTES # BLD AUTO: 0.01 K/UL (ref 0–0.04)
IMM GRANULOCYTES NFR BLD AUTO: 0.2 % (ref 0–0.5)
LYMPHOCYTES # BLD AUTO: 1.78 K/UL (ref 1–4.8)
MCH RBC QN AUTO: 28.7 PG (ref 27–31)
MCHC RBC AUTO-ENTMCNC: 30.2 G/DL (ref 32–36)
MCV RBC AUTO: 95 FL (ref 82–98)
NUCLEATED RBC (/100WBC) (OHS): 0 /100 WBC
PLATELET # BLD AUTO: 202 K/UL (ref 150–450)
PMV BLD AUTO: 10.3 FL (ref 9.2–12.9)
POTASSIUM SERPL-SCNC: 4 MMOL/L (ref 3.5–5.1)
RBC # BLD AUTO: 3.55 M/UL (ref 4–5.4)
RELATIVE EOSINOPHIL (OHS): 2.6 %
RELATIVE LYMPHOCYTE (OHS): 33.6 % (ref 18–48)
RELATIVE MONOCYTE (OHS): 11 % (ref 4–15)
RELATIVE NEUTROPHIL (OHS): 51.8 % (ref 38–73)
SODIUM SERPL-SCNC: 140 MMOL/L (ref 136–145)
WBC # BLD AUTO: 5.29 K/UL (ref 3.9–12.7)

## 2025-04-29 PROCEDURE — 85025 COMPLETE CBC W/AUTO DIFF WBC: CPT

## 2025-04-29 PROCEDURE — 36415 COLL VENOUS BLD VENIPUNCTURE: CPT | Mod: PO

## 2025-04-29 PROCEDURE — 80048 BASIC METABOLIC PNL TOTAL CA: CPT

## 2025-05-01 ENCOUNTER — OFFICE VISIT (OUTPATIENT)
Dept: GASTROENTEROLOGY | Facility: CLINIC | Age: 75
End: 2025-05-01
Payer: MEDICARE

## 2025-05-01 ENCOUNTER — HOSPITAL ENCOUNTER (OUTPATIENT)
Dept: CARDIOLOGY | Facility: HOSPITAL | Age: 75
Discharge: HOME | End: 2025-05-01
Attending: OBSTETRICS & GYNECOLOGY
Payer: MEDICARE

## 2025-05-01 VITALS
DIASTOLIC BLOOD PRESSURE: 74 MMHG | HEART RATE: 58 BPM | BODY MASS INDEX: 22.68 KG/M2 | SYSTOLIC BLOOD PRESSURE: 114 MMHG | WEIGHT: 136.13 LBS | HEIGHT: 65 IN

## 2025-05-01 DIAGNOSIS — K75.4 AUTOIMMUNE HEPATITIS: Primary | Chronic | ICD-10-CM

## 2025-05-01 DIAGNOSIS — N81.10 BADEN-WALKER GRADE 3 CYSTOCELE: ICD-10-CM

## 2025-05-01 LAB
OHS QRS DURATION: 84 MS
OHS QTC CALCULATION: 445 MS

## 2025-05-01 PROCEDURE — 93010 ELECTROCARDIOGRAM REPORT: CPT | Mod: ,,, | Performed by: INTERNAL MEDICINE

## 2025-05-01 PROCEDURE — 99999 PR PBB SHADOW E&M-EST. PATIENT-LVL IV: CPT | Mod: PBBFAC,,, | Performed by: INTERNAL MEDICINE

## 2025-05-01 PROCEDURE — 93005 ELECTROCARDIOGRAM TRACING: CPT

## 2025-05-01 PROCEDURE — 99214 OFFICE O/P EST MOD 30 MIN: CPT | Mod: S$PBB,,, | Performed by: INTERNAL MEDICINE

## 2025-05-01 PROCEDURE — 99214 OFFICE O/P EST MOD 30 MIN: CPT | Mod: PBBFAC,25 | Performed by: INTERNAL MEDICINE

## 2025-05-01 NOTE — PATIENT INSTRUCTIONS
05/01/2025    Dear Julianna Montemayor,    We are writing to express our heartfelt gratitude for choosing us as your healthcare provider and entrusting us with your well-being. Your health and satisfaction are our top priorities, and we are truly honored to have the opportunity to serve you.    At [facility], we strive to provide the best possible care and support for our patients. My assessment and recommendations are as follows:    Autoimmune hepatitis  -     Hepatic Function Panel; Future; Expected date: 05/01/2025  -     Protime-INR; Future; Expected date: 05/01/2025  -     CT Abdomen Pelvis With IV Contrast Routine Oral Contrast; Future; Expected date: 05/01/2025  -     FibroScan (Vibration Controlled Transient Elastography); Future      To support optimal gut health and maintain regular bowel movements, we recommend incorporating the following into your daily routine:    Psyllium Husk - A soluble fiber that aids digestion, promotes stool regularity, and supports overall gut health.  Magnesium Glycinate - Helps with muscle relaxation, including the intestinal muscles, to ease bowel movements and reduce cramping.  Ground Flaxseed - A rich source of fiber and omega-3 fatty acids that supports digestion and reduces inflammation.  Probiotics (Florastor or Align) - Supports a healthy gut microbiome by replenishing beneficial bacteria and promoting digestive balance.    Patients may adjust the frequency and dosage of these supplements based on their individual tolerance and response. If you experience loose stools or discomfort, you may reduce the dosage or take them less frequently. If you need additional digestive support, gradual increases may be beneficial.    It is essential to drink plenty of water throughout the day to ensure these supplements work effectively and to prevent constipation.     Additionally, lifestyle modifications can significantly improve symptoms of acid reflux. These include:  Maintaining a  "healthy weight  Elevating the head of the bed to reduce nighttime reflux  Avoiding trigger foods such as spicy, acidic, or fatty foods  Refraining from eating late meals and staying upright for at least 1.5 hours after eating    We genuinely value your feedback and believe that it plays a crucial role in our pursuit of excellence. We kindly request you to take a few minutes of your time to share your experience with us by posting a [employer] review. Your honest thoughts and opinions can make a significant difference for others seeking healthcare services and will help us better understand how we can further enhance our patient care.    Your kind words and positive reviews will not only motivate our dedicated team but will also inspire confidence in potential patients who are looking for exceptional healthcare services.    We genuinely value your feedback and believe that it plays a crucial role in our pursuit of excellence. We kindly request you to take a few minutes of your time to share your experience with us by posting a Google review. Your honest thoughts and opinions can make a significant difference for others seeking healthcare services and will help us better understand how we can further enhance our patient care.    Thank you for trusting us with your care,        Avila Branham M.D.  click on the link for contact information.       PS: At [facility] we offer virtual visits. Please use your [facility] anita to schedule a virtual visit.     To rate your experience with Dr. Branham, click on this link    To post a review, simply follow these quick steps:  1. Visit [employer] or search for my name on Google.  2. Click on the "Write a review" button on the left-hand side of the page.  3. Rate your experience by choosing the number of stars that reflect your satisfaction.  4. Share your thoughts and insights about your visit - we'd love to hear your feedback!    "

## 2025-05-01 NOTE — PROGRESS NOTES
[facility]  Gastroenterology    Patient evaluated at the request of No referring provider defined for this encounter.     PCP: Juhi Wilburn MD    Chief Complaint    autoimmune hepatits ; Follow-up           History of present illness/subjective    Mrs. Montemayor is a known patient to our department with a history of Autoimmune Hepatitis (AIH) and has been doing well on low dose prednisone and Imuran. No signs/symptoms of chronic liver disease and no jaundice, ascites, edema, weight loss or GI bleeding. She comes today for her yearly follow up.     Past Medical History:   Diagnosis Date    Allergic rhinitis     Anemia     Anxiety associated with depression     Autoimmune hepatitis     DJD (degenerative joint disease), lumbar     Essential hypertension     History of blood transfusion     Hyperlipidemia     Osteoarthritis of both hips     Osteoarthritis of right knee     Osteopenia     Sacroiliac joint disease     Tubular adenoma of colon     Vitamin D deficiency disease        Past Surgical History:   Procedure Laterality Date    APPENDECTOMY      COLONOSCOPY N/A 8/2/2016    Procedure: COLONOSCOPY;  Surgeon: Bran Reeves III, MD;  Location: [facility];  Service: Endoscopy;  Laterality: N/A;    COLONOSCOPY N/A 5/12/2022    Procedure: COLONOSCOPY;  Surgeon: rBan Reeves III, MD;  Location: [facility];  Service: Endoscopy;  Laterality: N/A;    INGUINAL HERNIA REPAIR Left 12/2019    JOINT REPLACEMENT Right 10/2013    knee    ORIF FINGER FRACTURE Right     right middle finger    TOTAL ABDOMINAL HYSTERECTOMY         Medications Ordered Prior to Encounter[1]    Review of patient's allergies indicates:   Allergen Reactions    No known drug allergies        Social History[2]    Family History   Problem Relation Name Age of Onset    Colon cancer Mother  89    Hypertension Mother      Dementia Mother      Stroke Mother  70    Cataracts Mother      Hypertension Sister Herminia     Cancer Sister Herminia      "Anesthesia problems Sister Herminia         unknown prob. with anesthesia    Kidney failure Sister Kami         ESRD    Diabetes Sister Kami     Hypertension Sister Kami     No Known Problems Father      Heart attack Brother  48    Diabetes Daughter      Lupus Other Niece        Vitals:    05/01/25 0837   BP: 114/74   Patient Position: Sitting   Pulse: (!) 58   Weight: 61.7 kg (136 lb 2.1 oz)   Height: 5' 5" (1.651 m)     Body mass index is 22.65 kg/m².    Physical Exam  Constitutional:       Appearance: She is well-developed.   HENT:      Head: Normocephalic and atraumatic.   Eyes:      Pupils: Pupils are equal, round, and reactive to light.   Neck:      Thyroid: No thyromegaly.   Cardiovascular:      Rate and Rhythm: Normal rate and regular rhythm.      Heart sounds: Normal heart sounds. No murmur heard.  Pulmonary:      Effort: Pulmonary effort is normal. No respiratory distress.      Breath sounds: Normal breath sounds. No wheezing or rales.   Abdominal:      General: Bowel sounds are normal. There is no distension.      Palpations: Abdomen is soft. There is no mass.      Tenderness: There is no abdominal tenderness.   Musculoskeletal:         General: No tenderness. Normal range of motion.      Cervical back: Normal range of motion and neck supple.   Lymphadenopathy:      Cervical: No cervical adenopathy.   Skin:     General: Skin is warm and dry.      Findings: No erythema.   Neurological:      Mental Status: She is alert and oriented to person, place, and time.      Cranial Nerves: No cranial nerve deficit.      Deep Tendon Reflexes: Reflexes are normal and symmetric.   Psychiatric:         Behavior: Behavior normal.         Lab Results   Component Value Date    WBC 5.29 04/29/2025    HGB 10.2 (L) 04/29/2025    HCT 33.8 (L) 04/29/2025    MCV 95 04/29/2025     04/29/2025         BMP  Lab Results   Component Value Date     04/29/2025    K 4.0 04/29/2025     04/29/2025    CO2 28 04/29/2025 " "   BUN 25 (H) 04/29/2025    CREATININE 1.1 04/29/2025    CALCIUM 9.2 04/29/2025    ANIONGAP 7 (L) 04/29/2025    EGFRNORACEVR 52 (L) 04/29/2025       Lab Results   Component Value Date    ALT 13 09/18/2024    AST 26 09/18/2024     (H) 10/13/2008    ALKPHOS 53 (L) 09/18/2024    BILITOT 0.7 09/18/2024       No results found for: "LIPASE"    ASSESSMENT AND RECOMMENDATIONS     1. Autoimmune hepatitis  -     Hepatic Function Panel; Future; Expected date: 05/01/2025  -     Protime-INR; Future; Expected date: 05/01/2025  -     CT Abdomen Pelvis With IV Contrast Routine Oral Contrast; Future; Expected date: 05/01/2025  -     FibroScan (Vibration Controlled Transient Elastography); Future       To support optimal gut health and maintain regular bowel movements, we recommend incorporating the following into your daily routine:    Psyllium Husk - A soluble fiber that aids digestion, promotes stool regularity, and supports overall gut health.  Magnesium Glycinate - Helps with muscle relaxation, including the intestinal muscles, to ease bowel movements and reduce cramping.  Ground Flaxseed - A rich source of fiber and omega-3 fatty acids that supports digestion and reduces inflammation.  Probiotics (Florastor or Align) - Supports a healthy gut microbiome by replenishing beneficial bacteria and promoting digestive balance.    Patients may adjust the frequency and dosage of these supplements based on their individual tolerance and response. If you experience loose stools or discomfort, you may reduce the dosage or take them less frequently. If you need additional digestive support, gradual increases may be beneficial.    It is essential to drink plenty of water throughout the day to ensure these supplements work effectively and to prevent constipation.     Additionally, lifestyle modifications can significantly improve symptoms of acid reflux. These include:  Maintaining a healthy weight  Elevating the head of the bed to " reduce nighttime reflux  Avoiding trigger foods such as spicy, acidic, or fatty foods  Refraining from eating late meals and staying upright for at least 1.5 hours after eating    Please follow the recommended guidelines, listen to your body, and consult your healthcare provider if you experience any concerns or changes in your digestive health.  Risks, benefits and alternative options were discussed with the patient. Risks including but not limited to infection, bleeding, heart or respiratory problems and perforation that may require surgery were all explained to the patient. The patient had a chance for questions if there were doubts or concerns about the test. The referring provider will be notified that our consultation is complete and available through the patient's records.    Thanks for letting us participate in the care of this nice patient,          Avila JERI Branham               [1]   Current Outpatient Medications on File Prior to Visit   Medication Sig Dispense Refill    azaTHIOprine (IMURAN) 50 mg Tab Take 1 tablet (50 mg total) by mouth once daily. Take 1/2 pill a day      calcium carb/vit D3/minerals (CALCIUM-VITAMIN D ORAL) Take 1 tablet by mouth once daily. Calcium 600 and vitamin d 800IU/day      cholecalciferol, vitamin D3, 125 mcg (5,000 unit) Tab Take 1,000 Units by mouth once daily.      clotrimazole-betamethasone 1-0.05% (LOTRISONE) cream Apply topically 2 (two) times daily. 45 g 1    estradioL (ESTRACE) 0.01 % (0.1 mg/gram) vaginal cream Place 1 g vaginally every other day. 42.5 g 2    fluticasone propionate (FLONASE) 50 mcg/actuation nasal spray 2 sprays (100 mcg total) by Each Nostril route once daily. 16 g 5    lisinopriL-hydrochlorothiazide (PRINZIDE,ZESTORETIC) 10-12.5 mg per tablet TAKE 1 TABLET BY MOUTH EVERY DAY 90 tablet 2    mupirocin (BACTROBAN) 2 % ointment Apply topically 3 (three) times daily. 1 each 0    predniSONE (DELTASONE) 2.5 MG tablet TAKE 1 TABLET BY MOUTH EVERY DAY 90  tablet 3     No current facility-administered medications on file prior to visit.   [2]   Social History  Socioeconomic History    Marital status:    Tobacco Use    Smoking status: Never    Smokeless tobacco: Never   Substance and Sexual Activity    Alcohol use: Not Currently     Comment: once or month a year one glass of wine    Drug use: No    Sexual activity: Not Currently     Partners: Male   Social History Narrative    The patient is  to a smoker who smokes outside the home. 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 She has 2 adult children.      Social Drivers of Health     Financial Resource Strain: Medium Risk (6/18/2024)    Overall Financial Resource Strain (CARDIA)     Difficulty of Paying Living Expenses: Somewhat hard   Food Insecurity: No Food Insecurity (6/18/2024)    Hunger Vital Sign     Worried About Running Out of Food in the Last Year: Never true     Ran Out of Food in the Last Year: Never true   Transportation Needs: No Transportation Needs (6/18/2024)    PRAPARE - Transportation     Lack of Transportation (Medical): No     Lack of Transportation (Non-Medical): No   Physical Activity: Inactive (6/18/2024)    Exercise Vital Sign     Days of Exercise per Week: 0 days     Minutes of Exercise per Session: 0 min   Stress: No Stress Concern Present (6/18/2024)    [employer] - Occupational Stress Questionnaire     Feeling of Stress : Only a little   Housing Stability: Low Risk  (1/20/2023)    Housing Stability Vital Sign     Unable to Pay for Housing in the Last Year: No     Number of Places Lived in the Last Year: 1     Unstable Housing in the Last Year: No

## 2025-05-05 ENCOUNTER — TELEPHONE (OUTPATIENT)
Dept: GASTROENTEROLOGY | Facility: CLINIC | Age: 75
End: 2025-05-05
Payer: MEDICARE

## 2025-05-05 NOTE — TELEPHONE ENCOUNTER
Can you help me with this,  is out and pt procedure is thusrsday please advise.  Contacted pt in regards to the medication Imuran she is currently taking. Pt stated she was having surgery she is having a Colporrhaphy on Thursday,and she was told to ask how to take the medication. Pt wanted to know should she stop taking her medication until procedure is done or continue taking medication as is. Please advise.

## 2025-05-05 NOTE — TELEPHONE ENCOUNTER
Contacted pt in regards to the medication Imuran she is currently taking. Pt stated she was having surgery she is having a Colporrhaphy on Thursday,and she was told to ask how to take the medication. Pt wanted to know should she stop taking her medication until procedure is done or continue taking medication as is. Please advise.

## 2025-05-05 NOTE — TELEPHONE ENCOUNTER
----- Message from JOSE Varma sent at 5/5/2025  8:51 AM CDT -----  Good morning,This pt is having a colporrhaphy with Dr. Mahoney on Thur. May 8 and needs instructions on her Imuran. If someone could please reach out to this pt or let me know and I'll reach out to her.Thanks,Kojo

## 2025-05-06 NOTE — TELEPHONE ENCOUNTER
Contacted pt in regards to the Imuran. Pt was advised per Dr. Branham that she can continue medication Pt verbalized understanding

## 2025-05-07 ENCOUNTER — TELEPHONE (OUTPATIENT)
Dept: PREADMISSION TESTING | Facility: HOSPITAL | Age: 75
End: 2025-05-07
Payer: MEDICARE

## 2025-05-07 PROBLEM — N81.10 BADEN-WALKER GRADE 3 CYSTOCELE: Status: ACTIVE | Noted: 2025-05-07

## 2025-05-07 NOTE — SUBJECTIVE & OBJECTIVE
OB History    Para Term  AB Living   3 2 0 0 1 2   SAB IAB Ectopic Multiple Live Births   1 0 0 0 0      # Outcome Date GA Lbr Vaibhav/2nd Weight Sex Type Anes PTL Lv   3 SAB            2 Para            1 Para              Past Medical History:   Diagnosis Date    Allergic rhinitis     Anemia     Anxiety associated with depression     Autoimmune hepatitis     DJD (degenerative joint disease), lumbar     Essential hypertension     History of blood transfusion     Hyperlipidemia     Osteoarthritis of both hips     Osteoarthritis of right knee     Osteopenia     Sacroiliac joint disease     Tubular adenoma of colon     Vitamin D deficiency disease      Past Surgical History:   Procedure Laterality Date    APPENDECTOMY      COLONOSCOPY N/A 2016    Procedure: COLONOSCOPY;  Surgeon: Bran Reeves III, MD;  Location: [facility] ENDO;  Service: Endoscopy;  Laterality: N/A;    COLONOSCOPY N/A 2022    Procedure: COLONOSCOPY;  Surgeon: Bran Reeves III, MD;  Location: [facility] ENDO;  Service: Endoscopy;  Laterality: N/A;    INGUINAL HERNIA REPAIR Left 2019    JOINT REPLACEMENT Right 10/2013    knee    ORIF FINGER FRACTURE Right     right middle finger    TOTAL ABDOMINAL HYSTERECTOMY         No medications prior to admission.       Review of patient's allergies indicates:   Allergen Reactions    No known drug allergies         Family History       Problem Relation (Age of Onset)    Anesthesia problems Sister    Cancer Sister    Cataracts Mother    Colon cancer Mother (89)    Dementia Mother    Diabetes Sister, Daughter    Heart attack Brother (48)    Hypertension Mother, Sister, Sister    Kidney failure Sister    Lupus Other    No Known Problems Father    Stroke Mother (70)          Tobacco Use    Smoking status: Never    Smokeless tobacco: Never   Substance and Sexual Activity    Alcohol use: Not Currently     Comment: once or month a year one glass of wine    Drug use: No    Sexual activity: Not  Currently     Partners: Male     Review of Systems   Constitutional:  Negative for appetite change, chills, fatigue, fever and unexpected weight change.   HENT: Negative.     Eyes:  Negative for visual disturbance.   Respiratory:  Negative for shortness of breath and wheezing.    Cardiovascular:  Negative for chest pain, palpitations and leg swelling.   Gastrointestinal:  Negative for abdominal pain, bloating, blood in stool, constipation, diarrhea, nausea and vomiting.   Endocrine: Negative for hair loss, hot flashes and hypothyroidism.   Genitourinary:  Negative for dysmenorrhea, dyspareunia, dysuria, flank pain, frequency, genital sores, hematuria, menorrhagia, menstrual problem, pelvic pain, urgency, vaginal bleeding, vaginal discharge, urinary incontinence and vaginal odor.   Musculoskeletal:  Negative for back pain, joint swelling and myalgias.   Integumentary:  Negative for rash, hair changes, breast mass, nipple discharge and breast skin changes.   Neurological:  Negative for syncope and headaches.   Hematological:  Negative for adenopathy. Does not bruise/bleed easily.   Psychiatric/Behavioral:  Negative for depression and sleep disturbance. The patient is not nervous/anxious.    Breast: Negative for mass, mastodynia, nipple discharge and skin changes     Objective:     Vital Signs (Most Recent):    Vital Signs (24h Range):           There is no height or weight on file to calculate BMI.    No LMP recorded. Patient has had a hysterectomy.     Physical Exam:   Constitutional: She is oriented to person, place, and time. Vital signs are normal. She appears well-developed and well-nourished. No distress.    HENT:   Head: Normocephalic and atraumatic.   Right Ear: External ear normal.   Left Ear: External ear normal.   Nose: Nose normal.    Eyes: Pupils are equal, round, and reactive to light. Conjunctivae are normal.    Neck: Trachea normal. No JVD present. No thyroid mass and no thyromegaly present.     Cardiovascular:  Normal rate and regular rhythm.             Pulmonary/Chest: Effort normal and breath sounds normal. No respiratory distress.        Abdominal: Soft. Bowel sounds are normal. She exhibits no distension and no mass. There is no abdominal tenderness. No hernia. Hernia confirmed negative in the ventral area, confirmed negative in the right inguinal area and confirmed negative in the left inguinal area.     Genitourinary:    Vagina and rectum normal.   Rectum:      No external hemorrhoid or abnormal anal tone.     Labial bartholins normal.There is no rash, tenderness or lesion on the right labia. There is no rash, tenderness or lesion on the left labia. Right adnexum displays no mass, no tenderness and no fullness. Left adnexum displays no mass, no tenderness and no fullness. There is cystocele (grade 3) in the vagina. No vaginal discharge, tenderness, bleeding, rectocele or prolapse of vaginal walls in the vagina.    No foreign body in the vagina.   Cervix is absent.Uterus is absent.           Musculoskeletal: Normal range of motion.       Neurological: She is alert and oriented to person, place, and time. She has normal reflexes.    Skin: Skin is warm and dry. She is not diaphoretic.    Psychiatric: She has a normal mood and affect. Her speech is normal and behavior is normal. Thought content normal.        Laboratory:  I have personallly reviewed all pertinent lab results from the last 24 hours.    Diagnostic Results:  Labs: Reviewed  ECG: Reviewed

## 2025-05-07 NOTE — H&P
'[facility] Surgery ([facility])  Obstetrics & Gynecology  History & Physical    Patient Name: Julianna Montemayor  MRN: [id number]  Admission Date: (Not on file)  Primary Care Provider: Juhi Wilburn MD    Subjective:     Chief Complaint/Reason for Admission:     History of Present Illness:  Symptomatic cystocele with no aristeo for surgical repair         OB History    Para Term  AB Living   3 2 0 0 1 2   SAB IAB Ectopic Multiple Live Births   1 0 0 0 0      # Outcome Date GA Lbr Vaibhav/2nd Weight Sex Type Anes PTL Lv   3 SAB            2 Para            1 Para              Past Medical History:   Diagnosis Date    Allergic rhinitis     Anemia     Anxiety associated with depression     Autoimmune hepatitis     DJD (degenerative joint disease), lumbar     Essential hypertension     History of blood transfusion     Hyperlipidemia     Osteoarthritis of both hips     Osteoarthritis of right knee     Osteopenia     Sacroiliac joint disease     Tubular adenoma of colon     Vitamin D deficiency disease      Past Surgical History:   Procedure Laterality Date    APPENDECTOMY      COLONOSCOPY N/A 2016    Procedure: COLONOSCOPY;  Surgeon: Bran Reeves III, MD;  Location: [facility] ENDO;  Service: Endoscopy;  Laterality: N/A;    COLONOSCOPY N/A 2022    Procedure: COLONOSCOPY;  Surgeon: Bran Reeves III, MD;  Location: [facility];  Service: Endoscopy;  Laterality: N/A;    INGUINAL HERNIA REPAIR Left 2019    JOINT REPLACEMENT Right 10/2013    knee    ORIF FINGER FRACTURE Right     right middle finger    TOTAL ABDOMINAL HYSTERECTOMY         No medications prior to admission.       Review of patient's allergies indicates:   Allergen Reactions    No known drug allergies         Family History       Problem Relation (Age of Onset)    Anesthesia problems Sister    Cancer Sister    Cataracts Mother    Colon cancer Mother (89)    Dementia Mother    Diabetes Sister, Daughter    Heart attack Brother (48)     Hypertension Mother, Sister, Sister    Kidney failure Sister    Lupus Other    No Known Problems Father    Stroke Mother (70)          Tobacco Use    Smoking status: Never    Smokeless tobacco: Never   Substance and Sexual Activity    Alcohol use: Not Currently     Comment: once or month a year one glass of wine    Drug use: No    Sexual activity: Not Currently     Partners: Male     Review of Systems   Constitutional:  Negative for appetite change, chills, fatigue, fever and unexpected weight change.   HENT: Negative.     Eyes:  Negative for visual disturbance.   Respiratory:  Negative for shortness of breath and wheezing.    Cardiovascular:  Negative for chest pain, palpitations and leg swelling.   Gastrointestinal:  Negative for abdominal pain, bloating, blood in stool, constipation, diarrhea, nausea and vomiting.   Endocrine: Negative for hair loss, hot flashes and hypothyroidism.   Genitourinary:  Negative for dysmenorrhea, dyspareunia, dysuria, flank pain, frequency, genital sores, hematuria, menorrhagia, menstrual problem, pelvic pain, urgency, vaginal bleeding, vaginal discharge, urinary incontinence and vaginal odor.   Musculoskeletal:  Negative for back pain, joint swelling and myalgias.   Integumentary:  Negative for rash, hair changes, breast mass, nipple discharge and breast skin changes.   Neurological:  Negative for syncope and headaches.   Hematological:  Negative for adenopathy. Does not bruise/bleed easily.   Psychiatric/Behavioral:  Negative for depression and sleep disturbance. The patient is not nervous/anxious.    Breast: Negative for mass, mastodynia, nipple discharge and skin changes     Objective:     Vital Signs (Most Recent):    Vital Signs (24h Range):           There is no height or weight on file to calculate BMI.    No LMP recorded. Patient has had a hysterectomy.     Physical Exam:   Constitutional: She is oriented to person, place, and time. Vital signs are normal. She appears  well-developed and well-nourished. No distress.    HENT:   Head: Normocephalic and atraumatic.   Right Ear: External ear normal.   Left Ear: External ear normal.   Nose: Nose normal.    Eyes: Pupils are equal, round, and reactive to light. Conjunctivae are normal.    Neck: Trachea normal. No JVD present. No thyroid mass and no thyromegaly present.    Cardiovascular:  Normal rate and regular rhythm.             Pulmonary/Chest: Effort normal and breath sounds normal. No respiratory distress.        Abdominal: Soft. Bowel sounds are normal. She exhibits no distension and no mass. There is no abdominal tenderness. No hernia. Hernia confirmed negative in the ventral area, confirmed negative in the right inguinal area and confirmed negative in the left inguinal area.     Genitourinary:    Vagina and rectum normal.   Rectum:      No external hemorrhoid or abnormal anal tone.     Labial bartholins normal.There is no rash, tenderness or lesion on the right labia. There is no rash, tenderness or lesion on the left labia. Right adnexum displays no mass, no tenderness and no fullness. Left adnexum displays no mass, no tenderness and no fullness. There is cystocele (grade 3) in the vagina. No vaginal discharge, tenderness, bleeding, rectocele or prolapse of vaginal walls in the vagina.    No foreign body in the vagina.   Cervix is absent.Uterus is absent.           Musculoskeletal: Normal range of motion.       Neurological: She is alert and oriented to person, place, and time. She has normal reflexes.    Skin: Skin is warm and dry. She is not diaphoretic.    Psychiatric: She has a normal mood and affect. Her speech is normal and behavior is normal. Thought content normal.        Laboratory:  I have personallly reviewed all pertinent lab results from the last 24 hours.    Diagnostic Results:  Labs: Reviewed  ECG: Reviewed  Assessment/Plan:     Renal/  * [place] grade 3 cystocele  Anterior colporrhaphy         F Manuel  MD Cathy  Obstetrics & Gynecology  '[place] - Surgery ([facility])

## 2025-05-07 NOTE — PRE-PROCEDURE INSTRUCTIONS
Called and spoke with PATIENT about the following:     Please arrive to [facility] at 5:30AM on 5/8/25 for your scheduled procedure.  Address: [address] [place] [address] [place] [address] (2nd Building on left, 1st Floor Lobby)    !!!NO FOOD after midnight! You may have clear liquids up to 3 hrs before your arrival to the Hospital!!!  Clear liquids include Gatorade, water, soda, black coffee or tea (no milk or creamer), and clear juices.  Clear liquids do NOT include anything with pulp or food particles (Chicken broth, ice cream, yogurt, Jello, etc.)    Thank you,  -[facility] Pre Admit Dept.  Mon-Fri 7:30 am - 4 pm [phone]

## 2025-05-08 ENCOUNTER — ANESTHESIA EVENT (OUTPATIENT)
Dept: SURGERY | Facility: HOSPITAL | Age: 75
End: 2025-05-08
Payer: MEDICARE

## 2025-05-08 ENCOUNTER — HOSPITAL ENCOUNTER (OUTPATIENT)
Facility: HOSPITAL | Age: 75
Discharge: HOME | End: 2025-05-09
Attending: OBSTETRICS & GYNECOLOGY | Admitting: OBSTETRICS & GYNECOLOGY
Payer: MEDICARE

## 2025-05-08 ENCOUNTER — ANESTHESIA (OUTPATIENT)
Dept: SURGERY | Facility: HOSPITAL | Age: 75
End: 2025-05-08
Payer: MEDICARE

## 2025-05-08 DIAGNOSIS — N81.10 BADEN-WALKER GRADE 3 CYSTOCELE: ICD-10-CM

## 2025-05-08 PROCEDURE — 57240 ANTERIOR COLPORRHAPHY: CPT | Mod: ,,, | Performed by: OBSTETRICS & GYNECOLOGY

## 2025-05-08 PROCEDURE — 36000707: Performed by: OBSTETRICS & GYNECOLOGY

## 2025-05-08 PROCEDURE — 63600175 PHARM REV CODE 636 W HCPCS: Performed by: ANESTHESIOLOGY

## 2025-05-08 PROCEDURE — 63600175 PHARM REV CODE 636 W HCPCS: Performed by: OBSTETRICS & GYNECOLOGY

## 2025-05-08 PROCEDURE — 37000008 HC ANESTHESIA 1ST 15 MINUTES: Performed by: OBSTETRICS & GYNECOLOGY

## 2025-05-08 PROCEDURE — 63600175 PHARM REV CODE 636 W HCPCS: Performed by: NURSE ANESTHETIST, CERTIFIED REGISTERED

## 2025-05-08 PROCEDURE — 71000033 HC RECOVERY, INTIAL HOUR: Performed by: OBSTETRICS & GYNECOLOGY

## 2025-05-08 PROCEDURE — 63600175 PHARM REV CODE 636 W HCPCS: Mod: JZ,TB | Performed by: OBSTETRICS & GYNECOLOGY

## 2025-05-08 PROCEDURE — 25000003 PHARM REV CODE 250: Performed by: NURSE ANESTHETIST, CERTIFIED REGISTERED

## 2025-05-08 PROCEDURE — 36000706: Performed by: OBSTETRICS & GYNECOLOGY

## 2025-05-08 PROCEDURE — 71000039 HC RECOVERY, EACH ADD'L HOUR: Performed by: OBSTETRICS & GYNECOLOGY

## 2025-05-08 PROCEDURE — 57240 ANTERIOR COLPORRHAPHY: CPT | Mod: AS,,, | Performed by: PHYSICIAN ASSISTANT

## 2025-05-08 PROCEDURE — 25000003 PHARM REV CODE 250: Performed by: OBSTETRICS & GYNECOLOGY

## 2025-05-08 PROCEDURE — 37000009 HC ANESTHESIA EA ADD 15 MINS: Performed by: OBSTETRICS & GYNECOLOGY

## 2025-05-08 RX ORDER — DIPHENHYDRAMINE HCL 25 MG
25 CAPSULE ORAL EVERY 4 HOURS PRN
Status: DISCONTINUED | OUTPATIENT
Start: 2025-05-08 | End: 2025-05-09 | Stop reason: HOSPADM

## 2025-05-08 RX ORDER — CHLORHEXIDINE GLUCONATE ORAL RINSE 1.2 MG/ML
10 SOLUTION DENTAL 2 TIMES DAILY
Status: DISCONTINUED | OUTPATIENT
Start: 2025-05-08 | End: 2025-05-09 | Stop reason: HOSPADM

## 2025-05-08 RX ORDER — HYDROCODONE BITARTRATE AND ACETAMINOPHEN 5; 325 MG/1; MG/1
1 TABLET ORAL EVERY 4 HOURS PRN
Status: DISCONTINUED | OUTPATIENT
Start: 2025-05-08 | End: 2025-05-09 | Stop reason: HOSPADM

## 2025-05-08 RX ORDER — LISINOPRIL 10 MG/1
10 TABLET ORAL DAILY
Status: DISCONTINUED | OUTPATIENT
Start: 2025-05-08 | End: 2025-05-09 | Stop reason: HOSPADM

## 2025-05-08 RX ORDER — LISINOPRIL AND HYDROCHLOROTHIAZIDE 10; 12.5 MG/1; MG/1
1 TABLET ORAL DAILY
Status: DISCONTINUED | OUTPATIENT
Start: 2025-05-08 | End: 2025-05-08

## 2025-05-08 RX ORDER — ONDANSETRON HYDROCHLORIDE 2 MG/ML
INJECTION, SOLUTION INTRAVENOUS
Status: DISCONTINUED | OUTPATIENT
Start: 2025-05-08 | End: 2025-05-08

## 2025-05-08 RX ORDER — ROCURONIUM BROMIDE 10 MG/ML
INJECTION, SOLUTION INTRAVENOUS
Status: DISCONTINUED | OUTPATIENT
Start: 2025-05-08 | End: 2025-05-08

## 2025-05-08 RX ORDER — HYDROMORPHONE HCL 2 MG/ML
0.2 VIAL (ML) INJECTION
Refills: 0 | Status: DISCONTINUED | OUTPATIENT
Start: 2025-05-08 | End: 2025-05-08 | Stop reason: HOSPADM

## 2025-05-08 RX ORDER — PROPOFOL 10 MG/ML
VIAL (ML) INTRAVENOUS
Status: DISCONTINUED | OUTPATIENT
Start: 2025-05-08 | End: 2025-05-08

## 2025-05-08 RX ORDER — CEFAZOLIN 2 G/1
2 INJECTION, POWDER, FOR SOLUTION INTRAMUSCULAR; INTRAVENOUS
Status: DISCONTINUED | OUTPATIENT
Start: 2025-05-08 | End: 2025-05-08 | Stop reason: HOSPADM

## 2025-05-08 RX ORDER — ONDANSETRON 8 MG/1
8 TABLET, ORALLY DISINTEGRATING ORAL EVERY 8 HOURS PRN
Status: DISCONTINUED | OUTPATIENT
Start: 2025-05-08 | End: 2025-05-09 | Stop reason: HOSPADM

## 2025-05-08 RX ORDER — AZATHIOPRINE 50 MG/1
50 TABLET ORAL DAILY
Status: DISCONTINUED | OUTPATIENT
Start: 2025-05-08 | End: 2025-05-09 | Stop reason: HOSPADM

## 2025-05-08 RX ORDER — KETOROLAC TROMETHAMINE 30 MG/ML
15 INJECTION, SOLUTION INTRAMUSCULAR; INTRAVENOUS EVERY 6 HOURS
Status: COMPLETED | OUTPATIENT
Start: 2025-05-08 | End: 2025-05-09

## 2025-05-08 RX ORDER — HYDROCODONE BITARTRATE AND ACETAMINOPHEN 5; 325 MG/1; MG/1
1 TABLET ORAL EVERY 6 HOURS PRN
Qty: 15 TABLET | Refills: 0 | Status: SHIPPED | OUTPATIENT
Start: 2025-05-08 | End: 2025-05-15

## 2025-05-08 RX ORDER — SODIUM CHLORIDE, SODIUM LACTATE, POTASSIUM CHLORIDE, CALCIUM CHLORIDE 600; 310; 30; 20 MG/100ML; MG/100ML; MG/100ML; MG/100ML
INJECTION, SOLUTION INTRAVENOUS CONTINUOUS
Status: ACTIVE | OUTPATIENT
Start: 2025-05-08 | End: 2025-05-08

## 2025-05-08 RX ORDER — PREDNISONE 2.5 MG/1
2.5 TABLET ORAL DAILY
Status: DISCONTINUED | OUTPATIENT
Start: 2025-05-09 | End: 2025-05-09 | Stop reason: HOSPADM

## 2025-05-08 RX ORDER — LIDOCAINE HYDROCHLORIDE 10 MG/ML
INJECTION, SOLUTION EPIDURAL; INFILTRATION; INTRACAUDAL; PERINEURAL
Status: DISCONTINUED | OUTPATIENT
Start: 2025-05-08 | End: 2025-05-08

## 2025-05-08 RX ORDER — HYDROCHLOROTHIAZIDE 12.5 MG/1
12.5 TABLET ORAL DAILY
Status: DISCONTINUED | OUTPATIENT
Start: 2025-05-08 | End: 2025-05-09 | Stop reason: HOSPADM

## 2025-05-08 RX ORDER — FAMOTIDINE 20 MG/1
20 TABLET, FILM COATED ORAL
Status: COMPLETED | OUTPATIENT
Start: 2025-05-08 | End: 2025-05-08

## 2025-05-08 RX ORDER — HYDROMORPHONE HYDROCHLORIDE 1 MG/ML
1 INJECTION, SOLUTION INTRAMUSCULAR; INTRAVENOUS; SUBCUTANEOUS EVERY 4 HOURS PRN
Status: DISCONTINUED | OUTPATIENT
Start: 2025-05-08 | End: 2025-05-09 | Stop reason: HOSPADM

## 2025-05-08 RX ORDER — IBUPROFEN 800 MG/1
800 TABLET, FILM COATED ORAL EVERY 8 HOURS
Status: DISCONTINUED | OUTPATIENT
Start: 2025-05-09 | End: 2025-05-09 | Stop reason: HOSPADM

## 2025-05-08 RX ORDER — ACETAMINOPHEN 500 MG
1000 TABLET ORAL
Status: COMPLETED | OUTPATIENT
Start: 2025-05-08 | End: 2025-05-08

## 2025-05-08 RX ADMIN — ACETAMINOPHEN 1000 MG: 500 TABLET ORAL at 05:05

## 2025-05-08 RX ADMIN — HYDROMORPHONE HYDROCHLORIDE 0.2 MG: 2 INJECTION INTRAMUSCULAR; INTRAVENOUS; SUBCUTANEOUS at 08:05

## 2025-05-08 RX ADMIN — ONDANSETRON 4 MG: 2 INJECTION INTRAMUSCULAR; INTRAVENOUS at 07:05

## 2025-05-08 RX ADMIN — LISINOPRIL 10 MG: 10 TABLET ORAL at 09:05

## 2025-05-08 RX ADMIN — ROCURONIUM BROMIDE 50 MG: 10 INJECTION, SOLUTION INTRAVENOUS at 07:05

## 2025-05-08 RX ADMIN — SODIUM CHLORIDE, SODIUM LACTATE, POTASSIUM CHLORIDE, AND CALCIUM CHLORIDE: .6; .31; .03; .02 INJECTION, SOLUTION INTRAVENOUS at 06:05

## 2025-05-08 RX ADMIN — CHLORHEXIDINE GLUCONATE 0.12% ORAL RINSE 10 ML: 1.2 LIQUID ORAL at 09:05

## 2025-05-08 RX ADMIN — LIDOCAINE HYDROCHLORIDE 50 MG: 10 SOLUTION INTRAVENOUS at 07:05

## 2025-05-08 RX ADMIN — SODIUM CHLORIDE, POTASSIUM CHLORIDE, SODIUM LACTATE AND CALCIUM CHLORIDE: 600; 310; 30; 20 INJECTION, SOLUTION INTRAVENOUS at 09:05

## 2025-05-08 RX ADMIN — AZATHIOPRINE 50 MG: 50 TABLET ORAL at 10:05

## 2025-05-08 RX ADMIN — KETOROLAC TROMETHAMINE 15 MG: 30 INJECTION, SOLUTION INTRAMUSCULAR; INTRAVENOUS at 05:05

## 2025-05-08 RX ADMIN — HYDROCHLOROTHIAZIDE 12.5 MG: 12.5 TABLET ORAL at 09:05

## 2025-05-08 RX ADMIN — CEFAZOLIN 2 G: 2 INJECTION, POWDER, FOR SOLUTION INTRAMUSCULAR; INTRAVENOUS at 07:05

## 2025-05-08 RX ADMIN — KETOROLAC TROMETHAMINE 15 MG: 30 INJECTION, SOLUTION INTRAMUSCULAR; INTRAVENOUS at 11:05

## 2025-05-08 RX ADMIN — FAMOTIDINE 20 MG: 20 TABLET, FILM COATED ORAL at 05:05

## 2025-05-08 RX ADMIN — PROPOFOL 100 MG: 10 INJECTION, EMULSION INTRAVENOUS at 07:05

## 2025-05-08 RX ADMIN — SUGAMMADEX 200 MG: 100 INJECTION, SOLUTION INTRAVENOUS at 07:05

## 2025-05-08 NOTE — ANESTHESIA PROCEDURE NOTES
Intubation    Date/Time: 5/8/2025 7:06 AM    Performed by: Mikki Stubbs CRNA  Authorized by: Lyle Berger II, MD    Intubation:     Induction:  Intravenous    Intubated:  Postinduction    Mask Ventilation:  Easy mask    Attempts:  1    Attempted By:  CRNA    Method of Intubation:  Direct    Blade:  Celia 3    Laryngeal View Grade: Grade I - full view of cords      Difficult Airway Encountered?: No      Complications:  None    Airway Device:  Oral endotracheal tube    Airway Device Size:  7.0    Style/Cuff Inflation:  Cuffed (inflated to minimal occlusive pressure)    Tube secured:  20    Secured at:  The lips    Placement Verified By:  Capnometry and Revisualization with laryngoscopy    Complicating Factors:  None    Findings Post-Intubation:  BS equal bilateral and atraumatic/condition of teeth unchanged

## 2025-05-08 NOTE — TRANSFER OF CARE
Patient:   Milton Kelly             MRN: [id number]            FIN: [id number]               Age:   52 years     Sex:  Male     :  1967   Associated Diagnoses:   None   Author:   Tony Hansen MD      Preoperative Diagnosis: Cataract Right eye    Postoperative Diagnosis: Cataract Right eye    Procedure: Phacoemulsification with intaocular lens implantations Right eye    Surgeon: Tony Hansen MD    Assistant: Felecia Chavarria, [facility]    Anestheisa: MAC    Complications: None    The patient was brought into the operating suite, where the patient was correctly identified as was the operative eye via timeout.  The patient was prepped and draped in a sterile ophthalmic fashion.  A lid speculum was placed in the operative eye and the microscope was brought into place.  A 1.0mm paracentesis was then made at (12) o'clock.  The anterior chamber was filled with Endocoat.  A (temporal) clear corneal incision was made with a 2.4 mm keratome.  A 6 mm corneal marking ring was used to brittaney the cornea centered over the visual axis.  A 5.00 mm continuous curvilinear capsulorhexis was fashioned using a cystotome and microcapsular forceps.  Hydrodissection and hydrodelineation was performed with upreserved 1% Xylocaine.  The nucleus was then phacoemulsified with the Abbott phacoemulsification hand-piece with a total of (6) EFX.  The cortex was then removed with the I/A hand-piece. The lens model ([id number]) with a power of (20.0) was placed in the capsular bag.  The Helon was then removed from the eye with the I/A hand piece.  The anterior chamber was inflated and the wounds were hydrated with BSS.  The wounds were checked with Weck-Divya sponges and found to be watertight.  The lid speculum was removed and topical antibiotics were placed on the operative eye.  The patient was brought to PACU in good condition.    "Anesthesia Transfer of Care Note    Patient: Julianna Montemayor    Procedure(s) Performed: Procedure(s) (LRB):  COLPORRHAPHY, ANTERIOR (N/A)    Patient location: PACU    Anesthesia Type: general    Transport from OR: Transported from OR on room air with adequate spontaneous ventilation    Post pain: adequate analgesia    Post assessment: no apparent anesthetic complications    Post vital signs: stable    Level of consciousness: sedated    Nausea/Vomiting: no nausea/vomiting    Complications: none    Transfer of care protocol was followed      Last vitals: Visit Vitals  /81 (BP Location: Right arm, Patient Position: Sitting)   Pulse (!) 51   Temp 36.3 °C (97.3 °F) (Temporal)   Resp 18   Ht 5' 5" (1.651 m)   Wt 62.5 kg (137 lb 12.6 oz)   SpO2 100%   Breastfeeding No   BMI 22.93 kg/m²     "

## 2025-05-08 NOTE — OP NOTE
[facility] ([facility])  Surgery Department  Operative Note    SUMMARY     Date of Procedure: 5/8/2025     Procedure: Procedure(s) (LRB):  COLPORRHAPHY, ANTERIOR (N/A)     Surgeons and Role:     * HIRA Morgan MD - Primary    Assisting Surgeon:     Soumya MARIN assistance deemed necessary by MD     Pre-Operative Diagnosis: [place]-Walker grade 3 cystocele [N81.10]    Post-Operative Diagnosis: Post-Op Diagnosis Codes:     * [place]-Walker grade 3 cystocele [N81.10]    Anesthesia: General    Operative Findings (including complications, if any):      Grade 3 cystocele with good posterior and cuff support, no vaginal lesions.     Description of Technical Procedures:       SUMMARY:  She was taken to the surgical suite.  General intubation           anesthesia induced.  She was placed in supine lithotomy position.  The       vagina was prepped with Betadine, sterile drapes were applied and            time-out                                                                     taken.  The bladder was drained with a straight catheter.  Anatomy was       identified with a weighted speculum and the vaginal cuff was grasped at      its                                                                          angles and an incision was made transversely and dissection was made in      the                                                                          midline towards the urethra.  The underlying fascial layers were             dissected                                                                    off the vaginal mucosa.  Once this was accomplished, the cystocele was       reduced with interrupted 2-0 Vicryl.  The vaginal mucosa trimmed and         closed                                                                       in the midline with running 2 zero Vicryl     Good hemostasis along the incision line noted                                                           Vag-pack placed with 2  inch Iodofoam     Mancera catheter placed.  The patient was           awakened                Significant Surgical Tasks Conducted by the Assistant(s), if Applicable: first assist     Estimated Blood Loss (EBL): 50 mL           Implants: * No implants in log *    Specimens:   Specimen (24h ago, onward)      None           * No specimens in log *           Condition: Good    Disposition: PACU - hemodynamically stable.    Attestation: Op Note Attestation: I was physically present and scrubbed for the entire procedure.

## 2025-05-08 NOTE — NURSING
Discussed poc with pt, pt verbalized understanding    Purposeful rounding every 2hours    VS wnl  Fall precautions in place, remains injury free  Pain and nausea under control with PRN meds    IVFs  Accurate I&Os  Abx given as prescribed  Bed locked at lowest position  Call light within reach    Chart check complete  Will cont with POC

## 2025-05-08 NOTE — ANESTHESIA PREPROCEDURE EVALUATION
05/08/2025  Julianna Montemayor is a 74 y.o., female.      Pre-op Assessment    I have reviewed the Patient Summary Reports.     I have reviewed the Nursing Notes. I have reviewed the NPO Status.      Review of Systems  Anesthesia Hx:  No problems with previous Anesthesia                Hematology/Oncology:  Hematology Normal   Oncology Normal                                   Cardiovascular:     Hypertension                                          Renal/:  Chronic Renal Disease                Hepatic/GI:      Liver Disease, Hepatitis              Musculoskeletal:  Arthritis               Neurological:  Neurology Normal                                      Endocrine:  Endocrine Normal            Dermatological:  Skin Normal    Psych:  Psychiatric History                Patient Active Problem List   Diagnosis    Autoimmune hepatitis    Anemia    Essential hypertension    Hyperlipidemia    Osteopenia    Vitamin D deficiency disease    Osteoarthritis of right knee    Anxiety associated with depression    Immunosuppressed status    Chronic kidney disease, stage 3a    [place]-Walker grade 3 cystocele       Physical Exam  General: Well nourished, Cooperative, Alert and Oriented    Airway:  Mallampati: II / II  Mouth Opening: Normal  TM Distance: Normal  Tongue: Normal  Neck ROM: Normal ROM    Dental:  Intact        Anesthesia Plan  Type of Anesthesia, risks & benefits discussed:    Anesthesia Type: Gen ETT  Intra-op Monitoring Plan: Standard ASA Monitors  Post Op Pain Control Plan: multimodal analgesia  Induction:  IV  Airway Plan: Direct  Informed Consent: Informed consent signed with the Patient and all parties understand the risks and agree with anesthesia plan.  All questions answered.   ASA Score: 3  Day of Surgery Review of History & Physical: H&P Update referred to the surgeon/provider.I have interviewed and  examined the patient. I have reviewed the patient's H&P dated: There are no significant changes. H&P completed by Anesthesiologist.    Ready For Surgery From Anesthesia Perspective.     .

## 2025-05-09 VITALS
HEART RATE: 53 BPM | WEIGHT: 137.81 LBS | TEMPERATURE: 98 F | DIASTOLIC BLOOD PRESSURE: 73 MMHG | HEIGHT: 65 IN | OXYGEN SATURATION: 99 % | SYSTOLIC BLOOD PRESSURE: 124 MMHG | RESPIRATION RATE: 18 BRPM | BODY MASS INDEX: 22.96 KG/M2

## 2025-05-09 PROCEDURE — 99900035 HC TECH TIME PER 15 MIN (STAT)

## 2025-05-09 PROCEDURE — 25000003 PHARM REV CODE 250: Performed by: OBSTETRICS & GYNECOLOGY

## 2025-05-09 PROCEDURE — 63600175 PHARM REV CODE 636 W HCPCS: Performed by: OBSTETRICS & GYNECOLOGY

## 2025-05-09 PROCEDURE — 94799 UNLISTED PULMONARY SVC/PX: CPT

## 2025-05-09 RX ADMIN — AZATHIOPRINE 50 MG: 50 TABLET ORAL at 08:05

## 2025-05-09 RX ADMIN — HYDROCODONE BITARTRATE AND ACETAMINOPHEN 1 TABLET: 5; 325 TABLET ORAL at 10:05

## 2025-05-09 RX ADMIN — LISINOPRIL 10 MG: 10 TABLET ORAL at 08:05

## 2025-05-09 RX ADMIN — KETOROLAC TROMETHAMINE 15 MG: 30 INJECTION, SOLUTION INTRAMUSCULAR; INTRAVENOUS at 02:05

## 2025-05-09 RX ADMIN — PREDNISONE 2.5 MG: 2.5 TABLET ORAL at 08:05

## 2025-05-09 RX ADMIN — CHLORHEXIDINE GLUCONATE 0.12% ORAL RINSE 10 ML: 1.2 LIQUID ORAL at 08:05

## 2025-05-09 RX ADMIN — HYDROCHLOROTHIAZIDE 12.5 MG: 12.5 TABLET ORAL at 08:05

## 2025-05-09 RX ADMIN — KETOROLAC TROMETHAMINE 15 MG: 30 INJECTION, SOLUTION INTRAMUSCULAR; INTRAVENOUS at 09:05

## 2025-05-09 NOTE — PLAN OF CARE
[facility]  Discharge Final Note    Primary Care Provider: Juhi Wilburn MD    Expected Discharge Date: 5/9/2025    Final Discharge Note (most recent)       Final Note - 05/09/25 1037          Final Note    Assessment Type Final Discharge Note     Anticipated Discharge Disposition Home or Self Care     Hospital Resources/Appts/Education Provided Appointments scheduled and added to AVS        Post-Acute Status    Discharge Delays None known at this time                   Contact Info       HIRA Morgan MD   Specialty: Obstetrics, Obstetrics and Gynecology    [address] [place] [id number]   Phone: [phone]       Next Steps: Follow up in 4 week(s)    Instructions: Postop check          Patient has no d/c needs at this time. Sw to follow up, as needed, for d/c planning purposes.

## 2025-05-09 NOTE — DISCHARGE SUMMARY
[facility]  Obstetrics & Gynecology  Discharge Summary    Patient Name: Julianna Montemayor  MRN: [id number]  Admission Date: 5/8/2025  Hospital Length of Stay: 0 days  Discharge Date and Time: 05/09/2025 9:15 AM  Attending Physician: HIRA Morgan MD   Discharging Provider: Gwen Flores MD  Primary Care Provider: Juhi Wilburn MD    HPI:  Symptomatic cystocele with no aristeo for surgical repair     Hospital Course:  Patient underwent anterior colporrhaphy without complication.  Normal post op course and stable for discharge to home on POD#1.    Goals of Care Treatment Preferences:  Code Status: Full Code      Procedure(s) (LRB):  COLPORRHAPHY, ANTERIOR (N/A)         Significant Diagnostic Studies: N/A      Pending Diagnostic Studies:       None          Final Active Diagnoses:    Diagnosis Date Noted POA    PRINCIPAL PROBLEM:  [place]-Walker grade 3 cystocele [N81.10] 05/07/2025 Yes      Problems Resolved During this Admission:        Discharged Condition: stable    Disposition: Home or Self Care    Follow Up:   Follow-up Information       HIRA Morgan MD Follow up in 4 week(s).    Specialties: Obstetrics, Obstetrics and Gynecology  Why: Postop check  Contact information:  [address] [place] [address] Drive  [facility] [address] [phone]                           Patient Instructions:      Diet Adult Regular     No driving until:   Order Comments: Two weeks post surgery.     Pelvic Rest   Order Comments: Pelvic rest X 6 weeks postpartum     Notify your health care provider if you experience any of the following:  temperature >100.4     Notify your health care provider if you experience any of the following:  persistent nausea and vomiting or diarrhea     Notify your health care provider if you experience any of the following:  severe uncontrolled pain     Notify your health care provider if you experience any of the following:  redness, tenderness, or signs of infection (pain, swelling,  redness, odor or green/yellow discharge around incision site)     Notify your health care provider if you experience any of the following:  difficulty breathing or increased cough     Notify your health care provider if you experience any of the following:  severe persistent headache     Notify your health care provider if you experience any of the following:  persistent dizziness, light-headedness, or visual disturbances     Notify your health care provider if you experience any of the following:  increased confusion or weakness     Activity as tolerated     Medications:  Reconciled Home Medications:      Medication List        START taking these medications      HYDROcodone-acetaminophen 5-325 mg per tablet  Commonly known as: NORCO  Take 1 tablet by mouth every 6 (six) hours as needed for Pain.            CONTINUE taking these medications      azaTHIOprine 50 mg Tab  Commonly known as: IMURAN  Take 1 tablet (50 mg total) by mouth once daily. Take 1/2 pill a day     CALCIUM-VITAMIN D ORAL  Take 1 tablet by mouth once daily. Calcium 600 and vitamin d 800IU/day     cholecalciferol (vitamin D3) 125 mcg (5,000 unit) Tab  Take 1,000 Units by mouth once daily.     clotrimazole-betamethasone 1-0.05% cream  Commonly known as: LOTRISONE  Apply topically 2 (two) times daily.     estradioL 0.01 % (0.1 mg/gram) vaginal cream  Commonly known as: ESTRACE  Place 1 g vaginally every other day.     lisinopriL-hydrochlorothiazide 10-12.5 mg per tablet  Commonly known as: PRINZIDE,ZESTORETIC  TAKE 1 TABLET BY MOUTH EVERY DAY     mupirocin 2 % ointment  Commonly known as: BACTROBAN  Apply topically 3 (three) times daily.     predniSONE 2.5 MG tablet  Commonly known as: DELTASONE  TAKE 1 TABLET BY MOUTH EVERY DAY              Gwen Flores MD  Obstetrics & Gynecology  [facility]

## 2025-05-09 NOTE — HOSPITAL COURSE
Patient underwent anterior colporrhaphy without complication.  Normal post op course and stable for discharge to home on POD#1.

## 2025-05-09 NOTE — PLAN OF CARE
Discussed plan of care with patient and this patient was able to , verbalized understanding.  Patient remains free from injury.  Safety and comfort precautions maintained this shift.   Call light and personal belongings within reach, bed in low position with bed wheels locked.  No s/s of acute distress.   Purposeful rounding continued this shift.  Pain levels  are controlled per MD order.  Cardiac monitoring in place.  Diet orders continued.  Vital signs continued per order.  Q2 repositioning per self  Patient mobility status remains with assist  Chart and orders review completed.   Patient education about care completed.     Problem: Adult Inpatient Plan of Care  Goal: Plan of Care Review  Outcome: Progressing  Goal: Patient-Specific Goal (Individualized)  Outcome: Progressing  Goal: Absence of Hospital-Acquired Illness or Injury  Outcome: Progressing  Goal: Optimal Comfort and Wellbeing  Outcome: Progressing  Goal: Readiness for Transition of Care  Outcome: Progressing     Problem: Infection  Goal: Absence of Infection Signs and Symptoms  Outcome: Progressing     Problem: Wound  Goal: Optimal Coping  Outcome: Progressing  Goal: Optimal Functional Ability  Outcome: Progressing  Goal: Absence of Infection Signs and Symptoms  Outcome: Progressing  Goal: Improved Oral Intake  Outcome: Progressing  Goal: Optimal Pain Control and Function  Outcome: Progressing  Goal: Skin Health and Integrity  Outcome: Progressing  Goal: Optimal Wound Healing  Outcome: Progressing

## 2025-05-14 NOTE — PROGRESS NOTES
Julianna Montemayor  MRN:  [id number]  74 y.o. female    ~~~~~~~~~~~~~~~~~~~~~~~~~~~~~~~~    Transitional Care Note    Family and/or Caretaker present at visit?  No  Diagnostic tests reviewed/disposition: No diagnosic tests pending after this hospitalization.  Disease/illness education:   Discussed, all questions answered.  Home health/community services discussion/referrals: Patient does not have home health established from hospital visit.  They do not need home health.  If needed, we will set up home health for the patient.   Establishment or re-establishment of referral orders for community resources: No other necessary community resources.   Discussion with other health care providers: No discussion with other health care providers necessary.      Patient Care Team:  Juhi Wilburn MD as PCP - General (Family Medicine)  Lyle Herrera MD as Consulting Physician (Ophthalmology)  Madison Carrillo MD as Consulting Physician (Gastroenterology)  HIRA Morgan MD (Gynecology)  Jet Patiño NP as Nurse Practitioner (Family Medicine)      SUBJECTIVE:      CHIEF COMPLAINT:  Hospital follow-up    HPI:    ADMITTED TO:  [facility]           DATES OF SERVICE:  5/8/25 to 5/9/25    HPI:  Symptomatic cystocele with no aristeo for surgical repair      Hospital Course:  Patient underwent anterior colporrhaphy without complication.  Normal post op course and stable for discharge to home on POD#1.    Procedure:  COLPORRHAPHY, ANTERIOR (N/A)                           Follow Up:  Dr. Natanael Morgan in 4 weeks for postop check      ````````````````````````````````````````    Ms. Montemayor reports recovering, doing well. Denies pain, vaginal discharge or bleeding. Surgery  was by way of transvag approach. Denies UI issues. She has a history of chronic anemia, with consistently low iron levels for at least 2 years. She is not currently taking any iron supplements.      REVIEW OF SYSTEMS:  Review of Systems   Constitutional:   "Negative for chills and fever.   HENT: Negative.     Respiratory: Negative.     Cardiovascular:  Negative for chest pain, palpitations and leg swelling.   Gastrointestinal: Negative.    Genitourinary:  Negative for enuresis, pelvic pain, urgency, vaginal bleeding, vaginal discharge and vaginal pain.   Neurological:  Negative for dizziness, tremors, syncope, weakness, light-headedness and headaches.        ALLERGIES:  Review of patient's allergies indicates:   Allergen Reactions    No known drug allergies        CURRENT PROBLEM LIST:  Problem List[1]      CURRENT MEDICATION LIST:  Current Medications[2]      Past medical, surgical, family and social histories have been reviewed today.      OBJECTIVE:     Vitals:    05/15/25 0808   BP: 108/64   Pulse: 62   Resp: 18   Temp: 97.6 °F (36.4 °C)   TempSrc: Tympanic   SpO2: 99%   Weight: 64.3 kg (141 lb 12.1 oz)   Height: 5' 5" (1.651 m)     Vitals:    05/15/25 0808   PainSc: 0-No pain         Physical Exam  Vitals reviewed.   Constitutional:       General: She is not in acute distress.  HENT:      Head: Normocephalic and atraumatic.   Cardiovascular:      Rate and Rhythm: Normal rate and regular rhythm.   Pulmonary:      Effort: Pulmonary effort is normal.      Breath sounds: Normal breath sounds.   Musculoskeletal:         General: No swelling, tenderness or deformity. Normal range of motion.   Skin:     Capillary Refill: Capillary refill takes less than 2 seconds.   Neurological:      Mental Status: She is alert and oriented to person, place, and time.      Motor: No weakness.      Gait: Gait normal.   Psychiatric:         Mood and Affect: Mood normal.         Behavior: Behavior normal.         Thought Content: Thought content normal.         Judgment: Judgment normal.         Lab Results   Component Value Date    WBC 5.29 04/29/2025    HGB 10.2 (L) 04/29/2025    HCT 33.8 (L) 04/29/2025    MCV 95 04/29/2025     04/29/2025       Lab Results   Component Value Date "     04/29/2025    K 4.0 04/29/2025     04/29/2025    CO2 28 04/29/2025    BUN 25 (H) 04/29/2025    CREATININE 1.1 04/29/2025    CALCIUM 9.2 04/29/2025    ANIONGAP 7 (L) 04/29/2025    EGFRNORACEVR 52 (L) 04/29/2025         ASSESSMENT:     1. Hospital discharge follow-up    2. [place]-Walker grade 3 cystocele ----- s/p surgical repair, per Gyn//Dr. Natanael Morgan  Overview:  05/08/2025 Anterior Colporrhaphy     3. Status post anterior colporrhaphy ---- see # 2    4. Essential hypertension ---- chronic/stable on HTN med as ordered.  Per PCP.    5. CKD stage 3a, GFR 45-59 ml/min ----- chronic/stable, on ACE as ordered.  Per PCP.    6. Anemia, unspecified type ----- chronic/stable issue, appears stable for at least the past 2 years when lab reviewed today.  Discussed supplementation, iron-rich diet, Vitamin-C.    7. Autoimmune hepatitis ----- chronic/stable, followed by Hepatology --- on prednisone and Imuran as ordered.    8. Immunosuppressed status ----- see # 7      PLAN:     Schedule post-op check with Dr. Natanael Morgan ~ 6/9/25.  RTC as directed and/or prn.            RUMA Still  [facility]            [1]   Patient Active Problem List  Diagnosis    Autoimmune hepatitis    Anemia    Essential hypertension    Hyperlipidemia    Osteopenia    Vitamin D deficiency disease    Osteoarthritis of right knee    Anxiety associated with depression    Immunosuppressed status    Chronic kidney disease, stage 3a    [place]-Walker grade 3 cystocele   [2]   Current Outpatient Medications:     azaTHIOprine (IMURAN) 50 mg Tab, Take 1 tablet (50 mg total) by mouth once daily. Take 1/2 pill a day, Disp: , Rfl:     calcium carb/vit D3/minerals (CALCIUM-VITAMIN D ORAL), Take 1 tablet by mouth once daily. Calcium 600 and vitamin d 800IU/day, Disp: , Rfl:     cholecalciferol, vitamin D3, 125 mcg (5,000 unit) Tab, Take 1,000 Units by mouth once daily., Disp: , Rfl:      clotrimazole-betamethasone 1-0.05% (LOTRISONE) cream, Apply topically 2 (two) times daily., Disp: 45 g, Rfl: 1    estradioL (ESTRACE) 0.01 % (0.1 mg/gram) vaginal cream, Place 1 g vaginally every other day., Disp: 42.5 g, Rfl: 2    HYDROcodone-acetaminophen (NORCO) 5-325 mg per tablet, Take 1 tablet by mouth every 6 (six) hours as needed for Pain., Disp: 15 tablet, Rfl: 0    lisinopriL-hydrochlorothiazide (PRINZIDE,ZESTORETIC) 10-12.5 mg per tablet, TAKE 1 TABLET BY MOUTH EVERY DAY, Disp: 90 tablet, Rfl: 2    mupirocin (BACTROBAN) 2 % ointment, Apply topically 3 (three) times daily., Disp: 1 each, Rfl: 0    predniSONE (DELTASONE) 2.5 MG tablet, TAKE 1 TABLET BY MOUTH EVERY DAY, Disp: 90 tablet, Rfl: 3

## 2025-05-15 ENCOUNTER — OFFICE VISIT (OUTPATIENT)
Dept: FAMILY MEDICINE | Facility: CLINIC | Age: 75
End: 2025-05-15
Payer: MEDICARE

## 2025-05-15 VITALS
RESPIRATION RATE: 18 BRPM | TEMPERATURE: 98 F | OXYGEN SATURATION: 99 % | SYSTOLIC BLOOD PRESSURE: 108 MMHG | WEIGHT: 141.75 LBS | HEART RATE: 62 BPM | HEIGHT: 65 IN | DIASTOLIC BLOOD PRESSURE: 64 MMHG | BODY MASS INDEX: 23.62 KG/M2

## 2025-05-15 DIAGNOSIS — K75.4 AUTOIMMUNE HEPATITIS: Chronic | ICD-10-CM

## 2025-05-15 DIAGNOSIS — Z98.890 STATUS POST ANTERIOR COLPORRHAPHY: ICD-10-CM

## 2025-05-15 DIAGNOSIS — N81.10 BADEN-WALKER GRADE 3 CYSTOCELE: ICD-10-CM

## 2025-05-15 DIAGNOSIS — D64.9 ANEMIA, UNSPECIFIED TYPE: ICD-10-CM

## 2025-05-15 DIAGNOSIS — N18.31 CKD STAGE 3A, GFR 45-59 ML/MIN: ICD-10-CM

## 2025-05-15 DIAGNOSIS — D84.9 IMMUNOSUPPRESSED STATUS: ICD-10-CM

## 2025-05-15 DIAGNOSIS — I10 ESSENTIAL HYPERTENSION: Chronic | ICD-10-CM

## 2025-05-15 DIAGNOSIS — Z09 HOSPITAL DISCHARGE FOLLOW-UP: Primary | ICD-10-CM

## 2025-05-15 PROCEDURE — 99213 OFFICE O/P EST LOW 20 MIN: CPT | Mod: PBBFAC,PO | Performed by: REGISTERED NURSE

## 2025-05-15 PROCEDURE — 99999 PR PBB SHADOW E&M-EST. PATIENT-LVL III: CPT | Mod: PBBFAC,,, | Performed by: REGISTERED NURSE

## 2025-05-16 NOTE — ANESTHESIA POSTPROCEDURE EVALUATION
Anesthesia Post Evaluation    Patient: Julianna Montemayor    Procedure(s) Performed: Procedure(s) (LRB):  COLPORRHAPHY, ANTERIOR (N/A)    Final Anesthesia Type: general      Patient location during evaluation: PACU  Patient participation: Yes- Able to Participate  Level of consciousness: awake and alert, oriented and awake  Post-procedure vital signs: reviewed and stable  Pain management: adequate  Airway patency: patent    PONV status at discharge: No PONV  Anesthetic complications: no      Cardiovascular status: blood pressure returned to baseline  Respiratory status: unassisted  Hydration status: euvolemic  Follow-up not needed.              Vitals Value Taken Time   /64 05/15/25 08:08   Temp 36.4 °C (97.6 °F) 05/15/25 08:08   Pulse 62 05/15/25 08:08   Resp 18 05/15/25 08:08   SpO2 99 % 05/15/25 08:08         Event Time   Out of Recovery 09:06:49         Pain/Lalito Score: No data recorded

## 2025-06-04 ENCOUNTER — TELEPHONE (OUTPATIENT)
Dept: GASTROENTEROLOGY | Facility: CLINIC | Age: 75
End: 2025-06-04
Payer: MEDICARE

## 2025-06-04 ENCOUNTER — OFFICE VISIT (OUTPATIENT)
Dept: OBSTETRICS AND GYNECOLOGY | Facility: CLINIC | Age: 75
End: 2025-06-04
Payer: MEDICARE

## 2025-06-04 VITALS
HEIGHT: 65 IN | SYSTOLIC BLOOD PRESSURE: 112 MMHG | WEIGHT: 142.19 LBS | BODY MASS INDEX: 23.69 KG/M2 | DIASTOLIC BLOOD PRESSURE: 78 MMHG

## 2025-06-04 DIAGNOSIS — K75.4 AUTOIMMUNE HEPATITIS: Primary | Chronic | ICD-10-CM

## 2025-06-04 DIAGNOSIS — Z87.898 NO POST-OP COMPLICATIONS: Primary | ICD-10-CM

## 2025-06-04 PROCEDURE — 99213 OFFICE O/P EST LOW 20 MIN: CPT | Mod: PBBFAC | Performed by: OBSTETRICS & GYNECOLOGY

## 2025-06-04 PROCEDURE — 99999 PR PBB SHADOW E&M-EST. PATIENT-LVL III: CPT | Mod: PBBFAC,,, | Performed by: OBSTETRICS & GYNECOLOGY

## 2025-06-04 PROCEDURE — 99024 POSTOP FOLLOW-UP VISIT: CPT | Mod: POP,,, | Performed by: OBSTETRICS & GYNECOLOGY

## 2025-06-06 ENCOUNTER — HOSPITAL ENCOUNTER (OUTPATIENT)
Dept: RADIOLOGY | Facility: HOSPITAL | Age: 75
Discharge: HOME | End: 2025-06-06
Attending: INTERNAL MEDICINE
Payer: MEDICARE

## 2025-06-06 ENCOUNTER — RESULTS FOLLOW-UP (OUTPATIENT)
Dept: GASTROENTEROLOGY | Facility: HOSPITAL | Age: 75
End: 2025-06-06

## 2025-06-06 DIAGNOSIS — K75.4 AUTOIMMUNE HEPATITIS: Chronic | ICD-10-CM

## 2025-06-06 PROCEDURE — 74177 CT ABD & PELVIS W/CONTRAST: CPT | Mod: TC

## 2025-06-06 PROCEDURE — 25500020 PHARM REV CODE 255: Performed by: INTERNAL MEDICINE

## 2025-06-06 PROCEDURE — 74177 CT ABD & PELVIS W/CONTRAST: CPT | Mod: 26,,, | Performed by: RADIOLOGY

## 2025-06-06 RX ADMIN — IOHEXOL 100 ML: 350 INJECTION, SOLUTION INTRAVENOUS at 10:06

## 2025-06-06 NOTE — PROGRESS NOTES
Please let the patient know that CT scan was OK and just showed very tiny liver cysts. We will repeat in 1 year.     Thanks

## 2025-06-12 ENCOUNTER — OFFICE VISIT (OUTPATIENT)
Dept: PODIATRY | Facility: CLINIC | Age: 75
End: 2025-06-12
Payer: MEDICARE

## 2025-06-12 VITALS — HEIGHT: 65 IN | BODY MASS INDEX: 23.69 KG/M2 | WEIGHT: 142.19 LBS

## 2025-06-12 DIAGNOSIS — L85.3 XEROSIS OF SKIN: Primary | ICD-10-CM

## 2025-06-12 PROCEDURE — 99213 OFFICE O/P EST LOW 20 MIN: CPT | Mod: PBBFAC | Performed by: PODIATRIST

## 2025-06-12 PROCEDURE — 99212 OFFICE O/P EST SF 10 MIN: CPT | Mod: S$PBB,,, | Performed by: PODIATRIST

## 2025-06-12 PROCEDURE — 99999 PR PBB SHADOW E&M-EST. PATIENT-LVL III: CPT | Mod: PBBFAC,,, | Performed by: PODIATRIST

## 2025-06-12 NOTE — PROGRESS NOTES
Subjective:     Patient ID: Julianna Montemayor is a 74 y.o. female.    Chief Complaint: Nail Care (4 month nail care, pt rates 0/10, pt is non-diabetic, pt last seen pcp Jet Patiño, NP 5/15/2025)    Julianna is a 74 y.o. female who presents to the clinic complaining of peeling left foot.  Julianna is inquiring about treatment options.    Patient Active Problem List   Diagnosis    Autoimmune hepatitis    Anemia    Essential hypertension    Hyperlipidemia    Osteopenia    Vitamin D deficiency disease    Osteoarthritis of right knee    Anxiety associated with depression    Immunosuppressed status    Chronic kidney disease, stage 3a    [place]-Walker grade 3 cystocele       Medication List with Changes/Refills   Current Medications    AZATHIOPRINE (IMURAN) 50 MG TAB    Take 1 tablet (50 mg total) by mouth once daily. Take 1/2 pill a day    CALCIUM CARB/VIT D3/MINERALS (CALCIUM-VITAMIN D ORAL)    Take 1 tablet by mouth once daily. Calcium 600 and vitamin d 800IU/day    CHOLECALCIFEROL, VITAMIN D3, 125 MCG (5,000 UNIT) TAB    Take 1,000 Units by mouth once daily.    CLOTRIMAZOLE-BETAMETHASONE 1-0.05% (LOTRISONE) CREAM    Apply topically 2 (two) times daily.    ESTRADIOL (ESTRACE) 0.01 % (0.1 MG/GRAM) VAGINAL CREAM    Place 1 g vaginally every other day.    LISINOPRIL-HYDROCHLOROTHIAZIDE (PRINZIDE,ZESTORETIC) 10-12.5 MG PER TABLET    TAKE 1 TABLET BY MOUTH EVERY DAY    MUPIROCIN (BACTROBAN) 2 % OINTMENT    Apply topically 3 (three) times daily.    PREDNISONE (DELTASONE) 2.5 MG TABLET    TAKE 1 TABLET BY MOUTH EVERY DAY       Review of patient's allergies indicates:   Allergen Reactions    Lotrisone [clotrimazole-betamethasone] Rash       Past Surgical History:   Procedure Laterality Date    ANTERIOR VAGINAL REPAIR N/A 5/8/2025    Procedure: COLPORRHAPHY, ANTERIOR;  Surgeon: HIRA Morgna MD;  Location: [facility];  Service: OB/GYN;  Laterality: N/A;    APPENDECTOMY      COLONOSCOPY N/A 8/2/2016    Procedure: COLONOSCOPY;   Surgeon: Bran Reeves III, MD;  Location: [facility] ENDO;  Service: Endoscopy;  Laterality: N/A;    COLONOSCOPY N/A 5/12/2022    Procedure: COLONOSCOPY;  Surgeon: Bran Reeves III, MD;  Location: [facility] ENDO;  Service: Endoscopy;  Laterality: N/A;    INGUINAL HERNIA REPAIR Left 12/2019    JOINT REPLACEMENT Right 10/2013    knee    ORIF FINGER FRACTURE Right     right middle finger    TOTAL ABDOMINAL HYSTERECTOMY         Family History   Problem Relation Name Age of Onset    Colon cancer Mother  89    Hypertension Mother      Dementia Mother      Stroke Mother  70    Cataracts Mother      Hypertension Sister Herminia     Cancer Sister Herminia     Anesthesia problems Sister Herminia         unknown prob. with anesthesia    Kidney failure Sister Kami         ESRD    Diabetes Sister Kami     Hypertension Sister Kami     No Known Problems Father      Heart attack Brother  48    Diabetes Daughter      Lupus Other Niece        Social History     Socioeconomic History    Marital status:    Tobacco Use    Smoking status: Never    Smokeless tobacco: Never   Substance and Sexual Activity    Alcohol use: Not Currently     Comment: once or month a year one glass of wine    Drug use: No    Sexual activity: Not Currently     Partners: Male   Social History Narrative    The patient is  to a smoker who smokes outside the home. 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 She has 2 adult children.      Social Drivers of Health     Financial Resource Strain: Low Risk  (5/8/2025)    Overall Financial Resource Strain (CARDIA)     Difficulty of Paying Living Expenses: Not very hard   Food Insecurity: No Food Insecurity (5/8/2025)    Hunger Vital Sign     Worried About Running Out of Food in the Last Year: Never true     Ran Out of Food in the Last Year: Never true   Transportation Needs: No Transportation Needs (5/8/2025)    PRAPARE - Transportation     Lack of Transportation (Medical):  "No     Lack of Transportation (Non-Medical): No   Physical Activity: Inactive (6/18/2024)    Exercise Vital Sign     Days of Exercise per Week: 0 days     Minutes of Exercise per Session: 0 min   Stress: No Stress Concern Present (5/8/2025)    [place] [employer] - Occupational Stress Questionnaire     Feeling of Stress : Not at all   Housing Stability: Low Risk  (5/8/2025)    Housing Stability Vital Sign     Unable to Pay for Housing in the Last Year: No     Homeless in the Last Year: No       Vitals:    06/12/25 0816   Weight: 64.5 kg (142 lb 3.2 oz)   Height: 5' 5" (1.651 m)   PainSc: 0-No pain     Review of Systems   Constitutional:  Negative for chills and fever.   Cardiovascular: Negative.    Gastrointestinal:  Negative for diarrhea, nausea and vomiting.   Skin:  Positive for rash.   Neurological: Negative.    Endo/Heme/Allergies: Negative.    Psychiatric/Behavioral: Negative.             Objective:      PHYSICAL EXAM: Apperance: Alert and orient in no distress,well developed, and with good attention to grooming and body habits  Lower Extremity Exam  VASCULAR: Dorsalis pedis pulses 2/4 bilateral and Posterior Tibial pulses 2/4 bilateral.   DERMATOLOGICAL: No skin rash, subcutaneous nodules, lesions or ulcers observed. No dry and scaly skin noted plantarly left in moccasin distribution. Webspaces 1,2,3,4 bilateral are clean, dry and without evidence of break in skin integrity.    NEUROLOGICAL: Light touch, sharp-dull, proprioception all present and equal bilaterally.    MUSCULOSKELETAL: Muscle strength is 5/5 for foot inverters, everters, plantarflexors, and dorsiflexors. Muscle tone is normal.           Assessment:       ICD-10-CM ICD-9-CM   1. Xerosis of skin  L85.3 706.8         Plan:   Xerosis of skin        I counseled the patient on her conditions, regarding findings of my examination, my impressions, and usual treatment plan.   Discuss treatment options for tinea pedis.  I explained " that fungus lives in a warm dark moist environment and therefore patient should make every attempt to keep feet clean and dry.  We discussed drying feet thoroughly after shower particularly between the toes.   Patient instructed to spray all shoes with Lysol disinfectant spray and let dry before wearing. Patient instructed to wash all socks in hot water and bleach.  Patient to return in 4 months or sooner if needed.            Roby Greene DPM  [facility]

## 2025-06-19 ENCOUNTER — OFFICE VISIT (OUTPATIENT)
Dept: OPHTHALMOLOGY | Facility: CLINIC | Age: 75
End: 2025-06-19
Payer: MEDICARE

## 2025-06-19 DIAGNOSIS — H52.7 REFRACTION DISORDER: ICD-10-CM

## 2025-06-19 DIAGNOSIS — H04.123 DRY EYE SYNDROME, BILATERAL: ICD-10-CM

## 2025-06-19 DIAGNOSIS — H25.13 NUCLEAR SENILE CATARACT OF BOTH EYES: Primary | ICD-10-CM

## 2025-06-19 PROCEDURE — 99212 OFFICE O/P EST SF 10 MIN: CPT | Mod: PBBFAC | Performed by: OPHTHALMOLOGY

## 2025-06-19 PROCEDURE — 99214 OFFICE O/P EST MOD 30 MIN: CPT | Mod: S$PBB,,, | Performed by: OPHTHALMOLOGY

## 2025-06-19 PROCEDURE — 99999 PR PBB SHADOW E&M-EST. PATIENT-LVL II: CPT | Mod: PBBFAC,,, | Performed by: OPHTHALMOLOGY

## 2025-06-19 NOTE — PROGRESS NOTES
HPI     Annual Exam            Comments: Pt reports for annual exam.           Comments    DNL patient    1. Cataracts OU             Last edited by Funmi Yoo on 6/19/2025 10:06 AM.            Assessment /Plan     For exam results, see Encounter Report.      ICD-10-CM ICD-9-CM    1. Nuclear senile cataract of both eyes  H25.13 366.16 You were found to have an early cataract in your eye(s) today. However the cataract is not affecting your activities of daily living, such as reading and driving. You do not need surgery at this time. We will recheck your cataract at your next visit. You are welcome to call for an earlier appointment if your vision gets worse.         2. Dry eye syndrome, bilateral  H04.123 375.15 Findings and symptoms consistent with mild dry eyes.   Recommend regular use of Artificial Tears. These should be thought of as Ocular Surface Moisturizers similar to skin moisturizers in that regular use is required to achieve maximum benefit.  Specifically, I recommend the following:    Preservative free tears such as iVizia or Refresh Shan 3-4 times a day and 2 drops upon awakening in each eye.  The first dose upon awakening is most important because we do not make tears at night.  Avoid generic products as they contain Benzalkonium Chloride as a preservative. This is a very irritating chemical and can make your eyes worse.    Hydroeye 2 capsules 2 times a day      3. Refraction disorder  H52.7 367.9           Return to clinic 1 year or PRN

## 2025-06-26 ENCOUNTER — OFFICE VISIT (OUTPATIENT)
Dept: FAMILY MEDICINE | Facility: CLINIC | Age: 75
End: 2025-06-26
Payer: MEDICARE

## 2025-06-26 VITALS
RESPIRATION RATE: 18 BRPM | DIASTOLIC BLOOD PRESSURE: 80 MMHG | HEIGHT: 65 IN | WEIGHT: 138.69 LBS | BODY MASS INDEX: 23.11 KG/M2 | TEMPERATURE: 97 F | SYSTOLIC BLOOD PRESSURE: 130 MMHG | HEART RATE: 62 BPM

## 2025-06-26 DIAGNOSIS — M85.80 OSTEOPENIA, UNSPECIFIED LOCATION: ICD-10-CM

## 2025-06-26 DIAGNOSIS — K75.4 AUTOIMMUNE HEPATITIS: Chronic | ICD-10-CM

## 2025-06-26 DIAGNOSIS — N81.10 BADEN-WALKER GRADE 3 CYSTOCELE: ICD-10-CM

## 2025-06-26 DIAGNOSIS — N18.31 CHRONIC KIDNEY DISEASE, STAGE 3A: Chronic | ICD-10-CM

## 2025-06-26 DIAGNOSIS — I10 ESSENTIAL HYPERTENSION: Chronic | ICD-10-CM

## 2025-06-26 DIAGNOSIS — E55.9 VITAMIN D DEFICIENCY DISEASE: ICD-10-CM

## 2025-06-26 DIAGNOSIS — F41.8 ANXIETY ASSOCIATED WITH DEPRESSION: ICD-10-CM

## 2025-06-26 DIAGNOSIS — D84.9 IMMUNOSUPPRESSED STATUS: ICD-10-CM

## 2025-06-26 DIAGNOSIS — Z74.09 OTHER REDUCED MOBILITY: ICD-10-CM

## 2025-06-26 DIAGNOSIS — Z00.00 ENCOUNTER FOR MEDICARE ANNUAL WELLNESS EXAM: Primary | ICD-10-CM

## 2025-06-26 PROCEDURE — 99213 OFFICE O/P EST LOW 20 MIN: CPT | Mod: PBBFAC,PO | Performed by: NURSE PRACTITIONER

## 2025-06-26 PROCEDURE — 99999 PR PBB SHADOW E&M-EST. PATIENT-LVL III: CPT | Mod: PBBFAC,,, | Performed by: NURSE PRACTITIONER

## 2025-06-26 NOTE — PATIENT INSTRUCTIONS
Counseling and Referral of Other Preventative  (Italic type indicates deductible and co-insurance are waived)    Patient Name: Julianna Montemayor  Today's Date: 6/26/2025    Health Maintenance       Date Due Completion Date    Shingles Vaccine (1 of 2) Never done ---    TETANUS VACCINE 04/27/2022 4/27/2012 (Done)    Override on 4/27/2012: Done    RSV Vaccine (Age 60+ and Pregnant patients) (1 - 1-dose 75+ series) Never done ---    COVID-19 Vaccine (3 - Moderna risk series) 06/26/2026 (Originally 1/1/2022) 12/4/2021    DEXA Scan 08/19/2026 8/19/2021    Override on 5/8/2012: Done    Override on 11/6/2009: Done    Lipid Panel 08/26/2029 8/26/2024        No orders of the defined types were placed in this encounter.    The following information is provided to all patients.  This information is to help you find resources for any of the problems found today that may be affecting your health:                  Living healthy guide: www.[facility].[place].[facility]      Understanding Diabetes: www.diabetes.org      Eating healthy: www.cdc.gov/healthyweight      [facility] home safety checklist: www.cdc.gov/steadi/patient.html      Agency on Aging: www.goea.[place].gov      Alcoholics anonymous (AA): www.aa.org      Physical Activity: www.romy.nih.gov/xr0wboe      Tobacco use: www.quitwithusla.org         Counseling and Referral of Other Preventative  (Italic type indicates deductible and co-insurance are waived)    Patient Name: Julianna Montemayor  Today's Date: 6/26/2025    Health Maintenance       Date Due Completion Date    Shingles Vaccine (1 of 2) Never done ---    TETANUS VACCINE 04/27/2022 4/27/2012 (Done)    Override on 4/27/2012: Done    RSV Vaccine (Age 60+ and Pregnant patients) (1 - 1-dose 75+ series) Never done ---    COVID-19 Vaccine (3 - Moderna risk series) 06/26/2026 (Originally 1/1/2022) 12/4/2021    DEXA Scan 08/19/2026 8/19/2021    Override on 5/8/2012: Done    Override on 11/6/2009: Done    Lipid Panel 08/26/2029 8/26/2024         No orders of the defined types were placed in this encounter.    The following information is provided to all patients.  This information is to help you find resources for any of the problems found today that may be affecting your health:                  Living healthy guide: www.[facility].[place].[facility]      Understanding Diabetes: www.diabetes.org      Eating healthy: www.cdc.gov/healthyweight      [facility] home safety checklist: www.cdc.gov/steadi/patient.html      Agency on Aging: www.goea.[place].[facility]      Alcoholics anonymous (AA): www.aa.org      Physical Activity: www.romy.nih.gov/mx4yzfv      Tobacco use: www.quitwithusla.org

## 2025-06-26 NOTE — PROGRESS NOTES
"  Julianna Montemayor presented for a  Medicare AWV and comprehensive Health Risk Assessment today. The following components were reviewed and updated:    Medical history  Family History  Social history  Allergies and Current Medications  Health Risk Assessment  Health Maintenance  Care Team         ** See Completed Assessments for Annual Wellness Visit within the encounter summary.**         The following assessments were completed:  Living Situation  CAGE  Depression Screening  Timed Get Up and Go  Whisper Test  Cognitive Function Screening  Nutrition Screening  ADL Screening  PAQ Screening      Opioid documentation:      Patient does not have a current opioid prescription.        Vitals:    06/26/25 0815   BP: 130/80   Patient Position: Sitting   Pulse: 62   Resp: 18   Temp: 97 °F (36.1 °C)   Weight: 62.9 kg (138 lb 10.7 oz)   Height: 5' 5" (1.651 m)     Body mass index is 23.08 kg/m².  Physical Exam  Vitals and nursing note reviewed.   Constitutional:       Appearance: Normal appearance. She is well-developed.   HENT:      Head: Normocephalic and atraumatic.   Eyes:      Pupils: Pupils are equal, round, and reactive to light.   Neck:      Vascular: No carotid bruit.   Cardiovascular:      Rate and Rhythm: Normal rate and regular rhythm.      Pulses: Normal pulses.      Heart sounds: Normal heart sounds. No murmur heard.     No gallop.   Pulmonary:      Effort: Pulmonary effort is normal.      Breath sounds: Normal breath sounds.   Abdominal:      General: Bowel sounds are normal. There is no distension.      Palpations: Abdomen is soft.      Tenderness: There is no abdominal tenderness.   Musculoskeletal:         General: No tenderness. Normal range of motion.   Skin:     General: Skin is warm and dry.   Neurological:      Mental Status: She is alert.      Motor: No abnormal muscle tone.      Gait: Gait normal.   Psychiatric:         Speech: Speech normal.         Behavior: Behavior normal.         Thought Content: " Thought content normal.         Judgment: Judgment normal.       Current Outpatient Medications   Medication Instructions    azaTHIOprine (IMURAN) 50 mg, Oral, Daily, Take 1/2 pill a day    calcium carb/vit D3/minerals (CALCIUM-VITAMIN D ORAL) 1 tablet, Daily    cholecalciferol (vitamin D3) 1,000 Units, Daily    clotrimazole-betamethasone 1-0.05% (LOTRISONE) cream Topical (Top), 2 times daily    estradioL (ESTRACE) 1 g, Vaginal, Every other day    lisinopriL-hydrochlorothiazide (PRINZIDE,ZESTORETIC) 10-12.5 mg per tablet 1 tablet, Oral    mupirocin (BACTROBAN) 2 % ointment Topical (Top), 3 times daily    predniSONE (DELTASONE) 2.5 mg, Oral             Diagnoses and health risks identified today and associated recommendations/orders:    1. Encounter for Medicare annual wellness exam      2. Autoimmune hepatitis  Chronic and Stable on Imuran.   Fibro scan  7/ 29/ 2024  Continue current treatment plan as previously prescribed with your hept department     3. Immunosuppressed status  Chronic and Stable on [place]. Continue current treatment plan as previously prescribed with your hept depart    4. [place]-Walker grade 3 cystocele  COLPORRHAPHY, ANTERIOR (Vagina)  Procedure  5/ 6. 25 Bright Gilbert MD  Responsible Provider HIRA Morgan MD  Surgeons   general     Stable urination - no pain. Followed by gyn    5. Chronic kidney disease, stage 3a  Chronic and Stable on Zestoretic. Continue current treatment plan as previously prescribed with your PCP    6. Essential hypertension  Chronic and Stable on Zestoric . Continue current treatment plan as previously prescribed with your PCP    7. Osteopenia, unspecified location  Chronic and Stable on vitamin D. Continue current treatment plan as previously prescribed with your PCP    8. Vitamin D deficiency disease  Chronic and Stable on vitamin D. Continue current treatment plan as previously prescribed with your PCP    9. Anxiety associated with depression  Chronic and  Stable -NO meds. Continue current treatment plan as previously prescribed with your PCP  I offered to discuss advanced care planning, including how to pick a person who would make decisions for you if you were unable to make them for yourself, called a health care power of , and what kind of decisions you might make such as use of life sustaining treatments such as ventilators and tube feeding when faced with a life limiting illness recorded on a living will that they will need to know. (How you want to be cared for as you near the end of your natural life)     X Patient will do      Provided Julianna with a 5-10 year written screening schedule and personal prevention plan. Recommendations were developed using the USPSTF age appropriate recommendations. Education, counseling, and referrals were provided as needed. After Visit Summary printed and given to patient which includes a list of additional screenings\tests needed.    No follow-ups on file.  Tianna Hilario NP

## 2025-07-11 DIAGNOSIS — K75.4 AUTOIMMUNE HEPATITIS: ICD-10-CM

## 2025-07-11 NOTE — TELEPHONE ENCOUNTER
Copied from CRM #[id number]. Topic: Medications - Medication Refill  >> Jul 11, 2025  9:24 AM Tia wrote:  Who Called: Pt    What is the request in detail: Requesting call back to discuss New rx, new pharmacy    predniSONE (DELTASONE) 2.5 MG tablet 90 tablet 3 2/7/2024 -   Sig - Route: TAKE 1 TABLET BY MOUTH EVERY DAY - Oral   Sent to pharmacy as: predniSONE (DELTASONE) 2.5 MG tablet     [facility] [address]  [address] [address]  Phone: [phone] Fax: [phone]    Can the clinic reply by [facility]? No    Best Call Back Number: [phone]    Additional Information:    Can you please send pt medication Prednisone to [facility]. Pt  was previously using [facility] but pharmacy closed and [facility]. Please send pt medication she said she is on her last 2 pills. Pharmacy has been added .Please advise.

## 2025-07-12 RX ORDER — PREDNISONE 2.5 MG/1
2.5 TABLET ORAL DAILY
Qty: 90 TABLET | Refills: 3 | Status: SHIPPED | OUTPATIENT
Start: 2025-07-12

## 2025-07-28 ENCOUNTER — TELEPHONE (OUTPATIENT)
Dept: HEPATOLOGY | Facility: CLINIC | Age: 75
End: 2025-07-28
Payer: MEDICARE

## 2025-07-28 NOTE — TELEPHONE ENCOUNTER
Copied from CRM #[id number]. Topic: Appointments - Appointment Confirmation  >> Jul 28, 2025  7:49 AM Meaghan wrote:  07/29 confirmed

## 2025-07-29 ENCOUNTER — PROCEDURE VISIT (OUTPATIENT)
Dept: HEPATOLOGY | Facility: CLINIC | Age: 75
End: 2025-07-29
Payer: MEDICARE

## 2025-07-29 DIAGNOSIS — K75.4 AUTOIMMUNE HEPATITIS: Chronic | ICD-10-CM

## 2025-07-29 PROCEDURE — 91200 LIVER ELASTOGRAPHY: CPT | Mod: PBBFAC | Performed by: INTERNAL MEDICINE

## 2025-07-29 NOTE — PROCEDURES
FibroScan (Vibration Controlled Transient Elastography)    Date/Time: 7/29/2025 9:00 AM    Performed by: Ashutosh Pacheco MD  Authorized by: Avila Branham MD    Diagnosis:  Other    Probe:  M    Universal Protocol: Patient's identity, procedure and site were verified, confirmatory pause was performed.  Discussed procedure including risks and potential complications.  Questions answered.  Patient verbalizes understanding and wishes to proceed with VCTE.     Procedure: After providing explanations of the procedure, patient was placed in the supine position with right arm in maximum abduction to allow optimal exposure of right lateral abdomen.  Patient was briefly assessed, Testing was performed in the mid-axillary location, 50Hz Shear Wave pulses were applied and the resulting Shear Wave and Propagation Speed detected with a 3.5 MHz ultrasonic signal, using the FibroScan probe, Skin to liver capsule distance and liver parenchyma were accessed during the entire examination with the FibroScan probe, Patient was instructed to breathe normally and to abstain from sudden movements during the procedure, allowing for random measurements of liver stiffness. At least 10 Shear Waves were produced, Individual measurements of each Shear Wave were calculated.  Patient tolerated the procedure well with no complications.  Meets discharge criteria as was dismissed.  Rates pain 0 out of 10.  Patient will follow up with ordering provider to review results.    Findings  Median liver stiffness score:  5.2  CAP Reading: dB/m:  207    IQR/med %:  5  Interpretation  Fibrosis interpretation is based on medial liver stiffness - Kilopascal (kPa).    Fibrosis Stage:  F 0-1  Steatosis interpretation is based on controlled attenuation parameter - (dB/m).    Steatosis Grade:  <S1  Comments/Plan:  No significant steatosis and no fibrosis

## 2025-08-06 DIAGNOSIS — K75.4 AUTOIMMUNE HEPATITIS: ICD-10-CM

## 2025-08-06 RX ORDER — AZATHIOPRINE 50 MG/1
50 TABLET ORAL DAILY
Start: 2025-08-06 | End: 2025-08-07 | Stop reason: SDUPTHER

## 2025-08-07 DIAGNOSIS — K75.4 AUTOIMMUNE HEPATITIS: ICD-10-CM

## 2025-08-07 RX ORDER — AZATHIOPRINE 50 MG/1
50 TABLET ORAL DAILY
Start: 2025-08-07

## 2025-08-08 ENCOUNTER — TELEPHONE (OUTPATIENT)
Dept: GASTROENTEROLOGY | Facility: CLINIC | Age: 75
End: 2025-08-08
Payer: MEDICARE

## 2025-08-08 NOTE — PROGRESS NOTES
Please let the patient know:  Dear Mrs. Montemayor,    Im writing to share the results of your recent FibroScan.    Your fibrosis score is 0-1, indicating no to minimal liver scarring, which is an excellent finding.  Your steatosis score is less than S1, meaning little to no fat was detected in your liver.  Overall, these results are very reassuring and do not suggest any significant liver disease at this time.  Please feel free to message me if you have any questions or would like to discuss the results further.    Best regards,  Dr. Branham

## 2025-08-08 NOTE — TELEPHONE ENCOUNTER
Copied from CRM #[id number]. Topic: Medications - Medication Question  >> Aug 8, 2025  9:39 AM Ramya wrote:  Patient called to consult with nurse or staff regarding the medication azaTHIOprine (IMURAN) 50 mg Tab. She states she is at the pharmacy and prescription has not been called in. She is requesting the medication be called out to   [facility] [address]  [address] [address]  Phone: [phone] Fax: [phone]  Patient would like a call back if possible and can be reached at [phone]. Thanks/MR    Contacted pharmacy and called in pt medication to pharmacy. Medication was sent by Dr. Branham but pharmacy wasn't listed. Medication called in.

## 2025-08-28 ENCOUNTER — LAB VISIT (OUTPATIENT)
Dept: LAB | Facility: HOSPITAL | Age: 75
End: 2025-08-28
Attending: FAMILY MEDICINE
Payer: MEDICARE

## 2025-08-28 ENCOUNTER — OFFICE VISIT (OUTPATIENT)
Dept: FAMILY MEDICINE | Facility: CLINIC | Age: 75
End: 2025-08-28
Payer: MEDICARE

## 2025-08-28 VITALS
OXYGEN SATURATION: 99 % | HEART RATE: 64 BPM | RESPIRATION RATE: 16 BRPM | BODY MASS INDEX: 23.2 KG/M2 | TEMPERATURE: 98 F | DIASTOLIC BLOOD PRESSURE: 74 MMHG | SYSTOLIC BLOOD PRESSURE: 110 MMHG | WEIGHT: 139.25 LBS | HEIGHT: 65 IN

## 2025-08-28 DIAGNOSIS — N18.31 CHRONIC KIDNEY DISEASE, STAGE 3A: Chronic | ICD-10-CM

## 2025-08-28 DIAGNOSIS — D64.9 ANEMIA, UNSPECIFIED TYPE: ICD-10-CM

## 2025-08-28 DIAGNOSIS — M85.80 OSTEOPENIA, UNSPECIFIED LOCATION: ICD-10-CM

## 2025-08-28 DIAGNOSIS — I10 ESSENTIAL HYPERTENSION: ICD-10-CM

## 2025-08-28 DIAGNOSIS — E78.5 HYPERLIPIDEMIA, UNSPECIFIED HYPERLIPIDEMIA TYPE: Chronic | ICD-10-CM

## 2025-08-28 DIAGNOSIS — K75.4 AUTOIMMUNE HEPATITIS: Chronic | ICD-10-CM

## 2025-08-28 DIAGNOSIS — I10 ESSENTIAL HYPERTENSION: Primary | Chronic | ICD-10-CM

## 2025-08-28 LAB
ABSOLUTE EOSINOPHIL (OHS): 0.1 K/UL
ABSOLUTE MONOCYTE (OHS): 0.52 K/UL (ref 0.3–1)
ABSOLUTE NEUTROPHIL COUNT (OHS): 2.92 K/UL (ref 1.8–7.7)
ANION GAP (OHS): 9 MMOL/L (ref 8–16)
BASOPHILS # BLD AUTO: 0.03 K/UL
BASOPHILS NFR BLD AUTO: 0.6 %
BUN SERPL-MCNC: 21 MG/DL (ref 8–23)
CALCIUM SERPL-MCNC: 9.6 MG/DL (ref 8.7–10.5)
CHLORIDE SERPL-SCNC: 104 MMOL/L (ref 95–110)
CHOLEST SERPL-MCNC: 240 MG/DL (ref 120–199)
CHOLEST/HDLC SERPL: 3.2 {RATIO} (ref 2–5)
CO2 SERPL-SCNC: 25 MMOL/L (ref 23–29)
CREAT SERPL-MCNC: 1.1 MG/DL (ref 0.5–1.4)
ERYTHROCYTE [DISTWIDTH] IN BLOOD BY AUTOMATED COUNT: 13.6 % (ref 11.5–14.5)
GFR SERPLBLD CREATININE-BSD FMLA CKD-EPI: 52 ML/MIN/1.73/M2
GLUCOSE SERPL-MCNC: 78 MG/DL (ref 70–110)
HCT VFR BLD AUTO: 36.1 % (ref 37–48.5)
HDLC SERPL-MCNC: 75 MG/DL (ref 40–75)
HDLC SERPL: 31.3 % (ref 20–50)
HGB BLD-MCNC: 11 GM/DL (ref 12–16)
IMM GRANULOCYTES # BLD AUTO: 0.03 K/UL (ref 0–0.04)
IMM GRANULOCYTES NFR BLD AUTO: 0.6 % (ref 0–0.5)
LDLC SERPL CALC-MCNC: 152 MG/DL (ref 63–159)
LYMPHOCYTES # BLD AUTO: 1.82 K/UL (ref 1–4.8)
MCH RBC QN AUTO: 28.4 PG (ref 27–31)
MCHC RBC AUTO-ENTMCNC: 30.5 G/DL (ref 32–36)
MCV RBC AUTO: 93 FL (ref 82–98)
NONHDLC SERPL-MCNC: 165 MG/DL
NUCLEATED RBC (/100WBC) (OHS): 0 /100 WBC
PLATELET # BLD AUTO: 208 K/UL (ref 150–450)
PMV BLD AUTO: 10.2 FL (ref 9.2–12.9)
POTASSIUM SERPL-SCNC: 3.7 MMOL/L (ref 3.5–5.1)
RBC # BLD AUTO: 3.88 M/UL (ref 4–5.4)
RELATIVE EOSINOPHIL (OHS): 1.8 %
RELATIVE LYMPHOCYTE (OHS): 33.6 % (ref 18–48)
RELATIVE MONOCYTE (OHS): 9.6 % (ref 4–15)
RELATIVE NEUTROPHIL (OHS): 53.8 % (ref 38–73)
SODIUM SERPL-SCNC: 138 MMOL/L (ref 136–145)
TRIGL SERPL-MCNC: 65 MG/DL (ref 30–150)
TSH SERPL-ACNC: 0.44 UIU/ML (ref 0.4–4)
WBC # BLD AUTO: 5.42 K/UL (ref 3.9–12.7)

## 2025-08-28 PROCEDURE — 82465 ASSAY BLD/SERUM CHOLESTEROL: CPT

## 2025-08-28 PROCEDURE — G2211 COMPLEX E/M VISIT ADD ON: HCPCS | Mod: ,,, | Performed by: FAMILY MEDICINE

## 2025-08-28 PROCEDURE — 80048 BASIC METABOLIC PNL TOTAL CA: CPT

## 2025-08-28 PROCEDURE — 84443 ASSAY THYROID STIM HORMONE: CPT

## 2025-08-28 PROCEDURE — 99214 OFFICE O/P EST MOD 30 MIN: CPT | Mod: S$PBB,,, | Performed by: FAMILY MEDICINE

## 2025-08-28 PROCEDURE — 36415 COLL VENOUS BLD VENIPUNCTURE: CPT | Mod: PO

## 2025-08-28 PROCEDURE — 85025 COMPLETE CBC W/AUTO DIFF WBC: CPT

## 2025-08-28 PROCEDURE — 99999 PR PBB SHADOW E&M-EST. PATIENT-LVL III: CPT | Mod: PBBFAC,,, | Performed by: FAMILY MEDICINE

## 2025-08-28 PROCEDURE — 99213 OFFICE O/P EST LOW 20 MIN: CPT | Mod: PBBFAC,PO | Performed by: FAMILY MEDICINE

## (undated) DEVICE — ADHESIVE DERMABOND ADVANCED

## (undated) DEVICE — DRAPE LAP TIBURON 77X122IN

## (undated) DEVICE — CONTAINER SPECIMEN OR STER 4OZ

## (undated) DEVICE — PENCIL ELECTROCAUTERY W/ HLSTR

## (undated) DEVICE — MANIFOLD 4 PORT

## (undated) DEVICE — TUBING MEDI-VAC 20FT .25IN

## (undated) DEVICE — SEE MEDLINE ITEM 157117

## (undated) DEVICE — SUT VICRYL 0 27 CT-2

## (undated) DEVICE — SEE MEDLINE ITEM 157027

## (undated) DEVICE — ELECTRODE REM PLYHSV RETURN 9

## (undated) DEVICE — SYR 10CC LUER LOCK

## (undated) DEVICE — PACK BASIC SETUP SC BR

## (undated) DEVICE — SUT VICRYL PLUS 0 CT1 36IN

## (undated) DEVICE — COVER LIGHT HANDLE 80/CA

## (undated) DEVICE — EVACUATOR PENCIL SMOKE NEPTUNE

## (undated) DEVICE — SUT VICRYL CTD 2-0 GI 27 SH

## (undated) DEVICE — SUT VICRYL PLUS 2-0 CT1 18

## (undated) DEVICE — DRAIN PENRS STERILE LTX 18X1/2

## (undated) DEVICE — GLOVE SURGICAL LATEX SZ 7

## (undated) DEVICE — TRAY CATH 1-LYR URIMTR 16FR

## (undated) DEVICE — GAUZE SPONGE PEANUT STRL

## (undated) DEVICE — NDL SAFETY 25G X 1.5 ECLIPSE

## (undated) DEVICE — SUT VICRYL 2-0 CT-2 VCP269H

## (undated) DEVICE — APPLICATOR CHLORAPREP ORN 26ML

## (undated) DEVICE — TRAY SKIN SCRUB WET 4 COMPART

## (undated) DEVICE — SUT VICRYL PLUS 0 CT1 18IN

## (undated) DEVICE — PACK SURG LITHOTOMY 3 SIRUS

## (undated) DEVICE — UNDERGLOVES BIOGEL PI SZ 7 LF

## (undated) DEVICE — SOL 9P NACL IRR PIC IL

## (undated) DEVICE — COVER OVERHEAD SURG LT BLUE

## (undated) DEVICE — SUT VICRYL 3-0 27 SH

## (undated) DEVICE — SUT MONOCRYL 4.0 PS2 CP496G

## (undated) DEVICE — TOWEL OR DISP STRL BLUE 4/PK

## (undated) DEVICE — DRAPE UINDERBUT GRAD PCH

## (undated) DEVICE — SPONGE GAUZE 16PLY 4X4